# Patient Record
Sex: FEMALE | Race: BLACK OR AFRICAN AMERICAN | NOT HISPANIC OR LATINO | Employment: FULL TIME | URBAN - METROPOLITAN AREA
[De-identification: names, ages, dates, MRNs, and addresses within clinical notes are randomized per-mention and may not be internally consistent; named-entity substitution may affect disease eponyms.]

---

## 2017-10-04 ENCOUNTER — HOSPITAL ENCOUNTER (EMERGENCY)
Facility: HOSPITAL | Age: 47
Discharge: HOME | End: 2017-10-04
Payer: COMMERCIAL

## 2017-10-04 VITALS
BODY MASS INDEX: 22.22 KG/M2 | TEMPERATURE: 98 F | HEIGHT: 69 IN | RESPIRATION RATE: 18 BRPM | DIASTOLIC BLOOD PRESSURE: 89 MMHG | SYSTOLIC BLOOD PRESSURE: 135 MMHG | OXYGEN SATURATION: 100 % | HEART RATE: 72 BPM | WEIGHT: 150 LBS

## 2017-10-04 DIAGNOSIS — S60.221A CONTUSION OF RIGHT HAND, INITIAL ENCOUNTER: Primary | ICD-10-CM

## 2017-10-04 PROCEDURE — 99283 EMERGENCY DEPT VISIT LOW MDM: CPT

## 2017-10-04 PROCEDURE — 25000003 PHARM REV CODE 250: Performed by: PHYSICIAN ASSISTANT

## 2017-10-04 RX ORDER — METHOCARBAMOL 500 MG/1
1000 TABLET, FILM COATED ORAL 3 TIMES DAILY
Qty: 30 TABLET | Refills: 0 | Status: SHIPPED | OUTPATIENT
Start: 2017-10-04 | End: 2017-10-09

## 2017-10-04 RX ORDER — DICLOFENAC SODIUM 75 MG/1
75 TABLET, DELAYED RELEASE ORAL 2 TIMES DAILY
Qty: 20 TABLET | Refills: 0 | Status: SHIPPED | OUTPATIENT
Start: 2017-10-04 | End: 2022-07-18

## 2017-10-04 RX ORDER — CYCLOBENZAPRINE HCL 10 MG
10 TABLET ORAL
Status: COMPLETED | OUTPATIENT
Start: 2017-10-04 | End: 2017-10-04

## 2017-10-04 RX ADMIN — CYCLOBENZAPRINE HYDROCHLORIDE 10 MG: 10 TABLET, FILM COATED ORAL at 10:10
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
## 2021-06-08 ENCOUNTER — HOSPITAL ENCOUNTER (EMERGENCY)
Facility: HOSPITAL | Age: 51
Discharge: HOME | End: 2021-06-08
Attending: EMERGENCY MEDICINE
Payer: COMMERCIAL

## 2021-06-08 VITALS
DIASTOLIC BLOOD PRESSURE: 77 MMHG | HEART RATE: 90 BPM | HEIGHT: 69 IN | SYSTOLIC BLOOD PRESSURE: 132 MMHG | OXYGEN SATURATION: 98 % | RESPIRATION RATE: 20 BRPM | WEIGHT: 211.06 LBS | TEMPERATURE: 98 F | BODY MASS INDEX: 31.26 KG/M2

## 2021-06-08 DIAGNOSIS — S09.90XA INJURY OF HEAD, INITIAL ENCOUNTER: ICD-10-CM

## 2021-06-08 DIAGNOSIS — M54.50 ACUTE BILATERAL LOW BACK PAIN WITHOUT SCIATICA: ICD-10-CM
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
## 2021-06-08 DIAGNOSIS — M54.2 NECK PAIN: ICD-10-CM

## 2021-06-08 DIAGNOSIS — S06.0X0A CONCUSSION WITHOUT LOSS OF CONSCIOUSNESS, INITIAL ENCOUNTER: ICD-10-CM

## 2021-06-08 PROCEDURE — 99284 EMERGENCY DEPT VISIT MOD MDM: CPT | Mod: 25

## 2021-06-08 PROCEDURE — 25000003 PHARM REV CODE 250: Performed by: NURSE PRACTITIONER

## 2021-06-08 RX ORDER — ASPIRIN 81 MG/1
81 TABLET ORAL
COMMUNITY
Start: 2021-03-25 | End: 2023-08-17

## 2021-06-08 RX ORDER — TIZANIDINE 4 MG/1
4 TABLET ORAL EVERY 8 HOURS PRN
Qty: 20 TABLET | Refills: 0 | Status: SHIPPED | OUTPATIENT
Start: 2021-06-08 | End: 2021-06-18

## 2021-06-08 RX ORDER — NAPROXEN 500 MG/1
TABLET ORAL
COMMUNITY
Start: 2021-04-07 | End: 2022-07-18

## 2021-06-08 RX ORDER — DICLOFENAC SODIUM 50 MG/1
50 TABLET, DELAYED RELEASE ORAL 3 TIMES DAILY PRN
Qty: 15 TABLET | Refills: 0 | Status: SHIPPED | OUTPATIENT
Start: 2021-06-08 | End: 2022-07-18

## 2021-06-08 RX ORDER — BUTALBITAL, ACETAMINOPHEN AND CAFFEINE 50; 325; 40 MG/1; MG/1; MG/1
1 TABLET ORAL EVERY 6 HOURS PRN
Qty: 20 TABLET | Refills: 0 | Status: SHIPPED | OUTPATIENT
Start: 2021-06-08 | End: 2021-07-08

## 2021-06-08 RX ORDER — BUTALBITAL, ACETAMINOPHEN AND CAFFEINE 50; 325; 40 MG/1; MG/1; MG/1
1 TABLET ORAL
Status: COMPLETED | OUTPATIENT
Start: 2021-06-08 | End: 2021-06-08

## 2021-06-08 RX ORDER — MONTELUKAST SODIUM 10 MG/1
10 TABLET ORAL DAILY
Status: ON HOLD | COMMUNITY
Start: 2021-06-02 | End: 2023-06-11 | Stop reason: HOSPADM

## 2021-06-08 RX ORDER — DEXBROMPHENIRAMINE MALEATE, PHENYLEPHRINE HYDROCHLORIDE 2; 7.5 MG/1; MG/1
TABLET ORAL
COMMUNITY
Start: 2021-06-02 | End: 2022-07-27

## 2021-06-08 RX ORDER — PREDNISONE 20 MG/1
60 TABLET ORAL DAILY
COMMUNITY
Start: 2021-06-02 | End: 2022-07-18

## 2021-06-08 RX ORDER — ONDANSETRON 4 MG/1
4 TABLET, ORALLY DISINTEGRATING ORAL EVERY 8 HOURS PRN
Qty: 15 TABLET | Refills: 0 | Status: SHIPPED | OUTPATIENT
Start: 2021-06-08 | End: 2022-08-09

## 2021-06-08 RX ORDER — ONDANSETRON 8 MG/1
8 TABLET, ORALLY DISINTEGRATING ORAL
Status: COMPLETED | OUTPATIENT
Start: 2021-06-08 | End: 2021-06-08

## 2021-06-08 RX ADMIN — BUTALBITAL, ACETAMINOPHEN, AND CAFFEINE 1 TABLET: 50; 325; 40 TABLET ORAL at 08:06

## 2021-06-08 RX ADMIN — ONDANSETRON 8 MG: 8 TABLET, ORALLY DISINTEGRATING ORAL at 08:06

## 2021-06-15 ENCOUNTER — TELEPHONE (OUTPATIENT)
Dept: NEUROLOGY | Facility: CLINIC | Age: 51
End: 2021-06-15

## 2021-07-26 ENCOUNTER — OFFICE VISIT (OUTPATIENT)
Dept: NEUROLOGY | Facility: CLINIC | Age: 51
End: 2021-07-26
Payer: MEDICAID

## 2021-07-26 VITALS
WEIGHT: 212.94 LBS | SYSTOLIC BLOOD PRESSURE: 120 MMHG | HEIGHT: 69 IN | HEART RATE: 79 BPM | DIASTOLIC BLOOD PRESSURE: 80 MMHG | BODY MASS INDEX: 31.54 KG/M2

## 2021-07-26 DIAGNOSIS — S06.0X0S CONCUSSION WITHOUT LOSS OF CONSCIOUSNESS, SEQUELA: Primary | ICD-10-CM

## 2021-07-26 PROCEDURE — 99999 PR PBB SHADOW E&M-EST. PATIENT-LVL IV: ICD-10-PCS | Mod: PBBFAC,,, | Performed by: PSYCHIATRY & NEUROLOGY

## 2021-07-26 PROCEDURE — 99999 PR PBB SHADOW E&M-EST. PATIENT-LVL IV: CPT | Mod: PBBFAC,,, | Performed by: PSYCHIATRY & NEUROLOGY

## 2021-07-26 PROCEDURE — 99214 OFFICE O/P EST MOD 30 MIN: CPT | Mod: PBBFAC | Performed by: PSYCHIATRY & NEUROLOGY

## 2021-07-26 PROCEDURE — 99205 PR OFFICE/OUTPT VISIT, NEW, LEVL V, 60-74 MIN: ICD-10-PCS | Mod: S$PBB,,, | Performed by: PSYCHIATRY & NEUROLOGY

## 2021-07-26 PROCEDURE — 99205 OFFICE O/P NEW HI 60 MIN: CPT | Mod: S$PBB,,, | Performed by: PSYCHIATRY & NEUROLOGY

## 2021-07-26 RX ORDER — TIZANIDINE HYDROCHLORIDE 4 MG/1
4 CAPSULE, GELATIN COATED ORAL 3 TIMES DAILY PRN
COMMUNITY
Start: 2021-06-17 | End: 2022-06-17

## 2021-07-26 RX ORDER — GABAPENTIN 300 MG/1
CAPSULE ORAL
Qty: 270 CAPSULE | Refills: 1 | Status: SHIPPED | OUTPATIENT
Start: 2021-07-26 | End: 2022-01-24 | Stop reason: SDUPTHER

## 2021-07-26 RX ORDER — MECLIZINE HYDROCHLORIDE 25 MG/1
TABLET ORAL
Status: ON HOLD | COMMUNITY
Start: 2021-06-09 | End: 2023-06-11 | Stop reason: HOSPADM

## 2021-08-02 ENCOUNTER — CLINICAL SUPPORT (OUTPATIENT)
Dept: REHABILITATION | Facility: HOSPITAL | Age: 51
End: 2021-08-02
Payer: MEDICAID

## 2021-08-02 DIAGNOSIS — S06.0X0S CONCUSSION WITHOUT LOSS OF CONSCIOUSNESS, SEQUELA: ICD-10-CM

## 2021-08-02 PROCEDURE — 97162 PT EVAL MOD COMPLEX 30 MIN: CPT

## 2021-08-02 PROCEDURE — 97112 NEUROMUSCULAR REEDUCATION: CPT

## 2021-08-12 ENCOUNTER — CLINICAL SUPPORT (OUTPATIENT)
Dept: REHABILITATION | Facility: HOSPITAL | Age: 51
End: 2021-08-12
Payer: MEDICAID

## 2021-08-12 DIAGNOSIS — S06.0X0S CONCUSSION WITHOUT LOSS OF CONSCIOUSNESS, SEQUELA: Primary | ICD-10-CM

## 2021-08-12 PROCEDURE — 97112 NEUROMUSCULAR REEDUCATION: CPT

## 2021-08-19 ENCOUNTER — CLINICAL SUPPORT (OUTPATIENT)
Dept: REHABILITATION | Facility: HOSPITAL | Age: 51
End: 2021-08-19
Payer: MEDICAID

## 2021-08-19 DIAGNOSIS — S06.0X0S CONCUSSION WITHOUT LOSS OF CONSCIOUSNESS, SEQUELA: Primary | ICD-10-CM

## 2021-08-19 PROCEDURE — 97112 NEUROMUSCULAR REEDUCATION: CPT

## 2021-08-24 ENCOUNTER — CLINICAL SUPPORT (OUTPATIENT)
Dept: REHABILITATION | Facility: HOSPITAL | Age: 51
End: 2021-08-24
Payer: MEDICAID

## 2021-08-24 DIAGNOSIS — S06.0X0S CONCUSSION WITHOUT LOSS OF CONSCIOUSNESS, SEQUELA: Primary | ICD-10-CM

## 2021-08-24 PROCEDURE — 97112 NEUROMUSCULAR REEDUCATION: CPT

## 2021-08-26 ENCOUNTER — CLINICAL SUPPORT (OUTPATIENT)
Dept: REHABILITATION | Facility: HOSPITAL | Age: 51
End: 2021-08-26
Payer: MEDICAID

## 2021-08-26 DIAGNOSIS — S06.0X0S CONCUSSION WITHOUT LOSS OF CONSCIOUSNESS, SEQUELA: Primary | ICD-10-CM

## 2021-08-26 PROCEDURE — 97112 NEUROMUSCULAR REEDUCATION: CPT

## 2021-09-03 ENCOUNTER — PATIENT MESSAGE (OUTPATIENT)
Dept: NEUROLOGY | Facility: CLINIC | Age: 51
End: 2021-09-03

## 2021-09-07 ENCOUNTER — TELEPHONE (OUTPATIENT)
Dept: NEUROLOGY | Facility: CLINIC | Age: 51
End: 2021-09-07
Payer: MEDICAID

## 2021-09-14 ENCOUNTER — CLINICAL SUPPORT (OUTPATIENT)
Dept: REHABILITATION | Facility: HOSPITAL | Age: 51
End: 2021-09-14
Payer: MEDICAID

## 2021-09-14 DIAGNOSIS — S06.0X0S CONCUSSION WITHOUT LOSS OF CONSCIOUSNESS, SEQUELA: Primary | ICD-10-CM

## 2021-09-14 PROCEDURE — 97140 MANUAL THERAPY 1/> REGIONS: CPT

## 2021-09-14 PROCEDURE — 97112 NEUROMUSCULAR REEDUCATION: CPT

## 2021-09-21 ENCOUNTER — CLINICAL SUPPORT (OUTPATIENT)
Dept: REHABILITATION | Facility: HOSPITAL | Age: 51
End: 2021-09-21
Payer: MEDICAID

## 2021-09-21 DIAGNOSIS — S06.0X0S CONCUSSION WITHOUT LOSS OF CONSCIOUSNESS, SEQUELA: Primary | ICD-10-CM

## 2021-09-21 PROCEDURE — 97112 NEUROMUSCULAR REEDUCATION: CPT

## 2021-11-10 ENCOUNTER — TELEPHONE (OUTPATIENT)
Dept: NEUROLOGY | Facility: CLINIC | Age: 51
End: 2021-11-10
Payer: MEDICAID

## 2021-11-16 ENCOUNTER — TELEPHONE (OUTPATIENT)
Dept: NEUROLOGY | Facility: CLINIC | Age: 51
End: 2021-11-16
Payer: MEDICAID

## 2022-01-24 DIAGNOSIS — S06.0X0S CONCUSSION WITHOUT LOSS OF CONSCIOUSNESS, SEQUELA: ICD-10-CM

## 2022-01-24 RX ORDER — GABAPENTIN 300 MG/1
CAPSULE ORAL
Qty: 270 CAPSULE | Refills: 1 | Status: SHIPPED | OUTPATIENT
Start: 2022-01-24 | End: 2022-07-18 | Stop reason: SDUPTHER

## 2022-03-07 ENCOUNTER — TELEPHONE (OUTPATIENT)
Dept: NEUROLOGY | Facility: CLINIC | Age: 52
End: 2022-03-07
Payer: COMMERCIAL

## 2022-03-07 NOTE — TELEPHONE ENCOUNTER
----- Message from Nohemi Lilly sent at 3/7/2022  7:05 AM CST -----  Name Of Caller: Kaylan     Provider Name: Prabhu Wilkins    Does patient feel the need to be seen today? No     Relationship to the Pt?: pt     Contact Preference?: [phone]    What is the nature of the call?:Pt called and would like a call back for appt with you she said any day at 12:15pm  appt I tried to schedule for appt no opening came for me for virtual

## 2022-03-07 NOTE — TELEPHONE ENCOUNTER
----- Message from Oliverio Garcia sent at 3/7/2022  4:45 PM CST -----  Contact: Patient  Patient missed call. Requesting call back      Patient @[phone] (Grant)

## 2022-03-08 ENCOUNTER — PATIENT MESSAGE (OUTPATIENT)
Dept: NEUROLOGY | Facility: CLINIC | Age: 52
End: 2022-03-08
Payer: COMMERCIAL

## 2022-03-09 ENCOUNTER — OFFICE VISIT (OUTPATIENT)
Dept: NEUROLOGY | Facility: CLINIC | Age: 52
End: 2022-03-09
Payer: MEDICAID

## 2022-03-09 DIAGNOSIS — M54.40 CHRONIC MIDLINE LOW BACK PAIN WITH SCIATICA, SCIATICA LATERALITY UNSPECIFIED: ICD-10-CM

## 2022-03-09 DIAGNOSIS — S06.0X0D CONCUSSION WITHOUT LOSS OF CONSCIOUSNESS, SUBSEQUENT ENCOUNTER: ICD-10-CM

## 2022-03-09 DIAGNOSIS — G89.29 CHRONIC MIDLINE LOW BACK PAIN WITH SCIATICA, SCIATICA LATERALITY UNSPECIFIED: ICD-10-CM

## 2022-03-09 PROBLEM — S06.0X0A CONCUSSION WITHOUT LOSS OF CONSCIOUSNESS: Status: ACTIVE | Noted: 2022-03-09

## 2022-03-09 PROCEDURE — 1159F PR MEDICATION LIST DOCUMENTED IN MEDICAL RECORD: ICD-10-PCS | Mod: CPTII,95,, | Performed by: PSYCHIATRY & NEUROLOGY

## 2022-03-09 PROCEDURE — 99214 PR OFFICE/OUTPT VISIT, EST, LEVL IV, 30-39 MIN: ICD-10-PCS | Mod: 95,,, | Performed by: PSYCHIATRY & NEUROLOGY

## 2022-03-09 PROCEDURE — 1160F RVW MEDS BY RX/DR IN RCRD: CPT | Mod: CPTII,95,, | Performed by: PSYCHIATRY & NEUROLOGY

## 2022-03-09 PROCEDURE — 99214 OFFICE O/P EST MOD 30 MIN: CPT | Mod: 95,,, | Performed by: PSYCHIATRY & NEUROLOGY

## 2022-03-09 PROCEDURE — 1159F MED LIST DOCD IN RCRD: CPT | Mod: CPTII,95,, | Performed by: PSYCHIATRY & NEUROLOGY

## 2022-03-09 PROCEDURE — 1160F PR REVIEW ALL MEDS BY PRESCRIBER/CLIN PHARMACIST DOCUMENTED: ICD-10-PCS | Mod: CPTII,95,, | Performed by: PSYCHIATRY & NEUROLOGY

## 2022-03-09 RX ORDER — AMITRIPTYLINE HYDROCHLORIDE 25 MG/1
25 TABLET, FILM COATED ORAL NIGHTLY
Qty: 90 TABLET | Refills: 3 | Status: ON HOLD | OUTPATIENT
Start: 2022-03-09 | End: 2023-06-11 | Stop reason: HOSPADM
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
## 2022-03-25 ENCOUNTER — HOSPITAL ENCOUNTER (OUTPATIENT)
Dept: RADIOLOGY | Facility: HOSPITAL | Age: 52
Discharge: HOME | End: 2022-03-25
Attending: PSYCHIATRY & NEUROLOGY
Payer: COMMERCIAL

## 2022-03-25 DIAGNOSIS — G89.29 CHRONIC MIDLINE LOW BACK PAIN WITH SCIATICA, SCIATICA LATERALITY UNSPECIFIED: ICD-10-CM

## 2022-03-25 DIAGNOSIS — M54.40 CHRONIC MIDLINE LOW BACK PAIN WITH SCIATICA, SCIATICA LATERALITY UNSPECIFIED: ICD-10-CM

## 2022-03-25 DIAGNOSIS — S06.0X0D CONCUSSION WITHOUT LOSS OF CONSCIOUSNESS, SUBSEQUENT ENCOUNTER: ICD-10-CM

## 2022-03-25 PROCEDURE — 72148 MRI LUMBAR SPINE W/O DYE: CPT | Mod: TC

## 2022-03-25 PROCEDURE — 72148 MRI LUMBAR SPINE W/O DYE: CPT | Mod: 26,,, | Performed by: RADIOLOGY

## 2022-03-25 PROCEDURE — 72148 MRI LUMBAR SPINE WITHOUT CONTRAST: ICD-10-PCS | Mod: 26,,, | Performed by: RADIOLOGY

## 2022-04-01 ENCOUNTER — PATIENT MESSAGE (OUTPATIENT)
Dept: NEUROLOGY | Facility: CLINIC | Age: 52
End: 2022-04-01
Payer: COMMERCIAL

## 2022-07-13 ENCOUNTER — HOSPITAL ENCOUNTER (EMERGENCY)
Facility: HOSPITAL | Age: 52
Discharge: HOME | End: 2022-07-13
Attending: EMERGENCY MEDICINE
Payer: COMMERCIAL

## 2022-07-13 VITALS
OXYGEN SATURATION: 98 % | HEIGHT: 69 IN | SYSTOLIC BLOOD PRESSURE: 130 MMHG | RESPIRATION RATE: 16 BRPM | TEMPERATURE: 98 F | DIASTOLIC BLOOD PRESSURE: 64 MMHG | BODY MASS INDEX: 31.45 KG/M2 | HEART RATE: 60 BPM

## 2022-07-13 DIAGNOSIS — M54.9 ACUTE BILATERAL BACK PAIN, UNSPECIFIED BACK LOCATION: ICD-10-CM

## 2022-07-13 DIAGNOSIS — G44.319 ACUTE POST-TRAUMATIC HEADACHE, NOT INTRACTABLE: Primary | ICD-10-CM
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
## 2022-07-13 PROCEDURE — 96372 THER/PROPH/DIAG INJ SC/IM: CPT | Performed by: EMERGENCY MEDICINE

## 2022-07-13 PROCEDURE — 25000003 PHARM REV CODE 250: Performed by: EMERGENCY MEDICINE

## 2022-07-13 PROCEDURE — 99285 EMERGENCY DEPT VISIT HI MDM: CPT | Mod: 25

## 2022-07-13 PROCEDURE — 63600175 PHARM REV CODE 636 W HCPCS: Performed by: EMERGENCY MEDICINE

## 2022-07-13 RX ORDER — CYCLOBENZAPRINE HCL 10 MG
5 TABLET ORAL 3 TIMES DAILY PRN
Qty: 15 TABLET | Refills: 0 | Status: SHIPPED | OUTPATIENT
Start: 2022-07-13 | End: 2022-07-18

## 2022-07-13 RX ORDER — MORPHINE SULFATE 4 MG/ML
4 INJECTION, SOLUTION INTRAMUSCULAR; INTRAVENOUS
Status: COMPLETED | OUTPATIENT
Start: 2022-07-13 | End: 2022-07-13

## 2022-07-13 RX ORDER — HYDROCODONE BITARTRATE AND ACETAMINOPHEN 5; 325 MG/1; MG/1
1 TABLET ORAL EVERY 6 HOURS PRN
Qty: 9 TABLET | Refills: 0 | Status: SHIPPED | OUTPATIENT
Start: 2022-07-13 | End: 2022-07-27

## 2022-07-13 RX ORDER — ONDANSETRON 4 MG/1
4 TABLET, ORALLY DISINTEGRATING ORAL
Status: COMPLETED | OUTPATIENT
Start: 2022-07-13 | End: 2022-07-13

## 2022-07-13 RX ADMIN — MORPHINE SULFATE 4 MG: 4 INJECTION INTRAVENOUS at 12:07

## 2022-07-13 RX ADMIN — ONDANSETRON 4 MG: 4 TABLET, ORALLY DISINTEGRATING ORAL at 12:07

## 2022-07-13 NOTE — DISCHARGE INSTRUCTIONS
Ibuprofen for pain, Flexeril as a muscle relaxer Norco for breakthrough pain.  Take it easy the next few days.  Return as needed for any worsening symptoms, problems, questions or concerns.

## 2022-07-13 NOTE — Clinical Note
"Kaylan"Shannon Lal was seen and treated in our emergency department on 7/13/2022.  She may return to work on 07/16/2022.       If you have any questions or concerns, please don't hesitate to call.      Farhat Calderon Jr., MD"

## 2022-07-13 NOTE — ED PROVIDER NOTES
SCRIBE #1 NOTE: I, Brittaney Aguilar, am scribing for, and in the presence of, Farhat Calderon Jr., MD. I have scribed the entire note.      History      Chief Complaint   Patient presents with    Pain     Head and back pain d/t fall from chair breaking this am; c-collar in place       Review of patient's allergies indicates:   Allergen Reactions    Codeine         HPI   HPI    7/13/2022, 11:28 AM   History obtained from the patient      History of Present Illness: Kaylan Lal is a 52 y.o. female patient who presents to the Emergency Department for an evaluation after she fell PTA. The pt reports that she went to sit in a chair and it collapsed causing her to fall onto her buttocks and hit the back of her head and back. Pt denies LOC or taking blood thinners. Pt c/o a headache, back pain, and neck pain. Symptoms are constant and moderate in severity. No mitigating or exacerbating factors reported. Patient denies any syncope, LOC, fever, chills, SOB, CP, weakness, numbness, and all other sxs at this time. No prior Tx reported. Pt denies the possibility of being pregnant as she has had a partial hysterectomy. No further complaints or concerns at this time.  Patient notes significant headache after head trauma.        Arrival mode: EMS    PCP: Jyothi Edwards MD       Past Medical History:  No past medical history on file.    Past Surgical History:  Past Surgical History:   Procedure Laterality Date    HYSTERECTOMY      Partial         Family History:  No family history on file.    Social History:  Social History     Tobacco Use    Smoking status: Never Smoker    Smokeless tobacco: Never Used   Substance and Sexual Activity    Alcohol use: Not Currently    Drug use: Never    Sexual activity: Yes       ROS   Review of Systems   Constitutional: Negative for chills and fever.   HENT: Negative for sore throat.    Respiratory: Negative for shortness of breath.    Cardiovascular: Negative for chest pain.  "  Gastrointestinal: Negative for nausea.   Genitourinary: Negative for dysuria.   Musculoskeletal: Positive for back pain and neck pain.   Skin: Negative for rash.   Neurological: Positive for headaches. Negative for syncope, weakness and numbness.        (-) LOC   Hematological: Does not bruise/bleed easily.   All other systems reviewed and are negative.    Physical Exam      Initial Vitals [07/13/22 1104]   BP Pulse Resp Temp SpO2   (!) 159/88 69 18 97.9 °F (36.6 °C) 98 %      MAP       --          Physical Exam  GEN:  Alert and oriented to person place and time.  No acute distress.  HEENT:  Normocephalic atraumatic. Extraocular muscles intact bilaterally. No evidence of entrapment.  No nasal deformity.  Nasal septum is midline.  There is no septal hematoma.  Tympanic membranes are normal. No hemotympanum.  Negative Gates sign. No CSF leak  CV:.  Regular rate and rhythm without gallops murmurs or rubs. 2+ pulses bilateral upper and lower extremities.  PULM:  Clear to auscultation bilaterally. No respiratory distress    Gi:  Soft nontender nondistended with normoactive bowel sounds  :.  No CVA tenderness. No suprapubic tenderness.  MS:  There is no point C or S tenderness.  There is some midline tenderness to the lower thoracic/upper lumbar region.  No step-off or crepitus.  Normal curvature.  Patient does note some mild pain with rightward axial rotation of the neck.  C-collar has been maintained.  All other bones been palpated joints range fully without tenderness or deformity.    NEURO:  II-XII intact bilaterally. No focal lateralizing signs.  GCS of 15.   SKIN:  Intact. No rash or laceration.      ED Course    Procedures  ED Vital Signs:  Vitals:    07/13/22 1104 07/13/22 1205 07/13/22 1235 07/13/22 1315   BP: (!) 159/88 (!) 161/81 134/69 130/64   Pulse: 69 61 (!) 58 60   Resp: 18 18 16 16   Temp: 97.9 °F (36.6 °C)   98 °F (36.7 °C)   TempSrc: Oral   Oral   SpO2: 98% 98% 98% 98%   Height: 5' 9" (1.753 m)    "       Abnormal Lab Results:  Labs Reviewed - No data to display       Imaging Results:  Imaging Results          X-Ray Thoracic Spine AP And Lateral (Final result)  Result time 07/13/22 12:51:13    Final result by Kian Gamez MD (07/13/22 12:51:13)                 Impression:      1.  Negative for acute process of the thoracic spine.  Mild degenerative changes.    2.  Left retrocardiac scarring or atelectasis.  Early infectious process difficult to exclude in the right clinical setting.  Clinical correlation is advised.    3.  Incidental findings as noted above.      Electronically signed by: Kian Gamez MD  Date:    07/13/2022  Time:    12:51             Narrative:    EXAMINATION:  XR THORACIC SPINE AP LATERAL    CLINICAL HISTORY:  Unspecified fall, initial encounter    TECHNIQUE:  AP and lateral views of the thoracic spine were performed.    COMPARISON:  None    FINDINGS:  Intervertebral disc heights and vertebral body heights are well maintained.  Mild multilevel marginal spondylosis.  Negative for fracture or subluxation.    Tortuous aorta.  Cardiac silhouette size is normal.  Left retrocardiac scarring or atelectasis.  Lungs are otherwise clear.                               X-Ray Lumbar Spine Ap And Lateral (Final result)  Result time 07/13/22 12:44:05    Final result by Kian Gamez MD (07/13/22 12:44:05)                 Impression:      1.  Negative for acute process involving the lumbar spine.    2.  Minor marginal spondylosis.      Electronically signed by: Kian Gamez MD  Date:    07/13/2022  Time:    12:44             Narrative:    EXAMINATION:  XR LUMBAR SPINE AP AND LATERAL    CLINICAL HISTORY:  fall;    COMPARISON:  June 8, 2021    FINDINGS:  There are 5 weight bearing lumbar vertebra.  Minor multilevel marginal spondylosis.  The vertebral body heights and intervertebral disc heights are   well-maintained. Negative for spondylolysis or spondylolisthesis. The sacral ala and sacroiliac joints  are intact. The bowel gas pattern is normal.                               CT Cervical Spine Without Contrast (Final result)  Result time 07/13/22 12:13:18    Final result by Kian Gamez MD (07/13/22 12:13:18)                 Impression:      1.  Negative CT scan of the cervical spine. No evidence of fracture or subluxation.    2.  Similar degree of mild degenerative changes involving the cervical spine mainly manifesting is anterior annular calcifications and anterior marginal spondylosis.    3.  8 mm low-attenuation focus in the left thyroid lobe noted.    All CT scans at this facility are performed  using dose modulation techniques as appropriate to performed exam including the following:  automated exposure control; adjustment of mA and/or kV according to the patients size (this includes techniques or standardized protocols for targeted exams where dose is matched to indication/reason for exam: i.e. extremities or head);  iterative reconstruction technique.      Electronically signed by: Kian Gamez MD  Date:    07/13/2022  Time:    12:13             Narrative:    EXAMINATION:  CT CERVICAL SPINE WITHOUT CONTRAST    CLINICAL HISTORY:  Neck trauma, dangerous injury mechanism (Age 16-64y);Neck trauma, impaired ROM (Age 16-64y); ground level fall    TECHNIQUE:  Axial noncontrast CT scan of the cervical spine with sagittal and coronal reconstructions.    COMPARISON:  June 8, 2021    FINDINGS:  The cervical vertebrae reveal normal alignment, shape and bone density. The cervical vertebra are intact without evidence of fracture or dislocation.    Multilevel anterior annular calcifications and anterior marginal spondylosis noted.  The disc heights are well maintained.  The thecal sac is patent.  Nerve root foramen are patent.    The surrounding neck soft tissues are normal.  The lung apices are clear.    8 mm low-attenuation focus in the right thyroid gland.  The thyroid gland is otherwise normal.                                CT Head Without Contrast (Final result)  Result time 07/13/22 12:00:40    Final result by Kian Gamez MD (07/13/22 12:00:40)                 Impression:      1.  Negative for acute intracranial process. Negative for hemorrhage, or skull fracture.    2.  Mild paranasal sinus disease in distribution described above.    All CT scans at this facility are performed  using dose modulation techniques as appropriate to performed exam including the following:  automated exposure control; adjustment of mA and/or kV according to the patients size (this includes techniques or standardized protocols for targeted exams where dose is matched to indication/reason for exam: i.e. extremities or head);  iterative reconstruction technique.      Electronically signed by: Kian Gamez MD  Date:    07/13/2022  Time:    12:00             Narrative:    EXAMINATION:  CT HEAD WITHOUT CONTRAST    CLINICAL HISTORY:  Head trauma, moderate-severe; ground level fall    TECHNIQUE:  Axial images through the brain and posterior fossa were obtained without the use of IV contrast.  Sagittal and coronal reconstructions are provided for review.    COMPARISON:  June 8, 2021    FINDINGS:  The ventricles are midline and the CSF spaces are normal. The gray-white matter junction is well preserved. Negative for intracranial vascular abnormalities. Negative for mass, mass effect, cerebral edema, hemorrhage or abnormal fluid collections.  Stable falx and arachnoid granulation calcifications.    The skull and scalp are  intact.  Mild right maxillary sinus and ethmoid air cell disease.  The rest of the paranasal sinuses, mastoid air cells, middle ears and ear canals are clear. The globes are intact.                                        The Emergency Provider reviewed the vital signs and test results, which are outlined above.    ED Discussion     12:55 PM: Reassessed pt at this time. Discussed with pt all pertinent ED information and results.  Discussed pt dx and plan of tx. Gave pt all f/u and return to the ED instructions. All questions and concerns were addressed at this time. Pt expresses understanding of information and instructions, and is comfortable with plan to discharge. Pt is stable for discharge.    I discussed with patient and/or family/caretaker that evaluation in the ED does not suggest any emergent or life threatening medical conditions requiring immediate intervention beyond what was provided in the ED, and I believe patient is safe for discharge.  Regardless, an unremarkable evaluation in the ED does not preclude the development or presence of a serious of life threatening condition. As such, patient was instructed to return immediately for any worsening or change in current symptoms.           ED Medication(s):  Medications   morphine injection 4 mg (4 mg Intramuscular Given 7/13/22 1205)   ondansetron disintegrating tablet 4 mg (4 mg Oral Given 7/13/22 1205)        Follow-up Information     Jyothi Edwards MD.    Specialty: Internal Medicine  Contact information:  [address] [place] [id number] [phone]                         Discharge Medication List as of 7/13/2022 12:58 PM      START taking these medications    Details   cyclobenzaprine (FLEXERIL) 10 MG tablet Take 0.5 tablets (5 mg total) by mouth 3 (three) times daily as needed for Muscle spasms., Starting Wed 7/13/2022, Print      HYDROcodone-acetaminophen (NORCO) 5-325 mg per tablet Take 1 tablet by mouth every 6 (six) hours as needed for Pain., Starting Wed 7/13/2022, Print               Medical Decision Making    Medical Decision Making:   Clinical Tests:   Radiological Study: Ordered and Reviewed           Scribe Attestation:   Scribe #1: I performed the above scribed service and the documentation accurately describes the services I performed. I attest to the accuracy of the note.    Attending:   Physician Attestation Statement for Scribe #1: Farhat VENTURA  Kvng Pizarro MD, personally performed the services described in this documentation, as scribed by Brittaney Aguilar, in my presence, and it is both accurate and complete.          Clinical Impression       ICD-10-CM ICD-9-CM   1. Acute post-traumatic headache, not intractable  G44.319 339.21   2. Fall  W19.XXXA E888.9   3. Acute bilateral back pain, unspecified back location  M54.9 724.5       Disposition:   Disposition: Discharged  Condition: Stable         Farhat Calderon Jr., MD  07/13/22 3018

## 2022-07-27 ENCOUNTER — TELEPHONE (OUTPATIENT)
Dept: PAIN MEDICINE | Facility: CLINIC | Age: 52
End: 2022-07-27
Payer: COMMERCIAL

## 2022-08-16 ENCOUNTER — TELEPHONE (OUTPATIENT)
Dept: PAIN MEDICINE | Facility: CLINIC | Age: 52
End: 2022-08-16
Payer: COMMERCIAL

## 2022-08-16 NOTE — PROGRESS NOTES
New Patient Chronic Pain Note (Initial Visit)    Referring Physician: Jyothi Edwards MD    PCP: Jyothi Edwards MD    Chief Complaint:   Chief Complaint   Patient presents with    Back Pain        SUBJECTIVE:    Kaylan Lal is a 52 y.o. female with past medical history significant for  who presents to the clinic for the evaluation of diffuse myalgias and lower back pain.  Patient reports pain flare began July 13th when she fell backwards out of a chair with concussive injury.  Patient denies loss of consciousness.  Today patient reports pain which is constant which is rated a 5/10.  Pain is described as aching, sharp, knotting up  in patient reports pain along cervical paraspinous musculature, medial risk, medial knees, lower back.  Pain is exacerbated with prolonged sitting as well as with ambulation.  Patient reports she is able to ambulate approximately 10-15 minutes before requiring rest.  Pain is improved with laying in the left lateral decubitus position and with standing with assistive device, cane.  Patient has been performing physical therapy but finds it difficult to attend sessions internally secondary to work schedule.  Patient denies significant upper or lower extremity weakness, bowel or bladder incontinence or saddle anesthesia.     Patient denies night fever/night sweats, urinary incontinence, bowel incontinence, significant weight loss, significant motor weakness and loss of sensations.    Pain Disability Index Review:     Last 3 PDI Scores 8/17/2022   Pain Disability Index (PDI) 53       Non-Pharmacologic Treatments:  Physical Therapy/Home Exercise: yes  Ice/Heat:yes  TENS: no  Acupuncture: no  Massage: no  Chiropractic: no    Other: no      Pain Medications:  - Adjuvant Medications: Alleve (Naproxen), Elavil (Amitriptyline) and Neurontin (Gabapentin)  - Anti-Coagulants: Aspirin    Pain Procedures:   None    History reviewed. No pertinent past medical history.  Past Surgical History:    Procedure Laterality Date    HYSTERECTOMY      Partial     Review of patient's allergies indicates:   Allergen Reactions    Codeine      Other reaction(s): Unknown    Hydrocodone-acetaminophen     Penicillins        Current Outpatient Medications   Medication Sig    meclizine (ANTIVERT) 25 mg tablet One tab po q 6hours prn dizziness    amitriptyline (ELAVIL) 25 MG tablet Take 1 tablet (25 mg total) by mouth every evening. (Patient not taking: No sig reported)    aspirin (ECOTRIN) 81 MG EC tablet Take 81 mg by mouth.    azelastine (ASTELIN) 137 mcg (0.1 %) nasal spray SMARTSIG:Both Nares    cyclobenzaprine (FLEXERIL) 5 MG tablet Take 5 mg by mouth 3 (three) times daily as needed.    dexbrompheniramine-phenyleph (ALAHIST PE) 2-7.5 mg Tab 1 tablet    gabapentin (NEURONTIN) 300 MG capsule Take up to 3 tabs daily as needed for back, neck, and headache pain (Patient not taking: No sig reported)    LIDOcaine (LIDODERM) 5 %     montelukast (SINGULAIR) 10 mg tablet Take 10 mg by mouth once daily.    naproxen (NAPROSYN) 500 MG tablet Take 1 tablet (500 mg total) by mouth 2 (two) times daily with meals. (Patient not taking: No sig reported)    pregabalin (LYRICA) 75 MG capsule Take 1 capsule, 75 mg, once daily for 1 week.  Followed by take 1 capsule 75 mg twice daily for 1 week.  Followed by 2 capsules, 150 mg in the morning and 75 mg at night for 1 week.  Followed by 150 mg twice daily for 1 week.  Followed by 225 mg in the morning and 150 mg in the evening for 1 week.  Followed by 225 mg b.i.d..     No current facility-administered medications for this visit.       Review of Systems     GENERAL:  No weight loss, malaise or fevers.  HEENT:   No recent changes in vision or hearing  NECK:  Negative for lumps, no difficulty with swallowing.  RESPIRATORY:  Negative for cough, wheezing or shortness of breath, patient denies any recent URI.  CARDIOVASCULAR:  Negative for chest pain or palpitations.  GI:  Negative  "for abdominal discomfort, blood in stools or black stools or change in bowel habits.  MUSCULOSKELETAL:  See HPI.  SKIN:  Negative for lesions, rash, and itching.  PSYCH:  No mood disorder or recent psychosocial stressors.   HEMATOLOGY/LYMPHOLOGY:  Negative for prolonged bleeding, bruising easily or swollen nodes.    NEURO:   No history of syncope, paralysis, seizures or tremors.  All other reviewed and negative other than HPI.    OBJECTIVE:    BP (!) 134/97   Pulse 72   Ht 5' 9" (1.753 m)   Wt 95.7 kg (210 lb 15.7 oz)   BMI 31.16 kg/m²       Physical Exam    GENERAL: Well appearing, in no acute distress, alert and oriented x3.  PSYCH:  Mood and affect appropriate.  SKIN: Skin color, texture, turgor normal, no rashes or lesions.  HEAD/FACE:  Normocephalic, atraumatic. Cranial nerves grossly intact.    NECK:  pain to palpation over the cervical paraspinous muscles. Spurling Negative. pain with neck flexion, extension, or lateral flexion.   Normal  strength bilaterally    CV: RRR with palpation of the radial artery.  PULM: No evidence of respiratory difficulty, symmetric chest rise.  GI:  Soft and non-tender.    BACK: Straight leg raising in the sitting and supine positions is negative to radicular pain.  pain to palpation over the facet joints of the lumbar spine or spinous processes. Normal range of motion without pain reproduction.  EXTREMITIES: Peripheral joint ROM is full and pain free without obvious instability or laxity in all four extremities. No deformities, edema, or skin discoloration. Good capillary refill.  MUSCULOSKELETAL: Able to stand on heels & toes.    hip, and knee provocative maneuvers are negative. pain with palpation over the sacroiliac joints bilaterally.  Gaenslen's, Distraction/Compression and  FABERs test is positive.  Facet loading test is positive bilaterally.   Bilateral upper and lower extremity strength is normal and symmetric.  No atrophy or tone abnormalities are " noted.  Tenderness to palpation at 18/18 tender points.  RIGHT Lower extremity: Hip flexion 5/5, Hip Abduction 5/5, Hip Adduction 5/5, Knee extension 5/5, Knee flexion 5/5, Ankle dorsiflexion5/5, Extensor hallucis longus 5/5, Ankle plantarflexion 5/5  LEFT Lower extremity:  Hip flexion 5/5, Hip Abduction 5/5,Hip Adduction 5/5, Knee extension 5/5, Knee flexion 5/5, Ankle dorsiflexion 5/5, Extensor hallucis longus 5/5, Ankle plantarflexion 5/5  -Normal testing knee (patellar) jerk and ankle (achilles) jerk    NEURO: Bilateral upper and lower extremity coordination and muscle stretch reflexes are physiologic and symmetric. No loss of sensation is noted.  GAIT: normal.    Imagin22    MRI Lumbar Spine Without Contrast    FINDINGS:  Alignment: Normal.    Vertebrae: 5 lumbar-type vertebral bodies. No aggressive marrow replacement process or fracture.  Likely hemangioma at the level of the LEFT L5 pedicle.    Discs: Satisfactory height and signal.    Cord: Normal. Conus terminates at .    Degenerative findings:    T12-L1: There is no focal disc herniation. No significant central canal narrowing . No significant neural foraminal narrowing.    L1-L2: There is no focal disc herniation. No significant central canal narrowing . No significant neural foraminal narrowing.    L2-L3: There is no focal disc herniation. No significant central canal narrowing . No significant neural foraminal narrowing.    L3-L4: The Broad based mild diffuse bulging of disc material .  No significant central canal narrowing . Mild bilateral neural foraminal narrowing.    L4-L5: Broad based mild diffuse bulging of disc material .  No significant central canal narrowing . Mild bilateral LEFT greater than RIGHT neural foraminal narrowing.    L5-S1: There is no focal disc herniation. No significant central canal narrowing . No significant neural foraminal narrowing.    Paraspinal muscles & soft tissues: Unremarkable.    Impression  Mild  broad-based bulging of disc material L3-L4 and L4-L5 with mild bilateral neural foraminal encroachment.    07/13/22    CT Cervical Spine Without Contrast      FINDINGS:  The cervical vertebrae reveal normal alignment, shape and bone density. The cervical vertebra are intact without evidence of fracture or dislocation.    Multilevel anterior annular calcifications and anterior marginal spondylosis noted.  The disc heights are well maintained.  The thecal sac is patent.  Nerve root foramen are patent.    The surrounding neck soft tissues are normal.  The lung apices are clear.    8 mm low-attenuation focus in the right thyroid gland.  The thyroid gland is otherwise normal.    Impression  1.  Negative CT scan of the cervical spine. No evidence of fracture or subluxation.    2.  Similar degree of mild degenerative changes involving the cervical spine mainly manifesting is anterior annular calcifications and anterior marginal spondylosis.    3.  8 mm low-attenuation focus in the left thyroid lobe noted.     07/13/22    X-Ray Lumbar Spine Ap And Lateral    FINDINGS:  There are 5 weight bearing lumbar vertebra.  Minor multilevel marginal spondylosis.  The vertebral body heights and intervertebral disc heights are   well-maintained. Negative for spondylolysis or spondylolisthesis. The sacral ala and sacroiliac joints are intact. The bowel gas pattern is normal.    06/08/21    X-Ray Cervical Spine AP And Lateral      FINDINGS:  Mild moderate multilevel degenerative joint disease of the cervical spine.    No evidence of acute fracture or dislocation.  Bony mineralization is normal.  Soft tissues are unremarkable.      ASSESSMENT: 52 y.o. year old female with mid to lower back pain, consistent with     1. Lumbar spondylosis  Ambulatory referral/consult to Physical/Occupational Therapy   2. Acute midline thoracic back pain  Ambulatory referral/consult to Pain Clinic   3. Acute midline low back pain with bilateral sciatica   Ambulatory referral/consult to Pain Clinic   4. Myofascial pain  Ambulatory referral/consult to Physical/Occupational Therapy   5. Fibromyalgia  Ambulatory referral/consult to Physical/Occupational Therapy         PLAN:   - Interventions:  We discussed considering targeted injections, including lumbar medial branch block, cervical medial branch block or trigger point injections.  We have discussed the reduced efficacy of injections when fibromyalgia symptoms are severe.  We have discussed considering these options in the future and starting with pharmacologic management and  physical therapy.  Explained the risks and benefits of the procedure in detail with the patient today in clinic along with alternative treatment options    - Anticoagulation use: yes aspirin     report:  Reviewed and consistent with medication use as prescribed.    - Medications:  -I will start the patient on a Lyrica titration to see if this helps with neuropathic pain.  We discussed increasing the dose gradually to reach a therapeutic goal according to the following algorithm.  We discussed potential side effects of this medication which may include drowsiness,dizziness, dry mouth, constipation or peripheral edema.    -Week 1: Take 1 capsule, 75 mg QD  -Week 2: Take 1 capsule 75 mg BID  -Week 3: Take 2 capsules, 150 mg in the morning and 75 mg QHS  -Week 4: Take 2 capsules, 150 mg BID  -Week 5: Take 3 capsules, 225 mg in the morning and 150 mg (2 capsules) QHS  -Week 6: Take 3 capsules, 225 mgBID      - Therapy:   -Patient demonstrates diffuse myalgias, tenderness to palpation at 18/18 tender points compounded by a constellation of symptoms including insomnia, headaches and cystitis, chronic fatigue, consistent with fibromyalgia.  I discussed with the patient the diagnosis of fibromyalgia. We advised the patient to participate in aerobic exercise for at least 6 days a week for 60 minutes per day to the point of sweating.  We counseled the  patient that this course leads to the best results for patients are suffering with fibromyalgia symptoms.  The patient was receptive to the counseling and agrees to proceed.    -We discussed initiating physical therapy to help manage the patient/s painful condition. The patient was counseled that muscle strengthening will improve the long term prognosis in regards to pain and may also help increase range of motion and mobility. They were told that one of the goals of physical therapy is that they learn how to do the exercises so that they can do them independently at home daily upon completion. The patient's questions were answered and they were agreeable to this course. A referral for external physical therapy: [facility] was provided to the patient to accommodate her work schedule.    - Imaging: Reviewed available imaging with patient and answered any questions they had regarding study    - Follow up visit: return to clinic in 4-6 weeks      The above plan and management options were discussed at length with patient. Patient is in agreement with the above and verbalized understanding.    - I discussed the goals of interventional chronic pain management with the patient on today's visit. We discussed a multimodal and systematic approach to pain.  This includes diagnostic and therapeutic injections, adjuvant pharmacologic treatment, physical therapy, and at times psychiatry.  I emphasized the importance of regular exercise, core strengthening and stretching, diet and weight loss as a cornerstone of long-term pain management.    - This condition does not require this patient to take time off of work, and the primary goal of our Pain Management services is to improve the patient's functional capacity.  - Patient Questions: Answered all of the patient's questions regarding diagnoses, therapy, treatment and next steps        Conrad Schwartz MD  Interventional Pain Management  [facility]    Disclaimer:  This note was  prepared using voice recognition system and is likely to have sound alike errors that may have been overlooked even after proof reading.  Please call me with any questions

## 2022-08-17 ENCOUNTER — OFFICE VISIT (OUTPATIENT)
Dept: PAIN MEDICINE | Facility: CLINIC | Age: 52
End: 2022-08-17
Payer: COMMERCIAL

## 2022-08-17 VITALS
SYSTOLIC BLOOD PRESSURE: 134 MMHG | HEIGHT: 69 IN | HEART RATE: 72 BPM | DIASTOLIC BLOOD PRESSURE: 97 MMHG | BODY MASS INDEX: 31.25 KG/M2 | WEIGHT: 211 LBS

## 2022-08-17 DIAGNOSIS — M54.41 ACUTE MIDLINE LOW BACK PAIN WITH BILATERAL SCIATICA: ICD-10-CM

## 2022-08-17 DIAGNOSIS — M79.7 FIBROMYALGIA: ICD-10-CM

## 2022-08-17 DIAGNOSIS — M54.42 ACUTE MIDLINE LOW BACK PAIN WITH BILATERAL SCIATICA: ICD-10-CM

## 2022-08-17 DIAGNOSIS — M54.6 ACUTE MIDLINE THORACIC BACK PAIN: ICD-10-CM

## 2022-08-17 DIAGNOSIS — M47.816 LUMBAR SPONDYLOSIS: Primary | ICD-10-CM

## 2022-08-17 DIAGNOSIS — M79.18 MYOFASCIAL PAIN: ICD-10-CM

## 2022-08-17 PROCEDURE — 1159F MED LIST DOCD IN RCRD: CPT | Mod: CPTII,S$GLB,, | Performed by: ANESTHESIOLOGY

## 2022-08-17 PROCEDURE — 1160F RVW MEDS BY RX/DR IN RCRD: CPT | Mod: CPTII,S$GLB,, | Performed by: ANESTHESIOLOGY

## 2022-08-17 PROCEDURE — 99204 PR OFFICE/OUTPT VISIT, NEW, LEVL IV, 45-59 MIN: ICD-10-PCS | Mod: S$GLB,,, | Performed by: ANESTHESIOLOGY

## 2022-08-17 PROCEDURE — 1159F PR MEDICATION LIST DOCUMENTED IN MEDICAL RECORD: ICD-10-PCS | Mod: CPTII,S$GLB,, | Performed by: ANESTHESIOLOGY

## 2022-08-17 PROCEDURE — 3008F BODY MASS INDEX DOCD: CPT | Mod: CPTII,S$GLB,, | Performed by: ANESTHESIOLOGY

## 2022-08-17 PROCEDURE — 99999 PR PBB SHADOW E&M-EST. PATIENT-LVL V: ICD-10-PCS | Mod: PBBFAC,,, | Performed by: ANESTHESIOLOGY

## 2022-08-17 PROCEDURE — 3080F DIAST BP >= 90 MM HG: CPT | Mod: CPTII,S$GLB,, | Performed by: ANESTHESIOLOGY

## 2022-08-17 PROCEDURE — 3075F PR MOST RECENT SYSTOLIC BLOOD PRESS GE 130-139MM HG: ICD-10-PCS | Mod: CPTII,S$GLB,, | Performed by: ANESTHESIOLOGY

## 2022-08-17 PROCEDURE — 1160F PR REVIEW ALL MEDS BY PRESCRIBER/CLIN PHARMACIST DOCUMENTED: ICD-10-PCS | Mod: CPTII,S$GLB,, | Performed by: ANESTHESIOLOGY

## 2022-08-17 PROCEDURE — 3080F PR MOST RECENT DIASTOLIC BLOOD PRESSURE >= 90 MM HG: ICD-10-PCS | Mod: CPTII,S$GLB,, | Performed by: ANESTHESIOLOGY

## 2022-08-17 PROCEDURE — 3008F PR BODY MASS INDEX (BMI) DOCUMENTED: ICD-10-PCS | Mod: CPTII,S$GLB,, | Performed by: ANESTHESIOLOGY

## 2022-08-17 PROCEDURE — 99999 PR PBB SHADOW E&M-EST. PATIENT-LVL V: CPT | Mod: PBBFAC,,, | Performed by: ANESTHESIOLOGY

## 2022-08-17 PROCEDURE — 3075F SYST BP GE 130 - 139MM HG: CPT | Mod: CPTII,S$GLB,, | Performed by: ANESTHESIOLOGY

## 2022-08-17 PROCEDURE — 99204 OFFICE O/P NEW MOD 45 MIN: CPT | Mod: S$GLB,,, | Performed by: ANESTHESIOLOGY

## 2022-08-17 RX ORDER — PREGABALIN 75 MG/1
CAPSULE ORAL
Qty: 147 CAPSULE | Refills: 0 | Status: ON HOLD | OUTPATIENT
Start: 2022-08-17 | End: 2023-06-11 | Stop reason: HOSPADM

## 2022-08-17 NOTE — PATIENT INSTRUCTIONS
Exercises: Neck Isometrics  To start, sit in a chair with your feet flat on the floor. Your weight should be slightly forward so that youre balanced evenly on your buttocks. Relax your shoulders and keep your head level. Using a chair with arms may help you keep your balance.       Press your palm against your forehead. Resist with your neck muscles. Hold for 10 seconds. Relax. Repeat 5 times.  Do the exercise again, pressing on the side of your head. Repeat 5 times. Switch sides.  Do the exercise again, pressing on the back of your head. Repeat 5 times.  For your safety, check with your healthcare provider before starting an exercise program.   Date Last Reviewed: 8/16/2015  © 8388-1638 [employer]. [address] [place] [address], [place] [address]. All rights reserved. This information is not intended as a substitute for professional medical care. Always follow your healthcare professional's instructions.       Exercises to Strengthen Your Lower Back  Strong lower back and abdominal muscles work together to support your spine. The exercises below will help strengthen the lower back. It is important that you begin exercising slowly and increase levels gradually.  Always begin any exercise program with stretching. If you feel pain while doing any of these exercises, stop and talk to your doctor about a more specific exercise program that better suits your condition.   Low back stretch  The point of stretching is to make you more flexible and increase your range of motion. Stretch only as much as you are able. Stretch slowly. Do not push your stretch to the limit. If at any point you feel pain while stretching, this is your (temporary) limit.  Lie on your back with your knees bent and both feet on the ground.  Slowly raise your left knee to your chest as you flatten your lower back against the floor. Hold for 5 seconds.  Relax and repeat the exercise with your right knee.  Do 10 of these exercises for each  leg.  Repeat hugging both knees to your chest at the same time.  Building lower back strength  Start your exercise routine with 10 to 30 minutes a day, 1 to 3 times a day.  Initial exercises  Lying on your back:  Ankle pumps: Move your foot up and down, towards your head, and then away. Repeat 10 times with each foot.  Heel slides: Slowly bend your knee, drawing the heel of your foot towards you. Then slide your heel/foot from you, straightening your knee. Do not lift your foot off the floor (this is not a leg lift).  Abdominal contraction: Bend your knees and put your hands on your stomach. Tighten your stomach muscles. Hold for 5 seconds, then relax. Repeat 10 times.  Straight leg raise: Bend one leg at the knee and keep the other leg straight. Tighten your stomach muscles. Slowly lift your straight leg 6 to 12 inches off the floor and hold for up to 5 seconds. Repeat 10 times on each side.  Standing:  Wall squats: Stand with your back against the wall. Move your feet about 12 inches away from the wall. Tighten your stomach muscles, and slowly bend your knees until they are at about a 45 degree angle. Do not go down too far. Hold about 5 seconds. Then slowly return to your starting position. Repeat 10 times.  Heel raises: Stand facing the wall. Slowly raise the heels of your feet up and down, while keeping your toes on the floor. If you have trouble balancing, you can touch the wall with your hands. Repeat 10 times.  More advanced exercises  When you feel comfortable enough, try these exercises.  Kneeling lumbar extension: Begin on your hands and knees. At the same time, raise and straighten your right arm and left leg until they are parallel to the ground. Hold for 2 seconds and come back slowly to a starting position. Repeat with left arm and right leg, alternating 10 times.  Prone lumbar extension: Lie face down, arms extended overhead, palms on the floor. At the same time, raise your right arm and left leg as  high as comfortably possible. Hold for 10 seconds and slowly return to start. Repeat with left arm and right leg, alternating 10 times. Gradually build up to 20 times. (Advanced: Repeat this exercise raising both arms and both legs a few inches off the floor at the same time. Hold for 5 seconds and release.)  Pelvic tilt: Lie on the floor on your back with your knees bent at 90 degrees. Your feet should be flat on the floor. Inhale, exhale, then slowly contract your abdominal muscles bringing your navel toward your spine. Let your pelvis rock back until your lower back is flat on the floor. Hold for 10 seconds while breathing smoothly.  Abdominal crunch: Perform a pelvic tilt (above) flattening your lower back against the floor. Holding the tension in your abdominal muscles, take another breath and raise your shoulder blades off the ground (this is not a full sit-up). Keep your head in line with your body (dont bend your neck forward). Hold for 2 seconds, then slowly lower.  Date Last Reviewed: 6/1/2016 © 2000-2017 [employer]. [address] [place] [address], [place] [address]. All rights reserved. This information is not intended as a substitute for professional medical care. Always follow your healthcare professional's instructions.

## 2022-08-30 ENCOUNTER — PATIENT MESSAGE (OUTPATIENT)
Dept: PAIN MEDICINE | Facility: CLINIC | Age: 52
End: 2022-08-30
Payer: COMMERCIAL

## 2022-09-20 PROBLEM — M47.812 CERVICAL SPINE DEGENERATION: Status: ACTIVE | Noted: 2022-09-20

## 2022-10-03 ENCOUNTER — TELEPHONE (OUTPATIENT)
Dept: PAIN MEDICINE | Facility: CLINIC | Age: 52
End: 2022-10-03
Payer: COMMERCIAL

## 2022-10-03 NOTE — TELEPHONE ENCOUNTER
----- Message from Jania Rider sent at 10/3/2022 10:10 AM CDT -----  Pt would like to know if she need to take off a partial day from work or the entire day for the appt. Call back number is [phone] or [phone]. x EL

## 2022-10-03 NOTE — TELEPHONE ENCOUNTER
Called patient to inform her that she does not need to take off a partial day from work or the entire day for the appt.  Also to turn her appt into a virtual. Patent Answered and did confirmed.     Mich Maria  Medical Assistant

## 2022-10-11 ENCOUNTER — TELEPHONE (OUTPATIENT)
Dept: PAIN MEDICINE | Facility: CLINIC | Age: 52
End: 2022-10-11
Payer: COMMERCIAL

## 2022-10-11 NOTE — PROGRESS NOTES
Established Patient Chronic Pain Note     The patient location is: work  The chief complaint leading to consultation is:  Lower back and right leg pain  Visit type: Virtual visit with synchronous audio and video  Total time spent with patient: 15m  Each patient to whom he or she provides medical services by telemedicine is: (1) informed of the relationship between the physician and patient and the respective role of any other health care provider with respect to management of the patient; and (2) notified that he or she may decline to receive medical services by telemedicine and may withdraw from such care at any time.    Referring Physician: No ref. provider found    PCP: Jyothi Edwards MD    Chief Complaint:   Lower back and right leg pain       SUBJECTIVE:  Interval Hx: 10/12/22  Patient presents for 2 month follow-up.  Today she reports primarily lower back pain which radiates down the lateral and posterior aspect of the right lower extremity in L4-S1 distribution to the foot.  Patient reports improvement in prior diffuse myalgias and reports pain is primarily in the lower back and right leg.  Patient continues to report weakness in the right lower extremity with prolonged standing and with ambulation.  Patient does report not being able to fill Lyrica secondary to insurance coverage and also reports not continuing Cymbalta prescribed by primary care provider secondary to nerves feeling on fire.     HPI 08/17/22  Kaylan Lal is a 52 y.o. female with past medical history significant for  who presents to the clinic for the evaluation of diffuse myalgias and lower back pain.  Patient reports pain flare began July 13th when she fell backwards out of a chair with concussive injury.  Patient denies loss of consciousness.  Today patient reports pain which is constant which is rated a 5/10.  Pain is described as aching, sharp, knotting up  and patient reports pain along cervical paraspinous musculature,  medial risk, medial knees, lower back.  Pain is exacerbated with prolonged sitting as well as with ambulation.  Patient reports she is able to ambulate approximately 10-15 minutes before requiring rest.  Pain is improved with laying in the left lateral decubitus position and with standing with assistive device, cane.  Patient has been performing physical therapy but finds it difficult to attend sessions internally secondary to work schedule.  Patient denies significant upper or lower extremity weakness, bowel or bladder incontinence or saddle anesthesia.     Patient denies night fever/night sweats, urinary incontinence, bowel incontinence, significant weight loss, significant motor weakness and loss of sensations.    Pain Disability Index Review:     Last 3 PDI Scores 8/17/2022   Pain Disability Index (PDI) 53       Non-Pharmacologic Treatments:  Physical Therapy/Home Exercise: yes  Ice/Heat:yes  TENS: no  Acupuncture: no  Massage: no  Chiropractic: no    Other: no      Pain Medications:  - Adjuvant Medications: Alleve (Naproxen), Elavil (Amitriptyline) and Neurontin (Gabapentin)  - Anti-Coagulants: Aspirin    Pain Procedures:   None    History reviewed. No pertinent past medical history.  Past Surgical History:   Procedure Laterality Date    HYSTERECTOMY      Partial     Review of patient's allergies indicates:   Allergen Reactions    Codeine      Other reaction(s): Unknown    Hydrocodone-acetaminophen     Penicillins        Current Outpatient Medications   Medication Sig    amitriptyline (ELAVIL) 25 MG tablet Take 1 tablet (25 mg total) by mouth every evening.    aspirin (ECOTRIN) 81 MG EC tablet Take 81 mg by mouth.    azelastine (ASTELIN) 137 mcg (0.1 %) nasal spray SMARTSIG:Both Nares    cyclobenzaprine (FLEXERIL) 5 MG tablet Take 5 mg by mouth 3 (three) times daily as needed.    dexbrompheniramine-phenyleph (ALAHIST PE) 2-7.5 mg Tab 1 tablet    DULoxetine (CYMBALTA) 30 MG capsule One tab at night for 3days  then increase  to one tab po bid    LIDOcaine (LIDODERM) 5 %     meclizine (ANTIVERT) 25 mg tablet One tab po q 6hours prn dizziness    montelukast (SINGULAIR) 10 mg tablet Take 10 mg by mouth once daily.    naproxen (NAPROSYN) 500 MG tablet Take 1 tablet (500 mg total) by mouth 2 (two) times daily with meals. (Patient not taking: No sig reported)    pregabalin (LYRICA) 75 MG capsule Take 1 capsule, 75 mg, once daily for 1 week.  Followed by take 1 capsule 75 mg twice daily for 1 week.  Followed by 2 capsules, 150 mg in the morning and 75 mg at night for 1 week.  Followed by 150 mg twice daily for 1 week.  Followed by 225 mg in the morning and 150 mg in the evening for 1 week.  Followed by 225 mg b.i.d.. (Patient not taking: Reported on 9/20/2022)     No current facility-administered medications for this visit.       Review of Systems     GENERAL:  No weight loss, malaise or fevers.  HEENT:   No recent changes in vision or hearing  NECK:  Negative for lumps, no difficulty with swallowing.  RESPIRATORY:  Negative for cough, wheezing or shortness of breath, patient denies any recent URI.  CARDIOVASCULAR:  Negative for chest pain or palpitations.  GI:  Negative for abdominal discomfort, blood in stools or black stools or change in bowel habits.  MUSCULOSKELETAL:  See HPI.  SKIN:  Negative for lesions, rash, and itching.  PSYCH:  No mood disorder or recent psychosocial stressors.   HEMATOLOGY/LYMPHOLOGY:  Negative for prolonged bleeding, bruising easily or swollen nodes.    NEURO:   No history of syncope, paralysis, seizures or tremors.  All other reviewed and negative other than HPI.    OBJECTIVE:    There were no vitals taken for this visit.      Physical Exam    GENERAL: Well appearing, in no acute distress, alert and oriented x3.  PSYCH:  Mood and affect appropriate.  SKIN: Skin color, texture, turgor normal, no rashes or lesions.  HEAD/FACE:  Normocephalic, atraumatic. Cranial nerves grossly intact.    NECK:  pain  to palpation over the cervical paraspinous muscles. Spurling Negative. pain with neck flexion, extension, or lateral flexion.   Normal  strength bilaterally    CV: RRR with palpation of the radial artery.  PULM: No evidence of respiratory difficulty, symmetric chest rise.  GI:  Soft and non-tender.    BACK: Straight leg raising in the sitting and supine positions is negative to radicular pain.  pain to palpation over the facet joints of the lumbar spine or spinous processes. Normal range of motion without pain reproduction.  EXTREMITIES: Peripheral joint ROM is full and pain free without obvious instability or laxity in all four extremities. No deformities, edema, or skin discoloration. Good capillary refill.  MUSCULOSKELETAL: Able to stand on heels & toes.    hip, and knee provocative maneuvers are negative. pain with palpation over the sacroiliac joints bilaterally.  Gaenslen's, Distraction/Compression and  FABERs test is positive.  Facet loading test is positive bilaterally.   Bilateral upper and lower extremity strength is normal and symmetric.  No atrophy or tone abnormalities are noted.  Tenderness to palpation at 18/18 tender points.  RIGHT Lower extremity: Hip flexion 5/5, Hip Abduction 5/5, Hip Adduction 5/5, Knee extension 5/5, Knee flexion 5/5, Ankle dorsiflexion5/5, Extensor hallucis longus 5/5, Ankle plantarflexion 5/5  LEFT Lower extremity:  Hip flexion 5/5, Hip Abduction 5/5,Hip Adduction 5/5, Knee extension 5/5, Knee flexion 5/5, Ankle dorsiflexion 5/5, Extensor hallucis longus 5/5, Ankle plantarflexion 5/5  -Normal testing knee (patellar) jerk and ankle (achilles) jerk    NEURO: Bilateral upper and lower extremity coordination and muscle stretch reflexes are physiologic and symmetric. No loss of sensation is noted.  GAIT: normal.    Imagin22    MRI Lumbar Spine Without Contrast    FINDINGS:  Alignment: Normal.    Vertebrae: 5 lumbar-type vertebral bodies. No aggressive marrow  replacement process or fracture.  Likely hemangioma at the level of the LEFT L5 pedicle.    Discs: Satisfactory height and signal.    Cord: Normal. Conus terminates at .    Degenerative findings:    T12-L1: There is no focal disc herniation. No significant central canal narrowing . No significant neural foraminal narrowing.    L1-L2: There is no focal disc herniation. No significant central canal narrowing . No significant neural foraminal narrowing.    L2-L3: There is no focal disc herniation. No significant central canal narrowing . No significant neural foraminal narrowing.    L3-L4: The Broad based mild diffuse bulging of disc material .  No significant central canal narrowing . Mild bilateral neural foraminal narrowing.    L4-L5: Broad based mild diffuse bulging of disc material .  No significant central canal narrowing . Mild bilateral LEFT greater than RIGHT neural foraminal narrowing.    L5-S1: There is no focal disc herniation. No significant central canal narrowing . No significant neural foraminal narrowing.    Paraspinal muscles & soft tissues: Unremarkable.    Impression  Mild broad-based bulging of disc material L3-L4 and L4-L5 with mild bilateral neural foraminal encroachment.    07/13/22    CT Cervical Spine Without Contrast      FINDINGS:  The cervical vertebrae reveal normal alignment, shape and bone density. The cervical vertebra are intact without evidence of fracture or dislocation.    Multilevel anterior annular calcifications and anterior marginal spondylosis noted.  The disc heights are well maintained.  The thecal sac is patent.  Nerve root foramen are patent.    The surrounding neck soft tissues are normal.  The lung apices are clear.    8 mm low-attenuation focus in the right thyroid gland.  The thyroid gland is otherwise normal.    Impression  1.  Negative CT scan of the cervical spine. No evidence of fracture or subluxation.    2.  Similar degree of mild degenerative changes involving  the cervical spine mainly manifesting is anterior annular calcifications and anterior marginal spondylosis.    3.  8 mm low-attenuation focus in the left thyroid lobe noted.     07/13/22    X-Ray Lumbar Spine Ap And Lateral    FINDINGS:  There are 5 weight bearing lumbar vertebra.  Minor multilevel marginal spondylosis.  The vertebral body heights and intervertebral disc heights are   well-maintained. Negative for spondylolysis or spondylolisthesis. The sacral ala and sacroiliac joints are intact. The bowel gas pattern is normal.    06/08/21    X-Ray Cervical Spine AP And Lateral      FINDINGS:  Mild moderate multilevel degenerative joint disease of the cervical spine.    No evidence of acute fracture or dislocation.  Bony mineralization is normal.  Soft tissues are unremarkable.      ASSESSMENT: 52 y.o. year old female with mid to lower back pain, consistent with     1. Lumbar radiculopathy  IR ECHO Lumbar w/ Img    Case Request-RAD/Other Procedure Area: L4-5 interlaminar epidural steroid injection with right paramedian approach with RN IV sedation      2. Myofascial pain        3. Fibromyalgia        4. Lumbar spondylosis                PLAN:   - Interventions:  Schedule for L4-5 interlaminar epidural steroid injection with right paramedian approach to see if this helps with lower back and right-sided radicular pain.  Explained the risks and benefits of the procedure in detail with the patient today in clinic along with alternative treatment options.  Patient has elected to pursue this procedure    - Anticoagulation use: yes aspirin  Per ASA guidelines for primary prophylaxis, patient will need to pause aspirin 5 days prior to her procedure.  Will obtain clearance from PCP, Dr. Edwards.     report:  Reviewed and consistent with medication use as prescribed.    - Medications:  -patient reports side effects with Cymbalta medication.  We will hold off on pharmacologic management at this time and continue  interventional therapy.      - Therapy:   -Patient demonstrates diffuse myalgias, tenderness to palpation at 18/18 tender points compounded by a constellation of symptoms including insomnia, headaches and cystitis, chronic fatigue, consistent with fibromyalgia.  I discussed with the patient the diagnosis of fibromyalgia. We advised the patient to participate in aerobic exercise for at least 6 days a week for 60 minutes per day to the point of sweating.  We counseled the patient that this course leads to the best results for patients are suffering with fibromyalgia symptoms.  The patient was receptive to the counseling and agrees to proceed.    -We discussed initiating physical therapy to help manage the patient/s painful condition. The patient was counseled that muscle strengthening will improve the long term prognosis in regards to pain and may also help increase range of motion and mobility. They were told that one of the goals of physical therapy is that they learn how to do the exercises so that they can do them independently at home daily upon completion. The patient's questions were answered and they were agreeable to this course. A referral for external physical therapy: [facility] was provided to the patient to accommodate her work schedule.    - Imaging: Reviewed available imaging with patient and answered any questions they had regarding study    - Follow up visit: return to clinic in 4-6 weeks post injection      The above plan and management options were discussed at length with patient. Patient is in agreement with the above and verbalized understanding.    - I discussed the goals of interventional chronic pain management with the patient on today's visit. We discussed a multimodal and systematic approach to pain.  This includes diagnostic and therapeutic injections, adjuvant pharmacologic treatment, physical therapy, and at times psychiatry.  I emphasized the importance of regular exercise, core  strengthening and stretching, diet and weight loss as a cornerstone of long-term pain management.    - This condition does not require this patient to take time off of work, and the primary goal of our Pain Management services is to improve the patient's functional capacity.  - Patient Questions: Answered all of the patient's questions regarding diagnoses, therapy, treatment and next steps        Conrad Schwartz MD  Interventional Pain Management  [facility] [place]    Disclaimer:  This note was prepared using voice recognition system and is likely to have sound alike errors that may have been overlooked even after proof reading.  Please call me with any questions

## 2022-10-12 ENCOUNTER — OFFICE VISIT (OUTPATIENT)
Dept: PAIN MEDICINE | Facility: CLINIC | Age: 52
End: 2022-10-12
Payer: COMMERCIAL

## 2022-10-12 DIAGNOSIS — M79.7 FIBROMYALGIA: ICD-10-CM

## 2022-10-12 DIAGNOSIS — M54.16 LUMBAR RADICULOPATHY: Primary | ICD-10-CM

## 2022-10-12 DIAGNOSIS — M47.816 LUMBAR SPONDYLOSIS: ICD-10-CM

## 2022-10-12 DIAGNOSIS — M79.18 MYOFASCIAL PAIN: ICD-10-CM

## 2022-10-12 PROCEDURE — 1160F PR REVIEW ALL MEDS BY PRESCRIBER/CLIN PHARMACIST DOCUMENTED: ICD-10-PCS | Mod: CPTII,95,, | Performed by: ANESTHESIOLOGY

## 2022-10-12 PROCEDURE — 1159F MED LIST DOCD IN RCRD: CPT | Mod: CPTII,95,, | Performed by: ANESTHESIOLOGY

## 2022-10-12 PROCEDURE — 99214 PR OFFICE/OUTPT VISIT, EST, LEVL IV, 30-39 MIN: ICD-10-PCS | Mod: 95,,, | Performed by: ANESTHESIOLOGY

## 2022-10-12 PROCEDURE — 1159F PR MEDICATION LIST DOCUMENTED IN MEDICAL RECORD: ICD-10-PCS | Mod: CPTII,95,, | Performed by: ANESTHESIOLOGY

## 2022-10-12 PROCEDURE — 1160F RVW MEDS BY RX/DR IN RCRD: CPT | Mod: CPTII,95,, | Performed by: ANESTHESIOLOGY

## 2022-10-12 PROCEDURE — 99214 OFFICE O/P EST MOD 30 MIN: CPT | Mod: 95,,, | Performed by: ANESTHESIOLOGY

## 2022-10-17 ENCOUNTER — TELEPHONE (OUTPATIENT)
Dept: PAIN MEDICINE | Facility: CLINIC | Age: 52
End: 2022-10-17
Payer: COMMERCIAL

## 2022-10-20 ENCOUNTER — TELEPHONE (OUTPATIENT)
Dept: PAIN MEDICINE | Facility: CLINIC | Age: 52
End: 2022-10-20
Payer: COMMERCIAL

## 2022-10-21 ENCOUNTER — TELEPHONE (OUTPATIENT)
Dept: PAIN MEDICINE | Facility: CLINIC | Age: 52
End: 2022-10-21
Payer: COMMERCIAL

## 2022-11-01 ENCOUNTER — TELEPHONE (OUTPATIENT)
Dept: PAIN MEDICINE | Facility: CLINIC | Age: 52
End: 2022-11-01
Payer: COMMERCIAL

## 2023-04-11 ENCOUNTER — PATIENT MESSAGE (OUTPATIENT)
Dept: RESEARCH | Facility: HOSPITAL | Age: 53
End: 2023-04-11
Payer: COMMERCIAL

## 2023-06-08 ENCOUNTER — HOSPITAL ENCOUNTER (INPATIENT)
Facility: HOSPITAL | Age: 53
LOS: 2 days | Discharge: HOME | DRG: 391 | End: 2023-06-11
Attending: EMERGENCY MEDICINE | Admitting: INTERNAL MEDICINE
Payer: COMMERCIAL

## 2023-06-08 DIAGNOSIS — R93.5 ABNORMAL ABDOMINAL CT SCAN: ICD-10-CM

## 2023-06-08 DIAGNOSIS — R82.81 PYURIA: ICD-10-CM

## 2023-06-08 DIAGNOSIS — K63.0 ABSCESS OF SIGMOID COLON: ICD-10-CM

## 2023-06-08 DIAGNOSIS — K65.1 INTRA-ABDOMINAL ABSCESS: ICD-10-CM

## 2023-06-08 DIAGNOSIS — K52.9 COLITIS: Primary | ICD-10-CM

## 2023-06-08 LAB
ALBUMIN SERPL BCP-MCNC: 3.5 G/DL (ref 3.5–5.2)
ALP SERPL-CCNC: 110 U/L (ref 55–135)
ALT SERPL W/O P-5'-P-CCNC: 20 U/L (ref 10–44)
ANION GAP SERPL CALC-SCNC: 11 MMOL/L (ref 8–16)
AST SERPL-CCNC: 22 U/L (ref 10–40)
BACTERIA #/AREA URNS HPF: ABNORMAL /HPF
BACTERIA GENITAL QL WET PREP: ABNORMAL
BASOPHILS # BLD AUTO: 0.04 K/UL (ref 0–0.2)
BASOPHILS NFR BLD: 0.4 % (ref 0–1.9)
BILIRUB SERPL-MCNC: 0.4 MG/DL (ref 0.1–1)
BILIRUB UR QL STRIP: NEGATIVE
BUN SERPL-MCNC: 11 MG/DL (ref 6–20)
CALCIUM SERPL-MCNC: 10 MG/DL (ref 8.7–10.5)
CHLORIDE SERPL-SCNC: 104 MMOL/L (ref 95–110)
CLARITY UR: ABNORMAL
CLUE CELLS VAG QL WET PREP: ABNORMAL
CO2 SERPL-SCNC: 23 MMOL/L (ref 23–29)
COLOR UR: YELLOW
CREAT SERPL-MCNC: 0.9 MG/DL (ref 0.5–1.4)
DIFFERENTIAL METHOD: ABNORMAL
EOSINOPHIL # BLD AUTO: 0.1 K/UL (ref 0–0.5)
EOSINOPHIL NFR BLD: 0.7 % (ref 0–8)
ERYTHROCYTE [DISTWIDTH] IN BLOOD BY AUTOMATED COUNT: 14.1 % (ref 11.5–14.5)
EST. GFR  (NO RACE VARIABLE): >60 ML/MIN/1.73 M^2
FILAMENT FUNGI VAG WET PREP-#/AREA: ABNORMAL
GLUCOSE SERPL-MCNC: 101 MG/DL (ref 70–110)
GLUCOSE UR QL STRIP: NEGATIVE
HCT VFR BLD AUTO: 37 % (ref 37–48.5)
HGB BLD-MCNC: 11.5 G/DL (ref 12–16)
HGB UR QL STRIP: ABNORMAL
HYALINE CASTS #/AREA URNS LPF: 1 /LPF
IMM GRANULOCYTES # BLD AUTO: 0.02 K/UL (ref 0–0.04)
IMM GRANULOCYTES NFR BLD AUTO: 0.2 % (ref 0–0.5)
KETONES UR QL STRIP: ABNORMAL
LEUKOCYTE ESTERASE UR QL STRIP: ABNORMAL
LYMPHOCYTES # BLD AUTO: 2.7 K/UL (ref 1–4.8)
LYMPHOCYTES NFR BLD: 29.3 % (ref 18–48)
MCH RBC QN AUTO: 24.9 PG (ref 27–31)
MCHC RBC AUTO-ENTMCNC: 31.1 G/DL (ref 32–36)
MCV RBC AUTO: 80 FL (ref 82–98)
MICROSCOPIC COMMENT: ABNORMAL
MONOCYTES # BLD AUTO: 0.8 K/UL (ref 0.3–1)
MONOCYTES NFR BLD: 8.2 % (ref 4–15)
NEUTROPHILS # BLD AUTO: 5.7 K/UL (ref 1.8–7.7)
NEUTROPHILS NFR BLD: 61.2 % (ref 38–73)
NITRITE UR QL STRIP: NEGATIVE
NRBC BLD-RTO: 0 /100 WBC
PH UR STRIP: 6 [PH] (ref 5–8)
PLATELET # BLD AUTO: 318 K/UL (ref 150–450)
PMV BLD AUTO: 9.7 FL (ref 9.2–12.9)
POTASSIUM SERPL-SCNC: 3.7 MMOL/L (ref 3.5–5.1)
PROT SERPL-MCNC: 7.8 G/DL (ref 6–8.4)
PROT UR QL STRIP: ABNORMAL
RBC # BLD AUTO: 4.62 M/UL (ref 4–5.4)
RBC #/AREA URNS HPF: 26 /HPF (ref 0–4)
SODIUM SERPL-SCNC: 138 MMOL/L (ref 136–145)
SP GR UR STRIP: >1.03 (ref 1–1.03)
SPECIMEN SOURCE: ABNORMAL
SQUAMOUS #/AREA URNS HPF: 2 /HPF
T VAGINALIS GENITAL QL WET PREP: ABNORMAL
URN SPEC COLLECT METH UR: ABNORMAL
UROBILINOGEN UR STRIP-ACNC: ABNORMAL EU/DL
WBC # BLD AUTO: 9.23 K/UL (ref 3.9–12.7)
WBC #/AREA URNS HPF: >100 /HPF (ref 0–5)
WBC #/AREA VAG WET PREP: ABNORMAL
WBC CLUMPS URNS QL MICRO: ABNORMAL
YEAST GENITAL QL WET PREP: ABNORMAL

## 2023-06-08 PROCEDURE — 63600175 PHARM REV CODE 636 W HCPCS: Performed by: EMERGENCY MEDICINE

## 2023-06-08 PROCEDURE — 96361 HYDRATE IV INFUSION ADD-ON: CPT

## 2023-06-08 PROCEDURE — 25000003 PHARM REV CODE 250: Performed by: EMERGENCY MEDICINE

## 2023-06-08 PROCEDURE — 96365 THER/PROPH/DIAG IV INF INIT: CPT

## 2023-06-08 PROCEDURE — 96375 TX/PRO/DX INJ NEW DRUG ADDON: CPT

## 2023-06-08 PROCEDURE — 85025 COMPLETE CBC W/AUTO DIFF WBC: CPT | Performed by: NURSE PRACTITIONER

## 2023-06-08 PROCEDURE — 25500020 PHARM REV CODE 255: Performed by: EMERGENCY MEDICINE

## 2023-06-08 PROCEDURE — 87086 URINE CULTURE/COLONY COUNT: CPT | Performed by: NURSE PRACTITIONER

## 2023-06-08 PROCEDURE — 80053 COMPREHEN METABOLIC PANEL: CPT | Performed by: NURSE PRACTITIONER

## 2023-06-08 PROCEDURE — 99285 EMERGENCY DEPT VISIT HI MDM: CPT | Mod: 25

## 2023-06-08 PROCEDURE — 87210 SMEAR WET MOUNT SALINE/INK: CPT | Performed by: NURSE PRACTITIONER

## 2023-06-08 PROCEDURE — 81000 URINALYSIS NONAUTO W/SCOPE: CPT | Performed by: NURSE PRACTITIONER

## 2023-06-08 RX ORDER — MORPHINE SULFATE 4 MG/ML
4 INJECTION, SOLUTION INTRAMUSCULAR; INTRAVENOUS
Status: COMPLETED | OUTPATIENT
Start: 2023-06-08 | End: 2023-06-08

## 2023-06-08 RX ORDER — ONDANSETRON 2 MG/ML
4 INJECTION INTRAMUSCULAR; INTRAVENOUS
Status: COMPLETED | OUTPATIENT
Start: 2023-06-08 | End: 2023-06-08

## 2023-06-08 RX ADMIN — MORPHINE SULFATE 4 MG: 4 INJECTION INTRAVENOUS at 10:06

## 2023-06-08 RX ADMIN — IOHEXOL 100 ML: 350 INJECTION, SOLUTION INTRAVENOUS at 10:06

## 2023-06-08 RX ADMIN — CEFTRIAXONE 1 G: 1 INJECTION, POWDER, FOR SOLUTION INTRAMUSCULAR; INTRAVENOUS at 10:06

## 2023-06-08 RX ADMIN — SODIUM CHLORIDE, SODIUM LACTATE, POTASSIUM CHLORIDE, AND CALCIUM CHLORIDE 1000 ML: .6; .31; .03; .02 INJECTION, SOLUTION INTRAVENOUS at 11:06

## 2023-06-08 RX ADMIN — ONDANSETRON 4 MG: 2 INJECTION INTRAMUSCULAR; INTRAVENOUS at 10:06

## 2023-06-09 PROBLEM — K65.1 INTRA-ABDOMINAL ABSCESS: Status: ACTIVE | Noted: 2023-06-09

## 2023-06-09 PROBLEM — R93.5 ABNORMAL ABDOMINAL CT SCAN: Status: ACTIVE | Noted: 2023-06-09

## 2023-06-09 PROBLEM — K52.9 COLITIS: Status: ACTIVE | Noted: 2023-06-09

## 2023-06-09 PROCEDURE — 96361 HYDRATE IV INFUSION ADD-ON: CPT

## 2023-06-09 PROCEDURE — 25000003 PHARM REV CODE 250: Performed by: EMERGENCY MEDICINE

## 2023-06-09 PROCEDURE — 63600175 PHARM REV CODE 636 W HCPCS: Performed by: INTERNAL MEDICINE

## 2023-06-09 PROCEDURE — 99222 PR INITIAL HOSPITAL CARE,LEVL II: ICD-10-PCS | Mod: ,,,

## 2023-06-09 PROCEDURE — 99223 1ST HOSP IP/OBS HIGH 75: CPT | Mod: ,,, | Performed by: SURGERY

## 2023-06-09 PROCEDURE — 96376 TX/PRO/DX INJ SAME DRUG ADON: CPT

## 2023-06-09 PROCEDURE — 99223 1ST HOSP IP/OBS HIGH 75: CPT | Mod: ,,, | Performed by: PHYSICIAN ASSISTANT

## 2023-06-09 PROCEDURE — S0030 INJECTION, METRONIDAZOLE: HCPCS | Performed by: EMERGENCY MEDICINE

## 2023-06-09 PROCEDURE — 99223 PR INITIAL HOSPITAL CARE,LEVL III: ICD-10-PCS | Mod: ,,, | Performed by: PHYSICIAN ASSISTANT

## 2023-06-09 PROCEDURE — 99223 PR INITIAL HOSPITAL CARE,LEVL III: ICD-10-PCS | Mod: ,,, | Performed by: SURGERY

## 2023-06-09 PROCEDURE — 96367 TX/PROPH/DG ADDL SEQ IV INF: CPT

## 2023-06-09 PROCEDURE — 21400001 HC TELEMETRY ROOM

## 2023-06-09 PROCEDURE — 96366 THER/PROPH/DIAG IV INF ADDON: CPT

## 2023-06-09 PROCEDURE — 99222 1ST HOSP IP/OBS MODERATE 55: CPT | Mod: ,,,

## 2023-06-09 PROCEDURE — 96375 TX/PRO/DX INJ NEW DRUG ADDON: CPT

## 2023-06-09 PROCEDURE — 25000003 PHARM REV CODE 250: Performed by: INTERNAL MEDICINE

## 2023-06-09 RX ORDER — PROCHLORPERAZINE EDISYLATE 5 MG/ML
2.5 INJECTION INTRAMUSCULAR; INTRAVENOUS EVERY 6 HOURS PRN
Status: DISCONTINUED | OUTPATIENT
Start: 2023-06-09 | End: 2023-06-11 | Stop reason: HOSPADM

## 2023-06-09 RX ORDER — ACETAMINOPHEN 325 MG/1
650 TABLET ORAL EVERY 6 HOURS PRN
Status: DISCONTINUED | OUTPATIENT
Start: 2023-06-09 | End: 2023-06-11 | Stop reason: HOSPADM

## 2023-06-09 RX ORDER — METRONIDAZOLE 500 MG/100ML
500 INJECTION, SOLUTION INTRAVENOUS
Status: DISCONTINUED | OUTPATIENT
Start: 2023-06-09 | End: 2023-06-11 | Stop reason: HOSPADM

## 2023-06-09 RX ORDER — CIPROFLOXACIN 2 MG/ML
400 INJECTION, SOLUTION INTRAVENOUS
Status: DISCONTINUED | OUTPATIENT
Start: 2023-06-09 | End: 2023-06-11 | Stop reason: HOSPADM

## 2023-06-09 RX ORDER — SODIUM CHLORIDE 9 MG/ML
INJECTION, SOLUTION INTRAVENOUS CONTINUOUS
Status: ACTIVE | OUTPATIENT
Start: 2023-06-09 | End: 2023-06-10

## 2023-06-09 RX ORDER — MORPHINE SULFATE 2 MG/ML
2 INJECTION, SOLUTION INTRAMUSCULAR; INTRAVENOUS EVERY 4 HOURS PRN
Status: DISCONTINUED | OUTPATIENT
Start: 2023-06-09 | End: 2023-06-11 | Stop reason: HOSPADM

## 2023-06-09 RX ORDER — MORPHINE SULFATE 4 MG/ML
2 INJECTION, SOLUTION INTRAMUSCULAR; INTRAVENOUS EVERY 4 HOURS PRN
Status: DISCONTINUED | OUTPATIENT
Start: 2023-06-09 | End: 2023-06-09 | Stop reason: CLARIF

## 2023-06-09 RX ORDER — HYDRALAZINE HYDROCHLORIDE 20 MG/ML
10 INJECTION INTRAMUSCULAR; INTRAVENOUS EVERY 8 HOURS PRN
Status: DISCONTINUED | OUTPATIENT
Start: 2023-06-09 | End: 2023-06-11 | Stop reason: HOSPADM

## 2023-06-09 RX ORDER — GUAIFENESIN 100 MG/5ML
200 SOLUTION ORAL EVERY 4 HOURS PRN
Status: DISCONTINUED | OUTPATIENT
Start: 2023-06-09 | End: 2023-06-11 | Stop reason: HOSPADM

## 2023-06-09 RX ORDER — MAG HYDROX/ALUMINUM HYD/SIMETH 200-200-20
30 SUSPENSION, ORAL (FINAL DOSE FORM) ORAL EVERY 6 HOURS PRN
Status: DISCONTINUED | OUTPATIENT
Start: 2023-06-09 | End: 2023-06-11 | Stop reason: HOSPADM

## 2023-06-09 RX ORDER — ONDANSETRON 2 MG/ML
4 INJECTION INTRAMUSCULAR; INTRAVENOUS EVERY 8 HOURS PRN
Status: DISCONTINUED | OUTPATIENT
Start: 2023-06-09 | End: 2023-06-11 | Stop reason: HOSPADM

## 2023-06-09 RX ADMIN — PROCHLORPERAZINE EDISYLATE 2.5 MG: 5 INJECTION INTRAMUSCULAR; INTRAVENOUS at 03:06

## 2023-06-09 RX ADMIN — CIPROFLOXACIN 400 MG: 2 INJECTION, SOLUTION INTRAVENOUS at 08:06

## 2023-06-09 RX ADMIN — METRONIDAZOLE 500 MG: 5 INJECTION, SOLUTION INTRAVENOUS at 01:06

## 2023-06-09 RX ADMIN — SODIUM CHLORIDE: 9 INJECTION, SOLUTION INTRAVENOUS at 03:06

## 2023-06-09 RX ADMIN — MORPHINE SULFATE 2 MG: 4 INJECTION INTRAVENOUS at 03:06

## 2023-06-09 RX ADMIN — CIPROFLOXACIN 400 MG: 2 INJECTION, SOLUTION INTRAVENOUS at 05:06

## 2023-06-09 RX ADMIN — PROCHLORPERAZINE EDISYLATE 2.5 MG: 5 INJECTION INTRAMUSCULAR; INTRAVENOUS at 08:06

## 2023-06-09 RX ADMIN — METRONIDAZOLE 500 MG: 5 INJECTION, SOLUTION INTRAVENOUS at 06:06

## 2023-06-09 RX ADMIN — METRONIDAZOLE 500 MG: 5 INJECTION, SOLUTION INTRAVENOUS at 09:06

## 2023-06-09 RX ADMIN — ONDANSETRON 4 MG: 2 INJECTION INTRAMUSCULAR; INTRAVENOUS at 03:06

## 2023-06-09 RX ADMIN — SODIUM CHLORIDE: 9 INJECTION, SOLUTION INTRAVENOUS at 06:06

## 2023-06-09 NOTE — SUBJECTIVE & OBJECTIVE
History reviewed. No pertinent past medical history.    Past Surgical History:   Procedure Laterality Date    HYSTERECTOMY      Partial       Review of patient's allergies indicates:   Allergen Reactions    Codeine      Other reaction(s): Unknown    Hydrocodone-acetaminophen     Penicillins        No current facility-administered medications on file prior to encounter.     Current Outpatient Medications on File Prior to Encounter   Medication Sig    amitriptyline (ELAVIL) 25 MG tablet Take 1 tablet (25 mg total) by mouth every evening.    aspirin (ECOTRIN) 81 MG EC tablet Take 81 mg by mouth.    azelastine (ASTELIN) 137 mcg (0.1 %) nasal spray SMARTSIG:Both Nares    cyclobenzaprine (FLEXERIL) 5 MG tablet Take 5 mg by mouth 3 (three) times daily as needed.    dexbrompheniramine-phenyleph (ALAHIST PE) 2-7.5 mg Tab 1 tablet    DULoxetine (CYMBALTA) 30 MG capsule One tab at night for 3days then increase  to one tab po bid    LIDOcaine (LIDODERM) 5 %     meclizine (ANTIVERT) 25 mg tablet One tab po q 6hours prn dizziness    montelukast (SINGULAIR) 10 mg tablet Take 10 mg by mouth once daily.    naproxen (NAPROSYN) 500 MG tablet Take 1 tablet (500 mg total) by mouth 2 (two) times daily with meals. (Patient not taking: No sig reported)    pregabalin (LYRICA) 75 MG capsule Take 1 capsule, 75 mg, once daily for 1 week.  Followed by take 1 capsule 75 mg twice daily for 1 week.  Followed by 2 capsules, 150 mg in the morning and 75 mg at night for 1 week.  Followed by 150 mg twice daily for 1 week.  Followed by 225 mg in the morning and 150 mg in the evening for 1 week.  Followed by 225 mg b.i.d.. (Patient not taking: Reported on 9/20/2022)     Family History    None       Tobacco Use    Smoking status: Never    Smokeless tobacco: Never   Substance and Sexual Activity    Alcohol use: Not Currently    Drug use: Never    Sexual activity: Yes     Review of Systems   Constitutional: Negative.  Negative for chills and fever.   HENT:  Negative.  Negative for congestion, rhinorrhea, sore throat and trouble swallowing.    Eyes: Negative.  Negative for visual disturbance.   Respiratory: Negative.  Negative for cough, shortness of breath and wheezing.    Cardiovascular: Negative.  Negative for chest pain and palpitations.   Gastrointestinal:  Positive for abdominal pain and nausea. Negative for diarrhea and vomiting.   Endocrine: Negative.    Genitourinary: Negative.  Negative for dysuria and flank pain.   Musculoskeletal: Negative.  Negative for back pain.   Skin: Negative.  Negative for rash.   Allergic/Immunologic: Negative.    Neurological: Negative.  Negative for speech difficulty, weakness, numbness and headaches.   Hematological: Negative.    Psychiatric/Behavioral: Negative.  Negative for hallucinations. The patient is not nervous/anxious.    All other systems reviewed and are negative.  Objective:     Vital Signs (Most Recent):  Temp: 97.8 °F (36.6 °C) (06/09/23 0353)  Pulse: 61 (06/09/23 0353)  Resp: 14 (06/09/23 0353)  BP: 130/72 (06/09/23 0353)  SpO2: 96 % (06/09/23 0353) Vital Signs (24h Range):  Temp:  [97.8 °F (36.6 °C)-98.7 °F (37.1 °C)] 97.8 °F (36.6 °C)  Pulse:  [60-82] 61  Resp:  [14-20] 14  SpO2:  [96 %-98 %] 96 %  BP: (116-139)/(67-87) 130/72     Weight: 96 kg (211 lb 10.3 oz)  Body mass index is 32.18 kg/m².     Physical Exam  Vitals and nursing note reviewed.   Constitutional:       General: She is awake. She is not in acute distress.     Appearance: She is not ill-appearing.   HENT:      Head: Normocephalic and atraumatic.      Mouth/Throat:      Mouth: Mucous membranes are moist.   Eyes:      General: No scleral icterus.     Conjunctiva/sclera: Conjunctivae normal.   Cardiovascular:      Rate and Rhythm: Normal rate and regular rhythm.      Heart sounds: No murmur heard.  Pulmonary:      Effort: Pulmonary effort is normal. No respiratory distress.      Breath sounds: Normal breath sounds. No wheezing.   Abdominal:       Palpations: Abdomen is soft.      Tenderness: There is abdominal tenderness (Generalized, but more so in the lower abdomen.  No guarding, no rigidity.).   Musculoskeletal:         General: No swelling. Normal range of motion.      Cervical back: Normal range of motion and neck supple.   Skin:     General: Skin is warm.      Coloration: Skin is not jaundiced.   Neurological:      General: No focal deficit present.      Mental Status: She is alert and oriented to person, place, and time. Mental status is at baseline.   Psychiatric:         Attention and Perception: Attention normal.         Mood and Affect: Mood normal.         Speech: Speech normal.         Behavior: Behavior normal. Behavior is cooperative.              Significant Labs: All pertinent labs within the past 24 hours have been reviewed.  CBC:   Recent Labs   Lab 06/08/23 1953   WBC 9.23   HGB 11.5*   HCT 37.0        CMP:   Recent Labs   Lab 06/08/23 1953      K 3.7      CO2 23      BUN 11   CREATININE 0.9   CALCIUM 10.0   PROT 7.8   ALBUMIN 3.5   BILITOT 0.4   ALKPHOS 110   AST 22   ALT 20   ANIONGAP 11     Urine Studies:   Recent Labs   Lab 06/08/23 1949   COLORU Yellow   APPEARANCEUA Hazy*   PHUR 6.0   SPECGRAV >1.030*   PROTEINUA 1+*   GLUCUA Negative   KETONESU 2+*   BILIRUBINUA Negative   OCCULTUA 2+*   NITRITE Negative   UROBILINOGEN 2.0-3.0*   LEUKOCYTESUR 3+*   RBCUA 26*   WBCUA >100*   BACTERIA Rare   SQUAMEPITHEL 2   HYALINECASTS 1       Significant Imaging: I have reviewed all pertinent imaging results/findings within the past 24 hours.  I have reviewed and interpreted all pertinent imaging results/findings within the past 24 hours.    Imaging Results              CT Abdomen Pelvis With Contrast (Final result)  Result time 06/08/23 23:16:47      Final result by Ethan Hamilton MD (06/08/23 23:16:47)                   Impression:      No bowel obstruction.  Fat stranding and mucosal thickening of the rectosigmoid  colon.  Perirectal fat stranding leads to question loculated fluid collections which may relate to foci of abscess.  Underlying enteric fistula is not excluded.  The question foci of abscess measures 3.0 by 3.5 and 2.8 by 2.4 and appear multiloculated. The vaginal cuff also appears mildly thickened adjacent to the area of presumed infectious inflammatory change.    All CT scans at this facility use dose modulation, iterative reconstruction, and/or weight based dosing when appropriate to reduce radiation dose to as low as reasonably achievable.      Electronically signed by: Ethan Hamilton  Date:    06/08/2023  Time:    23:16               Narrative:    EXAMINATION:  CT ABDOMEN PELVIS WITH CONTRAST    CLINICAL HISTORY:  LLQ abdominal pain;    TECHNIQUE:  Low dose axial images, sagittal and coronal reformations were obtained from the lung bases to the pubic symphysis following the IV administration of 100 mL of Omnipaque 350.    COMPARISON:  None    FINDINGS:  Heart: Normal size as far as seen. No effusion as far as seen.    Lung Bases: Clear.    Liver: Fatty infiltration of the liver no focal lesions.    Gallbladder: No calcified gallstones.    Bile Ducts: No dilatation.    Pancreas: No mass. No peripancreatic fat stranding.    Spleen: Normal.    Adrenals: Normal.    Kidneys/Ureters: Normal enhancement.  No mass or  hydroureteronephrosis.    Bladder: No wall thickening.    Reproductive organs: Patient is status post hysterectomy.  The vaginal cuff also appears mildly thickened adjacent to the area of described inflammatory infectious change.    GI Tract/Mesentery: No bowel obstruction.  Fat stranding and mucosal thickening of the rectosigmoid colon..  Colonic fat stranding leads to question loculated fluid collections which may relate to foci of abscess.  Underlying enteric fistula is not excluded.  The question foci of abscess measures 3.0 by 3.5 and 2.8 by 2.4 and appear multiloculated.  No evidence of acute  appendicitis.    Peritoneal Space: No ascites or free air.    Retroperitoneum: No significant adenopathy.    Abdominal wall: Normal.    Vasculature: No aneurysm.    Bones: No acute fracture. No suspicious lytic or sclerotic lesions.                                      I have independently reviewed all pertinent labs within the past 24 hours.    I have independently reviewed, visualized and interpreted all pertinent imaging results within the past 24 hours and discussed the findings with the ED physician, Dr. Carvajal

## 2023-06-09 NOTE — ASSESSMENT & PLAN NOTE
-two loculated abscesses around the rectosigmoid colon.    -3.0 x 3.5 cm, 2.8 x 2.4 cm.    -continue IV ciprofloxacin, IV metronidazole.    -general surgery consulted.    -keep NPO.    -IV fluids.

## 2023-06-09 NOTE — CONSULTS
[place] [facility] ([facility])  Gastroenterology  Consult Note    Patient Name: Kaylan Lal  MRN: [id number]  Admission Date: 6/8/2023  Hospital Length of Stay: 0 days  Code Status: Full Code   Attending Provider: Smita Sandoval MD   Consulting Provider: Castro Aggarwal PA-C  Primary Care Physician: Jyothi Edwards MD  Principal Problem:Intra-abdominal abscess    Inpatient consult to Gastroenterology  Consult performed by: Castro Aggarwal PA-C  Consult ordered by: Cameron Monahan MD  Reason for consult: possible UC/Crohn's        Subjective:     HPI:  The patient presented to the ER for hematuria. She described suprapubic pressure radiating into her vagina which started about 6 days ago. The pain has been intermittent. BC Powder helped the pain. Nothing else increased or decreased the pain. She had chills the first two days but didn't take her temperature. She reports decreased fatigue and decreased appetite. She thought the pressure may have been from constipation and took a suppository. She otherwise denies vomiting, diarrhea, hematochezia or melena. She has never had a colonoscopy. She denies a family history of colon cancer or IBD. She had a hysterectomy in 2007 and sees her doctor as prescribed. ER work up revealed a Hgb of 11.5 but otherwise normal CBC and CMP. UA revealed 26 RBCs, >100 WBCs, nitrite negative, bacteria rare. Vaginal screen showed moderate bacteria and many WBCs. CT scan showed fat stranding and mucosal thickening of the rectosigmoid colon.  Perirectal fat stranding leads to question loculated fluid collections which may relate to foci of abscess.  Underlying enteric fistula is not excluded.  The question foci of abscess measures 3.0 by 3.5 and 2.8 by 2.4 and appear multiloculated. The vaginal cuff also appears mildly thickened adjacent to the area of presumed infectious inflammatory change. The patient was started on Cipro and Flagyl. IR was consulted for abscess drainage but were  "unable "Due to position high in the pelvis, multiple small loculations, and proximity to surrounding structures (bowel, bladder), these pelvic abscesses are not drainable." General Surgery was also consulted.       History reviewed. No pertinent past medical history.    Past Surgical History:   Procedure Laterality Date    HYSTERECTOMY      Partial       Review of patient's allergies indicates:   Allergen Reactions    Codeine      Other reaction(s): Unknown    Hydrocodone-acetaminophen     Penicillins      Family History    None       Tobacco Use    Smoking status: Never    Smokeless tobacco: Never   Substance and Sexual Activity    Alcohol use: Not Currently    Drug use: Never    Sexual activity: Yes     Review of Systems   Constitutional:  Positive for fatigue. Negative for fever.   HENT:  Negative for hearing loss.    Eyes:  Negative for visual disturbance.   Respiratory:  Negative for cough and shortness of breath.    Cardiovascular:  Negative for chest pain.   Gastrointestinal:         As per HPI.   Genitourinary:  Positive for hematuria. Negative for dysuria and frequency.   Musculoskeletal:  Negative for arthralgias and back pain.   Skin:  Negative for rash.   Neurological:  Positive for dizziness. Negative for seizures, syncope, numbness and headaches.   Hematological:  Does not bruise/bleed easily.   Psychiatric/Behavioral:  The patient is not nervous/anxious.    Objective:     Vital Signs (Most Recent):  Temp: 98.9 °F (37.2 °C) (06/09/23 0730)  Pulse: 60 (06/09/23 0730)  Resp: 16 (06/09/23 0730)  BP: (!) 102/59 (06/09/23 0730)  SpO2: (!) 93 % (06/09/23 0730) Vital Signs (24h Range):  Temp:  [97.8 °F (36.6 °C)-98.9 °F (37.2 °C)] 98.9 °F (37.2 °C)  Pulse:  [60-82] 60  Resp:  [14-20] 16  SpO2:  [93 %-98 %] 93 %  BP: (102-139)/(59-87) 102/59     Weight: 96 kg (211 lb 10.3 oz) (06/09/23 0353)  Body mass index is 32.18 kg/m².      Intake/Output Summary (Last 24 hours) at 6/9/2023 0910  Last data filed " at 6/9/2023 0627  Gross per 24 hour   Intake 672.37 ml   Output --   Net 672.37 ml       Lines/Drains/Airways       Peripheral Intravenous Line  Duration                  Peripheral IV - Single Lumen 06/08/23 2220 20 G Right Antecubital <1 day                     Physical Exam  Constitutional:       General: She is not in acute distress.     Appearance: Normal appearance. She is well-developed.   HENT:      Head: Normocephalic and atraumatic.   Eyes:      Extraocular Movements: Extraocular movements intact.   Cardiovascular:      Rate and Rhythm: Normal rate and regular rhythm.      Heart sounds: No murmur heard.  Pulmonary:      Effort: Pulmonary effort is normal. No respiratory distress.      Breath sounds: Normal breath sounds. No wheezing.   Abdominal:      General: Bowel sounds are normal. There is no distension.      Palpations: Abdomen is soft. There is no mass.      Tenderness: There is abdominal tenderness in the suprapubic area. There is no guarding or rebound.   Musculoskeletal:      Cervical back: Normal range of motion and neck supple.      Right lower leg: No edema.      Left lower leg: No edema.   Skin:     General: Skin is warm and dry.      Findings: No rash.   Neurological:      Mental Status: She is alert and oriented to person, place, and time.      Cranial Nerves: No cranial nerve deficit.   Psychiatric:         Behavior: Behavior normal.        Significant Labs:  CBC:   Recent Labs   Lab 06/08/23 1953   WBC 9.23   HGB 11.5*   HCT 37.0        CMP:   Recent Labs   Lab 06/08/23 1953      CALCIUM 10.0   ALBUMIN 3.5   PROT 7.8      K 3.7   CO2 23      BUN 11   CREATININE 0.9   ALKPHOS 110   ALT 20   AST 22   BILITOT 0.4     Coagulation: No results for input(s): PT, INR, APTT in the last 48 hours.    Significant Imaging:  Imaging results within the past 24 hours have been reviewed.    Assessment/Plan:     GI  Abnormal abdominal CT scan  Unclear what is going on. The  patient doesn't have any symptoms of colitis despite CT findings. She primarily has urogyn symptoms with abnormal UA. There was questionable abscesses but WBC normal and patient afebrile. Discussed the case with Dr. Bennett and she agrees that imaging with rectal contrast may be beneficial to help delineate the anatomy and rule out fistula.   UC/Crohn's unlikely.   CT pelvis with rectal contrast was ordered.   Agree with Cipro and Flagyl.   Continue with supportive care and prn antiemetics and pain meds.   Keep NPO for now.         Thank you for your consult. I will follow-up with patient. Please contact us if you have any additional questions.    Castro Aggarwal PA-C  Gastroenterology  [facility] - [facility] ([facility])

## 2023-06-09 NOTE — HPI
Ms. Lal is a 53-year-old  female with no significant medical problems, presented to the ED complaining of 4-5 days of lower abdominal discomfort, tenesmus, nausea without vomiting.  Used preparation H suppository with no improvement, but noticed vaginal bleeding, hence presented to the ED for further evaluation.  Afebrile, hemodynamically stable.  Laboratory workup is unremarkable.  H/H 11 0.5/37.0.  UA reveals few WBC clumps, > 100 WBCs, 26 RBCs, positive for occult blood.  CT abdomen pelvis reveals fat stranding and mucosal thickening of the rectosigmoid colon, with two surrounding loculated abscesses 3.0 x 3.5 cm, 2.8 x 2.4 cm with no evidence of acute appendicitis.  Patient received IV Rocephin, metronidazole in the ED.  Patient allergic to penicillins.  Afebrile, hemodynamically stable.  General surgeon, Dr. Bennett was consulted, recommended admission to hospital medicine.

## 2023-06-09 NOTE — ASSESSMENT & PLAN NOTE
-fat stranding of the rectosigmoid colon with surrounding abscesses and possible fistula, raises suspicion of IBD (Crohn's/UC).    -continue IV antibiotics.    -general surgery consult.

## 2023-06-09 NOTE — ED PROVIDER NOTES
"SCRIBE #1 NOTE: I, Leonard Sultana, am scribing for, and in the presence of, Saul Carvajal MD. I have scribed the entire note.       History     Chief Complaint   Patient presents with    Abdominal Pain    Dysuria    Back Pain     Pt states that her symptoms started on Sunday states pain is in LQ in the middle. States she has lower back pain. States she has pain and difficulty urinating. States she took a suppository and when she pooped it was dark and tarry she also saw bright red blood. States stool wasn't hard. States that she is fatigued as well.       HPI  6/8/2023, 9:32 PM  History obtained from the patient    HPI:  Kaylan Lal is a 53 y.o. female with a PMH of partial hysterectomy who presents to the [facility] emergency department for evaluation of generalized abdominal pain which onset   today. Pt notes she used a rectal "suppository" (Preparation H) and at that time noticed vaginal bleeding coming "from where [she] urinates." Associated symptoms include nausea, fatigue, chills, right flank pain, vaginal discharge. Exacerbating factors: None. No other complaints or concerns.     Arrival mode: Personal vehicle    PCP: Jyothi Edwards MD    Review of patient's allergies indicates:   Allergen Reactions    Codeine      Other reaction(s): Unknown    Hydrocodone-acetaminophen     Penicillins       History reviewed. No pertinent past medical history.  Past Surgical History:   Procedure Laterality Date    HYSTERECTOMY      Partial       History reviewed. No pertinent family history.  Social History     Tobacco Use    Smoking status: Never    Smokeless tobacco: Never   Substance and Sexual Activity    Alcohol use: Not Currently    Drug use: Never    Sexual activity: Yes      Review of Systems     Review of Systems   Constitutional:  Positive for chills and fatigue.   HENT: Negative.     Eyes: Negative.    Respiratory: Negative.     Cardiovascular: Negative.    Gastrointestinal:  Positive for " abdominal distention, abdominal pain (generalized) and nausea. Negative for anal bleeding, diarrhea, rectal pain and vomiting.   Endocrine: Negative.    Genitourinary:  Positive for decreased urine volume, flank pain (right), vaginal bleeding and vaginal discharge.   Skin: Negative.    Allergic/Immunologic: Negative.    Neurological: Negative.    Hematological: Negative.    Psychiatric/Behavioral: Negative.          Physical Exam     Initial Vitals [06/08/23 1940]   BP Pulse Resp Temp SpO2   139/81 82 16 98.6 °F (37 °C) 97 %      MAP       --          Physical Exam  Nursing notes and vital signs reviewed.  Constitutional: Patient is in no distress.   Head: Normocephalic. Atraumatic.   Eyes: Conjunctivae are not pale. No scleral icterus.   ENT: Mucous membranes moist.   Neck: Supple.   Cardiovascular: Regular rate. Regular rhythm.   Pulmonary: No respiratory distress.   Abdominal: Non-distended. Mild diffuse abdominal tenderness that is worse in the suprapubic area.  : Right CVA tenderness.  Musculoskeletal: Moves all extremities. No obvious deformities. No edema.   Skin: Warm and dry.   Neurological:  Alert, awake, and appropriate. Normal speech. No acute lateralizing neurologic deficits appreciated.   Psychiatric: Normal affect.       ED Course   Procedures  Vitals:    06/08/23 1940 06/08/23 2118 06/08/23 2223 06/08/23 2304   BP: 139/81 130/87  116/75   Pulse: 82 70  69   Resp: 16 18 20 18   Temp: 98.6 °F (37 °C)      TempSrc: Oral      SpO2: 97% 98%  96%   Weight: 94.7 kg (208 lb 10.7 oz)       06/09/23 0132 06/09/23 0233 06/09/23 0251 06/09/23 0308   BP: 123/67 122/72  130/72   Pulse: 63 60  61   Resp: 18 20 18 14   Temp:  98.7 °F (37.1 °C)  97.8 °F (36.6 °C)   TempSrc:  Oral     SpO2: 96% 97%  96%   Weight:    96 kg (211 lb 10.3 oz)       Lab Results Interpreted as Abnormal:  Labs Reviewed   VAGINAL SCREEN - Abnormal; Notable for the following components:       Result Value    WBC - Vaginal Screen Many (*)      Bacteria - Vaginal Screen Moderate (*)     All other components within normal limits    Narrative:     Release to patient->Immediate   CBC W/ AUTO DIFFERENTIAL - Abnormal; Notable for the following components:    Hemoglobin 11.5 (*)     MCV 80 (*)     MCH 24.9 (*)     MCHC 31.1 (*)     All other components within normal limits   URINALYSIS, REFLEX TO URINE CULTURE - Abnormal; Notable for the following components:    Appearance, UA Hazy (*)     Specific Gravity, UA >1.030 (*)     Protein, UA 1+ (*)     Ketones, UA 2+ (*)     Occult Blood UA 2+ (*)     Urobilinogen, UA 2.0-3.0 (*)     Leukocytes, UA 3+ (*)     All other components within normal limits    Narrative:     Specimen Source->Urine   URINALYSIS MICROSCOPIC - Abnormal; Notable for the following components:    RBC, UA 26 (*)     WBC, UA >100 (*)     WBC Clumps, UA Few (*)     All other components within normal limits    Narrative:     Specimen Source->Urine   CULTURE, URINE   CULTURE, STOOL   COMPREHENSIVE METABOLIC PANEL   OCCULT BLOOD X 1, STOOL   WBC, STOOL   CALPROTECTIN, STOOL        All Lab Results:  Results for orders placed or performed during the hospital encounter of 06/08/23   Vaginal Screen    Specimen: Vagina   Result Value Ref Range    Trichomonas None None    Clue Cells None None    Budding Yeast None None    Fungal Hyphae None None    WBC - Vaginal Screen Many (A) None    Bacteria - Vaginal Screen Moderate (A) None    Wet Prep Source Vagina    CBC auto differential   Result Value Ref Range    WBC 9.23 3.90 - 12.70 K/uL    RBC 4.62 4.00 - 5.40 M/uL    Hemoglobin 11.5 (L) 12.0 - 16.0 g/dL    Hematocrit 37.0 37.0 - 48.5 %    MCV 80 (L) 82 - 98 fL    MCH 24.9 (L) 27.0 - 31.0 pg    MCHC 31.1 (L) 32.0 - 36.0 g/dL    RDW 14.1 11.5 - 14.5 %    Platelets 318 150 - 450 K/uL    MPV 9.7 9.2 - 12.9 fL    Immature Granulocytes 0.2 0.0 - 0.5 %    Gran # (ANC) 5.7 1.8 - 7.7 K/uL    Immature Grans (Abs) 0.02 0.00 - 0.04 K/uL    Lymph # 2.7 1.0 - 4.8 K/uL     Mono # 0.8 0.3 - 1.0 K/uL    Eos # 0.1 0.0 - 0.5 K/uL    Baso # 0.04 0.00 - 0.20 K/uL    nRBC 0 0 /100 WBC    Gran % 61.2 38.0 - 73.0 %    Lymph % 29.3 18.0 - 48.0 %    Mono % 8.2 4.0 - 15.0 %    Eosinophil % 0.7 0.0 - 8.0 %    Basophil % 0.4 0.0 - 1.9 %    Differential Method Automated    Comprehensive metabolic panel   Result Value Ref Range    Sodium 138 136 - 145 mmol/L    Potassium 3.7 3.5 - 5.1 mmol/L    Chloride 104 95 - 110 mmol/L    CO2 23 23 - 29 mmol/L    Glucose 101 70 - 110 mg/dL    BUN 11 6 - 20 mg/dL    Creatinine 0.9 0.5 - 1.4 mg/dL    Calcium 10.0 8.7 - 10.5 mg/dL    Total Protein 7.8 6.0 - 8.4 g/dL    Albumin 3.5 3.5 - 5.2 g/dL    Total Bilirubin 0.4 0.1 - 1.0 mg/dL    Alkaline Phosphatase 110 55 - 135 U/L    AST 22 10 - 40 U/L    ALT 20 10 - 44 U/L    Anion Gap 11 8 - 16 mmol/L    eGFR >60 >60 mL/min/1.73 m^2   Urinalysis, Reflex to Urine Culture Urine, Clean Catch    Specimen: Urine   Result Value Ref Range    Specimen UA Urine, Clean Catch     Color, UA Yellow Yellow, Straw, Cady    Appearance, UA Hazy (A) Clear    pH, UA 6.0 5.0 - 8.0    Specific Gravity, UA >1.030 (A) 1.005 - 1.030    Protein, UA 1+ (A) Negative    Glucose, UA Negative Negative    Ketones, UA 2+ (A) Negative    Bilirubin (UA) Negative Negative    Occult Blood UA 2+ (A) Negative    Nitrite, UA Negative Negative    Urobilinogen, UA 2.0-3.0 (A) <2.0 EU/dL    Leukocytes, UA 3+ (A) Negative   Urinalysis Microscopic   Result Value Ref Range    RBC, UA 26 (H) 0 - 4 /hpf    WBC, UA >100 (H) 0 - 5 /hpf    WBC Clumps, UA Few (A) None-Rare    Bacteria Rare None-Occ /hpf    Squam Epithel, UA 2 /hpf    Hyaline Casts, UA 1 0-1/lpf /lpf    Microscopic Comment SEE COMMENT          Imaging Results              CT Abdomen Pelvis With Contrast (Final result)  Result time 06/08/23 23:16:47      Final result by Ethan Hamilton MD (06/08/23 23:16:47)                   Impression:      No bowel obstruction.  Fat stranding and mucosal thickening of  the rectosigmoid colon.  Perirectal fat stranding leads to question loculated fluid collections which may relate to foci of abscess.  Underlying enteric fistula is not excluded.  The question foci of abscess measures 3.0 by 3.5 and 2.8 by 2.4 and appear multiloculated. The vaginal cuff also appears mildly thickened adjacent to the area of presumed infectious inflammatory change.    All CT scans at this facility use dose modulation, iterative reconstruction, and/or weight based dosing when appropriate to reduce radiation dose to as low as reasonably achievable.      Electronically signed by: Ethan Hamilton  Date:    06/08/2023  Time:    23:16               Narrative:    EXAMINATION:  CT ABDOMEN PELVIS WITH CONTRAST    CLINICAL HISTORY:  LLQ abdominal pain;    TECHNIQUE:  Low dose axial images, sagittal and coronal reformations were obtained from the lung bases to the pubic symphysis following the IV administration of 100 mL of Omnipaque 350.    COMPARISON:  None    FINDINGS:  Heart: Normal size as far as seen. No effusion as far as seen.    Lung Bases: Clear.    Liver: Fatty infiltration of the liver no focal lesions.    Gallbladder: No calcified gallstones.    Bile Ducts: No dilatation.    Pancreas: No mass. No peripancreatic fat stranding.    Spleen: Normal.    Adrenals: Normal.    Kidneys/Ureters: Normal enhancement.  No mass or  hydroureteronephrosis.    Bladder: No wall thickening.    Reproductive organs: Patient is status post hysterectomy.  The vaginal cuff also appears mildly thickened adjacent to the area of described inflammatory infectious change.    GI Tract/Mesentery: No bowel obstruction.  Fat stranding and mucosal thickening of the rectosigmoid colon..  Colonic fat stranding leads to question loculated fluid collections which may relate to foci of abscess.  Underlying enteric fistula is not excluded.  The question foci of abscess measures 3.0 by 3.5 and 2.8 by 2.4 and appear multiloculated.  No evidence  of acute appendicitis.    Peritoneal Space: No ascites or free air.    Retroperitoneum: No significant adenopathy.    Abdominal wall: Normal.    Vasculature: No aneurysm.    Bones: No acute fracture. No suspicious lytic or sclerotic lesions.                                     ED Physician's independent review of the above imaging: agree with radiologist    The emergency physician reviewed the vital signs and test results, which are outlined above.     ED Discussion     9:12 PM: Verified if any penicillin allergy with patient. Pt denies any known penicillin allergies.    12:27 AM: Discussed pt's case with Dr. Bennett (General Surgery) who recommends admission to , NPO with ice chips, IVF, and abx.    1:25 AM: Discussed case with Alexandre Elias NP (Hospital Medicine). Dr. Monahan agrees with current care and management of pt and accepts admission.   Admitting Service: Hospital Medicine  Admitting Physician: Dr. Monahan  Admit to: Med surg    1:25 AM: Re-evaluated pt. I have discussed test results, shared treatment plan, and the need for admission with patient and family at bedside. Pt and family express understanding at this time and agree with all information. All questions answered. Pt and family have no further questions or concerns at this time. Pt is ready for admit.         Medical Decision Making:   Clinical Tests:   Lab Tests: Ordered and Reviewed  Radiological Study: Ordered and Reviewed       ED Medication(s):  Medications   metronidazole IVPB 500 mg (0 mg Intravenous Stopped 6/9/23 0249)   ondansetron injection 4 mg (has no administration in time range)   0.9%  NaCl infusion ( Intravenous New Bag 6/9/23 0312)   hydrALAZINE injection 10 mg (has no administration in time range)   morphine injection 2 mg (has no administration in time range)   cefTRIAXone (ROCEPHIN) 1 g in dextrose 5 % in water (D5W) 5 % 100 mL IVPB (MB+) (0 g Intravenous Stopped 6/8/23 2449)   morphine injection 4 mg (4 mg Intravenous  Given 6/8/23 2223)   ondansetron injection 4 mg (4 mg Intravenous Given 6/8/23 2222)   lactated ringers bolus 1,000 mL (0 mLs Intravenous Stopped 6/9/23 0155)   iohexoL (OMNIPAQUE 350) injection 100 mL (100 mLs Intravenous Given 6/8/23 2248)     Current Discharge Medication List        Prescription Management: I performed a review of the patient's current Rx medication list as input by nursing staff.        Scribe Attestation:   Scribe #1: I performed the above scribed service and the documentation accurately describes the services I performed. I attest to the accuracy of the note.     Attending:   Physician Attestation Statement for Scribe #1: I, Saul Carvajal MD, personally performed the services described in this documentation, as scribed by Leonard Weinberg, in my presence, and it is both accurate and complete. As with other dictation methods such as dictation software, small errors or inconsistencies may be overlooked due to the goal of spending more face-to-face time with patients.      Clinical Impression       ICD-10-CM ICD-9-CM   1. Colitis  K52.9 558.9   2. Pyuria  R82.81 791.9   3. Abscess of sigmoid colon  K63.0 569.5      ED Disposition Condition    Observation Stable               Saul Carvajal MD  06/09/23 8623

## 2023-06-09 NOTE — HPI
"The patient presented to the ER for hematuria. She described suprapubic pressure radiating into her vagina which started about 6 days ago. The pain has been intermittent. BC Powder helped the pain. Nothing else increased or decreased the pain. She had chills the first two days but didn't take her temperature. She reports decreased fatigue and decreased appetite. She thought the pressure may have been from constipation and took a suppository. She otherwise denies vomiting, diarrhea, hematochezia or melena. She has never had a colonoscopy. She denies a family history of colon cancer or IBD. She had a hysterectomy in 2007 and sees her doctor as prescribed. ER work up revealed a Hgb of 11.5 but otherwise normal CBC and CMP. UA revealed 26 RBCs, >100 WBCs, nitrite negative, bacteria rare. Vaginal screen showed moderate bacteria and many WBCs. CT scan showed fat stranding and mucosal thickening of the rectosigmoid colon.  Perirectal fat stranding leads to question loculated fluid collections which may relate to foci of abscess.  Underlying enteric fistula is not excluded.  The question foci of abscess measures 3.0 by 3.5 and 2.8 by 2.4 and appear multiloculated. The vaginal cuff also appears mildly thickened adjacent to the area of presumed infectious inflammatory change. The patient was started on Cipro and Flagyl. IR was consulted for abscess drainage but were unable "Due to position high in the pelvis, multiple small loculations, and proximity to surrounding structures (bowel, bladder), these pelvic abscesses are not drainable." General Surgery was also consulted.   "

## 2023-06-09 NOTE — SUBJECTIVE & OBJECTIVE
No current facility-administered medications on file prior to encounter.     Current Outpatient Medications on File Prior to Encounter   Medication Sig    amitriptyline (ELAVIL) 25 MG tablet Take 1 tablet (25 mg total) by mouth every evening.    aspirin (ECOTRIN) 81 MG EC tablet Take 81 mg by mouth.    azelastine (ASTELIN) 137 mcg (0.1 %) nasal spray SMARTSIG:Both Nares    cyclobenzaprine (FLEXERIL) 5 MG tablet Take 5 mg by mouth 3 (three) times daily as needed.    dexbrompheniramine-phenyleph (ALAHIST PE) 2-7.5 mg Tab 1 tablet    DULoxetine (CYMBALTA) 30 MG capsule One tab at night for 3days then increase  to one tab po bid    LIDOcaine (LIDODERM) 5 %     meclizine (ANTIVERT) 25 mg tablet One tab po q 6hours prn dizziness    montelukast (SINGULAIR) 10 mg tablet Take 10 mg by mouth once daily.    naproxen (NAPROSYN) 500 MG tablet Take 1 tablet (500 mg total) by mouth 2 (two) times daily with meals. (Patient not taking: No sig reported)    pregabalin (LYRICA) 75 MG capsule Take 1 capsule, 75 mg, once daily for 1 week.  Followed by take 1 capsule 75 mg twice daily for 1 week.  Followed by 2 capsules, 150 mg in the morning and 75 mg at night for 1 week.  Followed by 150 mg twice daily for 1 week.  Followed by 225 mg in the morning and 150 mg in the evening for 1 week.  Followed by 225 mg b.i.d.. (Patient not taking: Reported on 9/20/2022)       Review of patient's allergies indicates:   Allergen Reactions    Codeine      Other reaction(s): Unknown    Hydrocodone-acetaminophen     Penicillins        History reviewed. No pertinent past medical history.  Past Surgical History:   Procedure Laterality Date    HYSTERECTOMY      Partial     Family History    None       Tobacco Use    Smoking status: Never    Smokeless tobacco: Never   Substance and Sexual Activity    Alcohol use: Not Currently    Drug use: Never    Sexual activity: Yes     Review of Systems   Constitutional:  Positive for chills, fatigue and fever. Negative  for unexpected weight change.   Respiratory:  Negative for cough, shortness of breath, wheezing and stridor.    Cardiovascular:  Negative for chest pain, palpitations and leg swelling.   Gastrointestinal:  Positive for abdominal pain, blood in stool and nausea. Negative for abdominal distention, constipation, diarrhea and vomiting.   Genitourinary:  Positive for hematuria. Negative for difficulty urinating, dysuria, frequency and urgency.   Skin:  Negative for color change, pallor, rash and wound.   Hematological:  Does not bruise/bleed easily.   Objective:     Vital Signs (Most Recent):  Temp: 98.9 °F (37.2 °C) (06/09/23 0730)  Pulse: 60 (06/09/23 0730)  Resp: 16 (06/09/23 0730)  BP: (!) 102/59 (06/09/23 0730)  SpO2: (!) 93 % (06/09/23 0730) Vital Signs (24h Range):  Temp:  [97.8 °F (36.6 °C)-98.9 °F (37.2 °C)] 98.9 °F (37.2 °C)  Pulse:  [60-82] 60  Resp:  [14-20] 16  SpO2:  [93 %-98 %] 93 %  BP: (102-139)/(59-87) 102/59     Weight: 96 kg (211 lb 10.3 oz)  Body mass index is 32.18 kg/m².     Physical Exam  Vitals and nursing note reviewed.   Constitutional:       General: She is not in acute distress.     Appearance: She is well-developed. She is not ill-appearing, toxic-appearing or diaphoretic.   HENT:      Head: Normocephalic and atraumatic.      Right Ear: External ear normal.      Left Ear: External ear normal.      Nose: Nose normal.      Mouth/Throat:      Mouth: Mucous membranes are moist.   Eyes:      Extraocular Movements: Extraocular movements intact.      Conjunctiva/sclera: Conjunctivae normal.   Cardiovascular:      Rate and Rhythm: Normal rate.   Pulmonary:      Effort: Pulmonary effort is normal. No respiratory distress.   Abdominal:      Comments: Abdomen is soft and nondistended with left lower quadrant and pelvic tenderness with moderate palpation   Musculoskeletal:      Cervical back: Neck supple.   Skin:     General: Skin is warm and dry.   Neurological:      Mental Status: She is alert and  oriented to person, place, and time.   Psychiatric:         Behavior: Behavior normal.          I have reviewed all pertinent lab results within the past 24 hours.  CBC:   Recent Labs   Lab 06/08/23 1953   WBC 9.23   RBC 4.62   HGB 11.5*   HCT 37.0      MCV 80*   MCH 24.9*   MCHC 31.1*     CMP:   Recent Labs   Lab 06/08/23 1953      CALCIUM 10.0   ALBUMIN 3.5   PROT 7.8      K 3.7   CO2 23      BUN 11   CREATININE 0.9   ALKPHOS 110   ALT 20   AST 22   BILITOT 0.4       Significant Diagnostics:  I have reviewed all pertinent imaging results/findings within the past 24 hours.  CT performed ED reviewed as per HPI.  CT with rectal contrast versus contrast via bladder ordered and pending

## 2023-06-09 NOTE — SUBJECTIVE & OBJECTIVE
History reviewed. No pertinent past medical history.    Past Surgical History:   Procedure Laterality Date    HYSTERECTOMY      Partial       Review of patient's allergies indicates:   Allergen Reactions    Codeine      Other reaction(s): Unknown    Hydrocodone-acetaminophen     Penicillins      Family History    None       Tobacco Use    Smoking status: Never    Smokeless tobacco: Never   Substance and Sexual Activity    Alcohol use: Not Currently    Drug use: Never    Sexual activity: Yes     Review of Systems   Constitutional:  Positive for fatigue. Negative for fever.   HENT:  Negative for hearing loss.    Eyes:  Negative for visual disturbance.   Respiratory:  Negative for cough and shortness of breath.    Cardiovascular:  Negative for chest pain.   Gastrointestinal:         As per HPI.   Genitourinary:  Positive for hematuria. Negative for dysuria and frequency.   Musculoskeletal:  Negative for arthralgias and back pain.   Skin:  Negative for rash.   Neurological:  Positive for dizziness. Negative for seizures, syncope, numbness and headaches.   Hematological:  Does not bruise/bleed easily.   Psychiatric/Behavioral:  The patient is not nervous/anxious.    Objective:     Vital Signs (Most Recent):  Temp: 98.9 °F (37.2 °C) (06/09/23 0730)  Pulse: 60 (06/09/23 0730)  Resp: 16 (06/09/23 0730)  BP: (!) 102/59 (06/09/23 0730)  SpO2: (!) 93 % (06/09/23 0730) Vital Signs (24h Range):  Temp:  [97.8 °F (36.6 °C)-98.9 °F (37.2 °C)] 98.9 °F (37.2 °C)  Pulse:  [60-82] 60  Resp:  [14-20] 16  SpO2:  [93 %-98 %] 93 %  BP: (102-139)/(59-87) 102/59     Weight: 96 kg (211 lb 10.3 oz) (06/09/23 0353)  Body mass index is 32.18 kg/m².      Intake/Output Summary (Last 24 hours) at 6/9/2023 0910  Last data filed at 6/9/2023 0627  Gross per 24 hour   Intake 672.37 ml   Output --   Net 672.37 ml       Lines/Drains/Airways       Peripheral Intravenous Line  Duration                  Peripheral IV - Single Lumen 06/08/23 2220 20 G Right  Antecubital <1 day                     Physical Exam  Constitutional:       General: She is not in acute distress.     Appearance: Normal appearance. She is well-developed.   HENT:      Head: Normocephalic and atraumatic.   Eyes:      Extraocular Movements: Extraocular movements intact.   Cardiovascular:      Rate and Rhythm: Normal rate and regular rhythm.      Heart sounds: No murmur heard.  Pulmonary:      Effort: Pulmonary effort is normal. No respiratory distress.      Breath sounds: Normal breath sounds. No wheezing.   Abdominal:      General: Bowel sounds are normal. There is no distension.      Palpations: Abdomen is soft. There is no mass.      Tenderness: There is abdominal tenderness in the suprapubic area. There is no guarding or rebound.   Musculoskeletal:      Cervical back: Normal range of motion and neck supple.      Right lower leg: No edema.      Left lower leg: No edema.   Skin:     General: Skin is warm and dry.      Findings: No rash.   Neurological:      Mental Status: She is alert and oriented to person, place, and time.      Cranial Nerves: No cranial nerve deficit.   Psychiatric:         Behavior: Behavior normal.        Significant Labs:  CBC:   Recent Labs   Lab 06/08/23 1953   WBC 9.23   HGB 11.5*   HCT 37.0        CMP:   Recent Labs   Lab 06/08/23 1953      CALCIUM 10.0   ALBUMIN 3.5   PROT 7.8      K 3.7   CO2 23      BUN 11   CREATININE 0.9   ALKPHOS 110   ALT 20   AST 22   BILITOT 0.4     Coagulation: No results for input(s): PT, INR, APTT in the last 48 hours.    Significant Imaging:  Imaging results within the past 24 hours have been reviewed.

## 2023-06-09 NOTE — CARE UPDATE
Patient admitted with coloproctitis with associated abscesses that are not able to be drained by IR.  GI and colorectal surgery has been consulted.  They recommended continuing IV antibiotics with repeat CT scan with rectal contrast tomorrow to evaluate for leak.  Patient is stable.

## 2023-06-09 NOTE — FIRST PROVIDER EVALUATION
Medical screening examination initiated.  I have conducted a focused provider triage encounter, findings are as follows:    Brief history of present illness:  Patient complains of suprapubic discomfort pressure pain coming from her vagina    Vitals:    06/08/23 1940   BP: 139/81   BP Location: Right arm   Patient Position: Sitting   Pulse: 82   Resp: 16   Temp: 98.6 °F (37 °C)   TempSrc: Oral   SpO2: 97%   Weight: 94.7 kg (208 lb 10.7 oz)       Pertinent physical exam:  NAD    Brief workup plan:  Labs urinalysis    Preliminary workup initiated; this workup will be continued and followed by the physician or advanced practice provider that is assigned to the patient when roomed.

## 2023-06-09 NOTE — H&P
[facility] Medicine  History & Physical    Patient Name: Kaylan Lal  MRN: [id number]  Patient Class: OP- Observation  Admission Date: 6/8/2023  Attending Physician: Cameron Monahan MD   Primary Care Provider: Jyothi Edwards MD         Patient information was obtained from patient, past medical records and ER records.     Subjective:     Principal Problem:Intra-abdominal abscess    Chief Complaint:   Chief Complaint   Patient presents with    Abdominal Pain    Dysuria    Back Pain     Pt states that her symptoms started on Sunday states pain is in LQ in the middle. States she has lower back pain. States she has pain and difficulty urinating. States she took a suppository and when she pooped it was dark and tarry she also saw bright red blood. States stool wasn't hard. States that she is fatigued as well.          HPI: Ms. Lal is a 53-year-old  female with no significant medical problems, presented to the ED complaining of 4-5 days of lower abdominal discomfort, tenesmus, nausea without vomiting.  Used preparation H suppository with no improvement, but noticed vaginal bleeding, hence presented to the ED for further evaluation.  Afebrile, hemodynamically stable.  Laboratory workup is unremarkable.  H/H 11 0.5/37.0.  UA reveals few WBC clumps, > 100 WBCs, 26 RBCs, positive for occult blood.  CT abdomen pelvis reveals fat stranding and mucosal thickening of the rectosigmoid colon, with two surrounding loculated abscesses 3.0 x 3.5 cm, 2.8 x 2.4 cm with no evidence of acute appendicitis.  Patient received IV Rocephin, metronidazole in the ED.  Patient allergic to penicillins.  Afebrile, hemodynamically stable.  General surgeon, Dr. Bennett was consulted, recommended admission to hospital medicine.      History reviewed. No pertinent past medical history.    Past Surgical History:   Procedure Laterality Date    HYSTERECTOMY      Partial       Review of patient's  allergies indicates:   Allergen Reactions    Codeine      Other reaction(s): Unknown    Hydrocodone-acetaminophen     Penicillins        No current facility-administered medications on file prior to encounter.     Current Outpatient Medications on File Prior to Encounter   Medication Sig    amitriptyline (ELAVIL) 25 MG tablet Take 1 tablet (25 mg total) by mouth every evening.    aspirin (ECOTRIN) 81 MG EC tablet Take 81 mg by mouth.    azelastine (ASTELIN) 137 mcg (0.1 %) nasal spray SMARTSIG:Both Nares    cyclobenzaprine (FLEXERIL) 5 MG tablet Take 5 mg by mouth 3 (three) times daily as needed.    dexbrompheniramine-phenyleph (ALAHIST PE) 2-7.5 mg Tab 1 tablet    DULoxetine (CYMBALTA) 30 MG capsule One tab at night for 3days then increase  to one tab po bid    LIDOcaine (LIDODERM) 5 %     meclizine (ANTIVERT) 25 mg tablet One tab po q 6hours prn dizziness    montelukast (SINGULAIR) 10 mg tablet Take 10 mg by mouth once daily.    naproxen (NAPROSYN) 500 MG tablet Take 1 tablet (500 mg total) by mouth 2 (two) times daily with meals. (Patient not taking: No sig reported)    pregabalin (LYRICA) 75 MG capsule Take 1 capsule, 75 mg, once daily for 1 week.  Followed by take 1 capsule 75 mg twice daily for 1 week.  Followed by 2 capsules, 150 mg in the morning and 75 mg at night for 1 week.  Followed by 150 mg twice daily for 1 week.  Followed by 225 mg in the morning and 150 mg in the evening for 1 week.  Followed by 225 mg b.i.d.. (Patient not taking: Reported on 9/20/2022)     Family History    Reviewed and Not Pertinent       Tobacco Use    Smoking status: Never    Smokeless tobacco: Never   Substance and Sexual Activity    Alcohol use: Not Currently    Drug use: Never    Sexual activity: Yes     Review of Systems   Constitutional: Negative.  Negative for chills and fever.   HENT: Negative.  Negative for congestion, rhinorrhea, sore throat and trouble swallowing.    Eyes: Negative.  Negative for  visual disturbance.   Respiratory: Negative.  Negative for cough, shortness of breath and wheezing.    Cardiovascular: Negative.  Negative for chest pain and palpitations.   Gastrointestinal:  Positive for abdominal pain and nausea. Negative for diarrhea and vomiting.   Endocrine: Negative.    Genitourinary: Negative.  Negative for dysuria and flank pain.   Musculoskeletal: Negative.  Negative for back pain.   Skin: Negative.  Negative for rash.   Allergic/Immunologic: Negative.    Neurological: Negative.  Negative for speech difficulty, weakness, numbness and headaches.   Hematological: Negative.    Psychiatric/Behavioral: Negative.  Negative for hallucinations. The patient is not nervous/anxious.    All other systems reviewed and are negative.  Objective:     Vital Signs (Most Recent):  Temp: 97.8 °F (36.6 °C) (06/09/23 0353)  Pulse: 61 (06/09/23 0353)  Resp: 14 (06/09/23 0353)  BP: 130/72 (06/09/23 0353)  SpO2: 96 % (06/09/23 0353) Vital Signs (24h Range):  Temp:  [97.8 °F (36.6 °C)-98.7 °F (37.1 °C)] 97.8 °F (36.6 °C)  Pulse:  [60-82] 61  Resp:  [14-20] 14  SpO2:  [96 %-98 %] 96 %  BP: (116-139)/(67-87) 130/72     Weight: 96 kg (211 lb 10.3 oz)  Body mass index is 32.18 kg/m².     Physical Exam  Vitals and nursing note reviewed.   Constitutional:       General: She is awake. She is not in acute distress.     Appearance: She is not ill-appearing.   HENT:      Head: Normocephalic and atraumatic.      Mouth/Throat:      Mouth: Mucous membranes are moist.   Eyes:      General: No scleral icterus.     Conjunctiva/sclera: Conjunctivae normal.   Cardiovascular:      Rate and Rhythm: Normal rate and regular rhythm.      Heart sounds: No murmur heard.  Pulmonary:      Effort: Pulmonary effort is normal. No respiratory distress.      Breath sounds: Normal breath sounds. No wheezing.   Abdominal:      Palpations: Abdomen is soft.      Tenderness: There is abdominal tenderness (Generalized, but more so in the lower abdomen.   No guarding, no rigidity.).   Musculoskeletal:         General: No swelling. Normal range of motion.      Cervical back: Normal range of motion and neck supple.   Skin:     General: Skin is warm.      Coloration: Skin is not jaundiced.   Neurological:      General: No focal deficit present.      Mental Status: She is alert and oriented to person, place, and time. Mental status is at baseline.   Psychiatric:         Attention and Perception: Attention normal.         Mood and Affect: Mood normal.         Speech: Speech normal.         Behavior: Behavior normal. Behavior is cooperative.              Significant Labs: All pertinent labs within the past 24 hours have been reviewed.  CBC:   Recent Labs   Lab 06/08/23 1953   WBC 9.23   HGB 11.5*   HCT 37.0        CMP:   Recent Labs   Lab 06/08/23 1953      K 3.7      CO2 23      BUN 11   CREATININE 0.9   CALCIUM 10.0   PROT 7.8   ALBUMIN 3.5   BILITOT 0.4   ALKPHOS 110   AST 22   ALT 20   ANIONGAP 11     Urine Studies:   Recent Labs   Lab 06/08/23 1949   COLORU Yellow   APPEARANCEUA Hazy*   PHUR 6.0   SPECGRAV >1.030*   PROTEINUA 1+*   GLUCUA Negative   KETONESU 2+*   BILIRUBINUA Negative   OCCULTUA 2+*   NITRITE Negative   UROBILINOGEN 2.0-3.0*   LEUKOCYTESUR 3+*   RBCUA 26*   WBCUA >100*   BACTERIA Rare   SQUAMEPITHEL 2   HYALINECASTS 1       Significant Imaging: I have reviewed all pertinent imaging results/findings within the past 24 hours.  I have reviewed and interpreted all pertinent imaging results/findings within the past 24 hours.    Imaging Results              CT Abdomen Pelvis With Contrast (Final result)  Result time 06/08/23 23:16:47      Final result by Ethan Hamilton MD (06/08/23 23:16:47)                   Impression:      No bowel obstruction.  Fat stranding and mucosal thickening of the rectosigmoid colon.  Perirectal fat stranding leads to question loculated fluid collections which may relate to foci of abscess.   Underlying enteric fistula is not excluded.  The question foci of abscess measures 3.0 by 3.5 and 2.8 by 2.4 and appear multiloculated. The vaginal cuff also appears mildly thickened adjacent to the area of presumed infectious inflammatory change.    All CT scans at this facility use dose modulation, iterative reconstruction, and/or weight based dosing when appropriate to reduce radiation dose to as low as reasonably achievable.      Electronically signed by: Ethan Hamilton  Date:    06/08/2023  Time:    23:16               Narrative:    EXAMINATION:  CT ABDOMEN PELVIS WITH CONTRAST    CLINICAL HISTORY:  LLQ abdominal pain;    TECHNIQUE:  Low dose axial images, sagittal and coronal reformations were obtained from the lung bases to the pubic symphysis following the IV administration of 100 mL of Omnipaque 350.    COMPARISON:  None    FINDINGS:  Heart: Normal size as far as seen. No effusion as far as seen.    Lung Bases: Clear.    Liver: Fatty infiltration of the liver no focal lesions.    Gallbladder: No calcified gallstones.    Bile Ducts: No dilatation.    Pancreas: No mass. No peripancreatic fat stranding.    Spleen: Normal.    Adrenals: Normal.    Kidneys/Ureters: Normal enhancement.  No mass or  hydroureteronephrosis.    Bladder: No wall thickening.    Reproductive organs: Patient is status post hysterectomy.  The vaginal cuff also appears mildly thickened adjacent to the area of described inflammatory infectious change.    GI Tract/Mesentery: No bowel obstruction.  Fat stranding and mucosal thickening of the rectosigmoid colon..  Colonic fat stranding leads to question loculated fluid collections which may relate to foci of abscess.  Underlying enteric fistula is not excluded.  The question foci of abscess measures 3.0 by 3.5 and 2.8 by 2.4 and appear multiloculated.  No evidence of acute appendicitis.    Peritoneal Space: No ascites or free air.    Retroperitoneum: No significant adenopathy.    Abdominal wall:  Normal.    Vasculature: No aneurysm.    Bones: No acute fracture. No suspicious lytic or sclerotic lesions.                                      I have independently reviewed all pertinent labs within the past 24 hours.    I have independently reviewed, visualized and interpreted all pertinent imaging results within the past 24 hours and discussed the findings with the ED physician, Dr. Carvajal          Assessment/Plan:     * Intra-abdominal abscess  -two loculated abscesses around the rectosigmoid colon.    -3.0 x 3.5 cm, 2.8 x 2.4 cm.    -continue IV ciprofloxacin, IV metronidazole.    -general surgery consulted.    -keep NPO.    -IV fluids.      Colitis  -fat stranding of the rectosigmoid colon with surrounding abscesses and possible fistula, raises suspicion of IBD (Crohn's/UC).    -continue IV antibiotics.    -general surgery consult.      VTE Risk Mitigation (From admission, onward)         Ordered     Place sequential compression device  Until discontinued         06/09/23 0141                   On 06/09/2023, patient should be placed in hospital observation services under my care.        Cameron Monahan MD  Department of Hospital Medicine  [facility] - Telemetry ([facility])

## 2023-06-09 NOTE — ASSESSMENT & PLAN NOTE
Unclear what is going on. The patient doesn't have any symptoms of colitis despite CT findings. She primarily has urogyn symptoms with abnormal UA. There was questionable abscesses but WBC normal and patient afebrile. Discussed the case with Dr. Bennett and she agrees that imaging with rectal contrast may be beneficial to help delineate the anatomy and rule out fistula.   CT pelvis with rectal contrast was ordered.   Agree with Cipro and Flagyl.   Continue with supportive care and prn antiemetics and pain meds.   Keep NPO for now.

## 2023-06-09 NOTE — PLAN OF CARE
[facility] - Telemetry (Hospital)  Initial Discharge Assessment       Primary Care Provider: Jyothi Edwards MD    Admission Diagnosis: Colitis [K52.9]  Pyuria [R82.81]  Abscess of sigmoid colon [K63.0]    Admission Date: 6/8/2023  Expected Discharge Date:     Transition of Care Barriers: None    Payor: [employer] BLUE [employer] / Plan: [employer] [place] HMO / Product Type: HMO /     Extended Emergency Contact Information  Primary Emergency Contact: DavidLossloane   [facility]  Home Phone: [phone]  Mobile Phone: [phone]  Relation: Daughter    Discharge Plan A: Home with family         [facility] [place] [facility] - [address]  [address]  [place] [address]  Phone: [phone] Fax: [phone]      Initial Assessment (most recent)       Adult Discharge Assessment - 06/09/23 1639          Discharge Assessment    Assessment Type Discharge Planning Assessment     Confirmed/corrected address, phone number and insurance Yes     Confirmed Demographics Correct on Facesheet     Source of Information patient;family     When was your last doctors appointment? 09/22/22     Communicated EMELINA with patient/caregiver Date not available/Unable to determine     Reason For Admission Intrabdominal abcess     People in Home child(coby), adult     Facility Arrived From: home     Do you expect to return to your current living situation? Yes     Do you have help at home or someone to help you manage your care at home? Yes     Who are your caregiver(s) and their phone number(s)? Los silva     Prior to hospitilization cognitive status: Alert/Oriented     Current cognitive status: Alert/Oriented     Home Layout Able to live on 1st floor     Equipment Currently Used at Home none     Readmission within 30 days? No     Patient currently being followed by outpatient case management? No     Do you currently have service(s) that help you manage your care at home? No     Do you take  prescription medications? Yes     Do you have prescription coverage? Yes     Coverage BCBS/medicaid     Do you have any problems affording any of your prescribed medications? No     Is the patient taking medications as prescribed? yes     Who is going to help you get home at discharge? son     How do you get to doctors appointments? family or friend will provide     Are you on dialysis? No     Do you take coumadin? No     Discharge Plan A Home with family     DME Needed Upon Discharge  none     Discharge Plan discussed with: Patient;Adult children     Transition of Care Barriers None                   Met with patient and many family members.  Patient lives with her adult son, Los who can be her help at home.  He assists with her transportation to her physician appointments.  She is independent with ADL's.  No discharge needs currently.

## 2023-06-09 NOTE — CONSULTS
[facility] ([facility])  General Surgery  Consult Note    Patient Name: Kaylan Lal  MRN: [id number]  Code Status: Full Code  Admission Date: 6/8/2023  Hospital Length of Stay: 0 days  Attending Physician: Smita Sandoval MD  Primary Care Provider: Jyothi Edwards MD    Patient information was obtained from patient, past medical records and ER records.     Consults  Subjective:     Principal Problem: Intra-abdominal abscess    History of Present Illness: 53-year-old female who presented to the ED for evaluation of lower abdominal/pelvic pain onset Sunday and worsening since then.  She has associated nausea but no vomiting.  She reports subjective fever and chills at home.  She has had some hematuria as well as hematochezia as well.  She reports a history of hemorrhoids.  She is never experienced anything like this before.  Her past abdominal surgical history is significant for laparoscopic tubal ligation.  She is not a smoker.  CT performed in the ED concerning for pelvic abscess possibly arising from the rectosigmoid junction.  Patient also with UA concerning for UTI, which patient states she does not frequently get.  White count within normal limits and patient is afebrile.      No current facility-administered medications on file prior to encounter.     Current Outpatient Medications on File Prior to Encounter   Medication Sig    amitriptyline (ELAVIL) 25 MG tablet Take 1 tablet (25 mg total) by mouth every evening.    aspirin (ECOTRIN) 81 MG EC tablet Take 81 mg by mouth.    azelastine (ASTELIN) 137 mcg (0.1 %) nasal spray SMARTSIG:Both Nares    cyclobenzaprine (FLEXERIL) 5 MG tablet Take 5 mg by mouth 3 (three) times daily as needed.    dexbrompheniramine-phenyleph (ALAHIST PE) 2-7.5 mg Tab 1 tablet    DULoxetine (CYMBALTA) 30 MG capsule One tab at night for 3days then increase  to one tab po bid    LIDOcaine (LIDODERM) 5 %     meclizine (ANTIVERT) 25 mg tablet One tab po q 6hours prn  dizziness    montelukast (SINGULAIR) 10 mg tablet Take 10 mg by mouth once daily.    naproxen (NAPROSYN) 500 MG tablet Take 1 tablet (500 mg total) by mouth 2 (two) times daily with meals. (Patient not taking: No sig reported)    pregabalin (LYRICA) 75 MG capsule Take 1 capsule, 75 mg, once daily for 1 week.  Followed by take 1 capsule 75 mg twice daily for 1 week.  Followed by 2 capsules, 150 mg in the morning and 75 mg at night for 1 week.  Followed by 150 mg twice daily for 1 week.  Followed by 225 mg in the morning and 150 mg in the evening for 1 week.  Followed by 225 mg b.i.d.. (Patient not taking: Reported on 9/20/2022)       Review of patient's allergies indicates:   Allergen Reactions    Codeine      Other reaction(s): Unknown    Hydrocodone-acetaminophen     Penicillins        History reviewed. No pertinent past medical history.  Past Surgical History:   Procedure Laterality Date    HYSTERECTOMY      Partial     Family History    None       Tobacco Use    Smoking status: Never    Smokeless tobacco: Never   Substance and Sexual Activity    Alcohol use: Not Currently    Drug use: Never    Sexual activity: Yes     Review of Systems   Constitutional:  Positive for chills, fatigue and fever. Negative for unexpected weight change.   Respiratory:  Negative for cough, shortness of breath, wheezing and stridor.    Cardiovascular:  Negative for chest pain, palpitations and leg swelling.   Gastrointestinal:  Positive for abdominal pain, blood in stool and nausea. Negative for abdominal distention, constipation, diarrhea and vomiting.   Genitourinary:  Positive for hematuria. Negative for difficulty urinating, dysuria, frequency and urgency.   Skin:  Negative for color change, pallor, rash and wound.   Hematological:  Does not bruise/bleed easily.   Objective:     Vital Signs (Most Recent):  Temp: 98.9 °F (37.2 °C) (06/09/23 0730)  Pulse: 60 (06/09/23 0730)  Resp: 16 (06/09/23 0730)  BP: (!) 102/59  (06/09/23 0730)  SpO2: (!) 93 % (06/09/23 0730) Vital Signs (24h Range):  Temp:  [97.8 °F (36.6 °C)-98.9 °F (37.2 °C)] 98.9 °F (37.2 °C)  Pulse:  [60-82] 60  Resp:  [14-20] 16  SpO2:  [93 %-98 %] 93 %  BP: (102-139)/(59-87) 102/59     Weight: 96 kg (211 lb 10.3 oz)  Body mass index is 32.18 kg/m².     Physical Exam  Vitals and nursing note reviewed.   Constitutional:       General: She is not in acute distress.     Appearance: She is well-developed. She is not ill-appearing, toxic-appearing or diaphoretic.   HENT:      Head: Normocephalic and atraumatic.      Right Ear: External ear normal.      Left Ear: External ear normal.      Nose: Nose normal.      Mouth/Throat:      Mouth: Mucous membranes are moist.   Eyes:      Extraocular Movements: Extraocular movements intact.      Conjunctiva/sclera: Conjunctivae normal.   Cardiovascular:      Rate and Rhythm: Normal rate.   Pulmonary:      Effort: Pulmonary effort is normal. No respiratory distress.   Abdominal:      Comments: Abdomen is soft and nondistended with left lower quadrant and pelvic tenderness with moderate palpation   Musculoskeletal:      Cervical back: Neck supple.   Skin:     General: Skin is warm and dry.   Neurological:      Mental Status: She is alert and oriented to person, place, and time.   Psychiatric:         Behavior: Behavior normal.          I have reviewed all pertinent lab results within the past 24 hours.  CBC:   Recent Labs   Lab 06/08/23 1953   WBC 9.23   RBC 4.62   HGB 11.5*   HCT 37.0      MCV 80*   MCH 24.9*   MCHC 31.1*     CMP:   Recent Labs   Lab 06/08/23 1953      CALCIUM 10.0   ALBUMIN 3.5   PROT 7.8      K 3.7   CO2 23      BUN 11   CREATININE 0.9   ALKPHOS 110   ALT 20   AST 22   BILITOT 0.4       Significant Diagnostics:  I have reviewed all pertinent imaging results/findings within the past 24 hours.  CT performed ED reviewed as per HPI.  CT with rectal contrast versus contrast via bladder ordered  and pending      Assessment/Plan:     * Intra-abdominal abscess  Per IR, cannot be percutaneously drained. Concern for diverticulitis versus other etiology    - NPO, bowel rest  - IV antibiotics  - CT with rectal contrast versus CT cystogram to evaluate for fistula given proximity to bladder and urinary symptoms.  - Discussed possible need for surgery this admission if patient worsens or fails to improve.  Otherwise, we will manage conservatively and follow up outpatient to discuss colonoscopy and elective resection.        VTE Risk Mitigation (From admission, onward)         Ordered     Place sequential compression device  Until discontinued         06/09/23 0141                Thank you for your consult. I will follow-up with patient. Please contact us if you have any additional questions.    Lizeth Castelan PA-C  General Surgery  [facility] ([facility])

## 2023-06-09 NOTE — HPI
53-year-old female who presented to the ED for evaluation of lower abdominal/pelvic pain onset Sunday and worsening since then.  She has associated nausea but no vomiting.  She reports subjective fever and chills at home.  She has had some hematuria as well as hematochezia as well.  She reports a history of hemorrhoids.  She is never experienced anything like this before.  Her past abdominal surgical history is significant for laparoscopic tubal ligation.  She is not a smoker.  CT performed in the ED concerning for pelvic abscess possibly arising from the rectosigmoid junction.  Patient also with UA concerning for UTI, which patient states she does not frequently get.  White count within normal limits and patient is afebrile.

## 2023-06-09 NOTE — ASSESSMENT & PLAN NOTE
Per IR, cannot be percutaneously drained. Concern for diverticulitis versus other etiology    - NPO, bowel rest  - IV antibiotics  - CT with rectal contrast versus CT cystogram to evaluate for fistula given proximity to bladder and urinary symptoms.  - Discussed possible need for surgery this admission if patient worsens or fails to improve.  Otherwise, we will manage conservatively and follow up outpatient to discuss colonoscopy and elective resection.

## 2023-06-09 NOTE — CONSULTS
Chart reviewed by Dr. Gonzalez.       ASSESSMENT/PLAN:    Pelvic abscesses    Radiologist reports due to position high in the pelvis, multiple small loculations, and proximity to surrounding structures (bowel, bladder), these pelvic abscesses are not drainable.        Thank you for the consult.

## 2023-06-10 DIAGNOSIS — D50.8 OTHER IRON DEFICIENCY ANEMIA: ICD-10-CM

## 2023-06-10 DIAGNOSIS — R93.89 ABNORMAL CT SCAN: Primary | ICD-10-CM

## 2023-06-10 PROBLEM — D50.9 IRON DEFICIENCY ANEMIA: Status: ACTIVE | Noted: 2023-06-10

## 2023-06-10 LAB
ALBUMIN SERPL BCP-MCNC: 2.8 G/DL (ref 3.5–5.2)
ALP SERPL-CCNC: 100 U/L (ref 55–135)
ALT SERPL W/O P-5'-P-CCNC: 19 U/L (ref 10–44)
ANION GAP SERPL CALC-SCNC: 8 MMOL/L (ref 8–16)
AST SERPL-CCNC: 19 U/L (ref 10–40)
BACTERIA UR CULT: NORMAL
BACTERIA UR CULT: NORMAL
BASOPHILS # BLD AUTO: 0.04 K/UL (ref 0–0.2)
BASOPHILS NFR BLD: 0.6 % (ref 0–1.9)
BILIRUB SERPL-MCNC: 0.2 MG/DL (ref 0.1–1)
BUN SERPL-MCNC: 7 MG/DL (ref 6–20)
CALCIUM SERPL-MCNC: 9.4 MG/DL (ref 8.7–10.5)
CHLORIDE SERPL-SCNC: 109 MMOL/L (ref 95–110)
CO2 SERPL-SCNC: 24 MMOL/L (ref 23–29)
CREAT SERPL-MCNC: 0.8 MG/DL (ref 0.5–1.4)
DIFFERENTIAL METHOD: ABNORMAL
EOSINOPHIL # BLD AUTO: 0.1 K/UL (ref 0–0.5)
EOSINOPHIL NFR BLD: 1.1 % (ref 0–8)
ERYTHROCYTE [DISTWIDTH] IN BLOOD BY AUTOMATED COUNT: 14.1 % (ref 11.5–14.5)
EST. GFR  (NO RACE VARIABLE): >60 ML/MIN/1.73 M^2
GLUCOSE SERPL-MCNC: 85 MG/DL (ref 70–110)
HCT VFR BLD AUTO: 36 % (ref 37–48.5)
HGB BLD-MCNC: 10.9 G/DL (ref 12–16)
IMM GRANULOCYTES # BLD AUTO: 0.02 K/UL (ref 0–0.04)
IMM GRANULOCYTES NFR BLD AUTO: 0.3 % (ref 0–0.5)
LYMPHOCYTES # BLD AUTO: 2.4 K/UL (ref 1–4.8)
LYMPHOCYTES NFR BLD: 37.3 % (ref 18–48)
MAGNESIUM SERPL-MCNC: 1.8 MG/DL (ref 1.6–2.6)
MCH RBC QN AUTO: 25 PG (ref 27–31)
MCHC RBC AUTO-ENTMCNC: 30.3 G/DL (ref 32–36)
MCV RBC AUTO: 83 FL (ref 82–98)
MONOCYTES # BLD AUTO: 0.5 K/UL (ref 0.3–1)
MONOCYTES NFR BLD: 8.3 % (ref 4–15)
NEUTROPHILS # BLD AUTO: 3.4 K/UL (ref 1.8–7.7)
NEUTROPHILS NFR BLD: 52.4 % (ref 38–73)
NRBC BLD-RTO: 0 /100 WBC
PLATELET # BLD AUTO: 341 K/UL (ref 150–450)
PMV BLD AUTO: 9.5 FL (ref 9.2–12.9)
POTASSIUM SERPL-SCNC: 4.2 MMOL/L (ref 3.5–5.1)
PROT SERPL-MCNC: 6.6 G/DL (ref 6–8.4)
RBC # BLD AUTO: 4.36 M/UL (ref 4–5.4)
SODIUM SERPL-SCNC: 141 MMOL/L (ref 136–145)
WBC # BLD AUTO: 6.49 K/UL (ref 3.9–12.7)

## 2023-06-10 PROCEDURE — 85025 COMPLETE CBC W/AUTO DIFF WBC: CPT | Performed by: INTERNAL MEDICINE

## 2023-06-10 PROCEDURE — 80053 COMPREHEN METABOLIC PANEL: CPT | Performed by: INTERNAL MEDICINE

## 2023-06-10 PROCEDURE — 99233 SBSQ HOSP IP/OBS HIGH 50: CPT | Mod: ,,, | Performed by: SURGERY

## 2023-06-10 PROCEDURE — 21400001 HC TELEMETRY ROOM

## 2023-06-10 PROCEDURE — 63600175 PHARM REV CODE 636 W HCPCS: Performed by: INTERNAL MEDICINE

## 2023-06-10 PROCEDURE — 99233 PR SUBSEQUENT HOSPITAL CARE,LEVL III: ICD-10-PCS | Mod: ,,, | Performed by: SURGERY

## 2023-06-10 PROCEDURE — 99233 SBSQ HOSP IP/OBS HIGH 50: CPT | Mod: ,,, | Performed by: INTERNAL MEDICINE

## 2023-06-10 PROCEDURE — S0030 INJECTION, METRONIDAZOLE: HCPCS | Performed by: EMERGENCY MEDICINE

## 2023-06-10 PROCEDURE — 83735 ASSAY OF MAGNESIUM: CPT | Performed by: INTERNAL MEDICINE

## 2023-06-10 PROCEDURE — 36415 COLL VENOUS BLD VENIPUNCTURE: CPT | Performed by: INTERNAL MEDICINE

## 2023-06-10 PROCEDURE — 25000003 PHARM REV CODE 250: Performed by: EMERGENCY MEDICINE

## 2023-06-10 PROCEDURE — 99233 PR SUBSEQUENT HOSPITAL CARE,LEVL III: ICD-10-PCS | Mod: ,,, | Performed by: INTERNAL MEDICINE

## 2023-06-10 RX ADMIN — CIPROFLOXACIN 400 MG: 2 INJECTION, SOLUTION INTRAVENOUS at 05:06

## 2023-06-10 RX ADMIN — METRONIDAZOLE 500 MG: 5 INJECTION, SOLUTION INTRAVENOUS at 10:06

## 2023-06-10 RX ADMIN — CIPROFLOXACIN 400 MG: 2 INJECTION, SOLUTION INTRAVENOUS at 06:06

## 2023-06-10 RX ADMIN — METRONIDAZOLE 500 MG: 5 INJECTION, SOLUTION INTRAVENOUS at 12:06

## 2023-06-10 RX ADMIN — METRONIDAZOLE 500 MG: 5 INJECTION, SOLUTION INTRAVENOUS at 05:06

## 2023-06-10 NOTE — ASSESSMENT & PLAN NOTE
-fat stranding of the rectosigmoid colon with surrounding abscesses and raises suspicion of IBD (Crohn's/UC) or possible malignancy.    -continue IV antibiotics.    -general surgery consult appreciated  - will follow up with GI

## 2023-06-10 NOTE — PROGRESS NOTES
[facility]  Progress Note    Patient Name: Kaylan Lal  MRN: [id number]  Patient Class: IP- Inpatient   Admission Date: 6/8/2023  Length of Stay: 1 days  Attending Physician: Smita Sandoval MD  Primary Care Provider: Jyothi Edwards MD        Subjective:     Principal Problem:Abnormal abdominal CT scan        HPI:  Ms. Lal is a 53-year-old  female with no significant medical problems, presented to the ED complaining of 4-5 days of lower abdominal discomfort, tenesmus, nausea without vomiting.  Used preparation H suppository with no improvement, but noticed vaginal bleeding, hence presented to the ED for further evaluation.  Afebrile, hemodynamically stable.  Laboratory workup is unremarkable.  H/H 11 0.5/37.0.  UA reveals few WBC clumps, > 100 WBCs, 26 RBCs, positive for occult blood.  CT abdomen pelvis reveals fat stranding and mucosal thickening of the rectosigmoid colon, with two surrounding loculated abscesses 3.0 x 3.5 cm, 2.8 x 2.4 cm with no evidence of acute appendicitis.  Patient received IV Rocephin, metronidazole in the ED.  Patient allergic to penicillins.  Afebrile, hemodynamically stable.  General surgeon, Dr. Bennett was consulted, recommended admission to hospital medicine.      Overview/Hospital Course:  Patient admitted initially kept NPO with diagnosis colitis with abscesses.  She was started on Cipro and Flagyl.  General surgery and GI were consulted repeat CT scan obtained which was concerning for possible colovesicular fistula.  CT was again repeated which revealed no colovesicular fistula or extravasation of contrast.  Unsure of exact etiology however patient will require 2 weeks of antibiotics and we will need follow up with GI for colonoscopy and EGD.  Patient is feeling much better tolerating diet if she is this much better in a.m. we will plan on DC      Interval History:  Patient is awake and alert denies any  pain.    Review of Systems   Constitutional:  Negative for fatigue and fever.   Respiratory:  Negative for cough and shortness of breath.    Cardiovascular:  Negative for chest pain and leg swelling.   Gastrointestinal:  Negative for nausea and vomiting.   All other systems reviewed and are negative.  Objective:     Vital Signs (Most Recent):  Temp: 98.6 °F (37 °C) (06/10/23 1651)  Pulse: 67 (06/10/23 1651)  Resp: 18 (06/10/23 1651)  BP: 129/67 (06/10/23 1651)  SpO2: 100 % (06/10/23 1651) Vital Signs (24h Range):  Temp:  [98.2 °F (36.8 °C)-98.6 °F (37 °C)] 98.6 °F (37 °C)  Pulse:  [63-75] 67  Resp:  [14-18] 18  SpO2:  [97 %-100 %] 100 %  BP: (102-154)/(55-79) 129/67     Weight: 96 kg (211 lb 10.3 oz)  Body mass index is 32.18 kg/m².    Intake/Output Summary (Last 24 hours) at 6/10/2023 1656  Last data filed at 6/10/2023 0615  Gross per 24 hour   Intake 2375.51 ml   Output --   Net 2375.51 ml         Physical Exam  Vitals reviewed.   Constitutional:       Appearance: Normal appearance.   HENT:      Head: Normocephalic and atraumatic.      Mouth/Throat:      Mouth: Mucous membranes are moist.      Pharynx: Oropharynx is clear.   Eyes:      Extraocular Movements: Extraocular movements intact.      Conjunctiva/sclera: Conjunctivae normal.   Cardiovascular:      Rate and Rhythm: Normal rate and regular rhythm.      Pulses: Normal pulses.      Heart sounds: Normal heart sounds.   Pulmonary:      Effort: Pulmonary effort is normal.      Breath sounds: Normal breath sounds.   Abdominal:      General: Bowel sounds are normal.      Palpations: Abdomen is soft.      Tenderness: There is abdominal tenderness.   Musculoskeletal:         General: Normal range of motion.      Cervical back: Normal range of motion and neck supple.   Skin:     General: Skin is warm and dry.   Neurological:      General: No focal deficit present.      Mental Status: She is alert and oriented to person, place, and time. Mental status is at baseline.    Psychiatric:         Mood and Affect: Mood normal.         Behavior: Behavior normal.         Thought Content: Thought content normal.           Significant Labs: All pertinent labs within the past 24 hours have been reviewed.  Blood Culture: No results for input(s): LABBLOO in the last 48 hours.  CBC:   Recent Labs   Lab 06/08/23  1953 06/10/23  0442   WBC 9.23 6.49   HGB 11.5* 10.9*   HCT 37.0 36.0*    341     CMP:   Recent Labs   Lab 06/08/23  1953 06/10/23  0442    141   K 3.7 4.2    109   CO2 23 24    85   BUN 11 7   CREATININE 0.9 0.8   CALCIUM 10.0 9.4   PROT 7.8 6.6   ALBUMIN 3.5 2.8*   BILITOT 0.4 0.2   ALKPHOS 110 100   AST 22 19   ALT 20 19   ANIONGAP 11 8       Significant Imaging: I have reviewed all pertinent imaging results/findings within the past 24 hours.      Assessment/Plan:      * Abnormal abdominal CT scan  Bowel wall thickening in the rectosigmoid colon with perirectal fluid collections negative for fistula formation.  Seen by GI and surgery with recommendation for 2 weeks of antibiotic with scheduled EGD and colonoscopy at that time.      Iron deficiency anemia        Colitis  -fat stranding of the rectosigmoid colon with surrounding abscesses and raises suspicion of IBD (Crohn's/UC) or possible malignancy.    -continue IV antibiotics.    -general surgery consult appreciated  - will follow up with GI    Intra-abdominal abscess  -two loculated abscesses around the rectosigmoid colon.    -3.0 x 3.5 cm, 2.8 x 2.4 cm.    -continue IV ciprofloxacin, IV metronidazole.      -IV fluids.        VTE Risk Mitigation (From admission, onward)         Ordered     Place sequential compression device  Until discontinued         06/09/23 0141                Discharge Planning   EMELINA:      Code Status: Full Code   Is the patient medically ready for discharge?:     Reason for patient still in hospital (select all that apply): Patient trending condition and Consult  recommendations  Discharge Plan A: Home with family                  Smita Orona MD  Department of Hospital Medicine   [facility] Tele[facility] ([facility])

## 2023-06-10 NOTE — ASSESSMENT & PLAN NOTE
Per IR, cannot be percutaneously drained. Concern for diverticulitis versus malignancy    - CT with rectal contrast negative for fistula or leak. Patient is feeling well, abdominal exam benign, VSS, and labwork acceptable. OK for a soft diet. Continue antibiotics. If continues to progress well, may discharge home tomorrow on a soft diet and oral cipro/flagyl. She will need a colonoscopy in 6-8 weeks. Follow up with me in office.

## 2023-06-10 NOTE — ASSESSMENT & PLAN NOTE
Bowel wall thickening in the rectosigmoid colon with perirectal fluid collections negative for fistula formation.  Seen by GI and surgery with recommendation for 2 weeks of antibiotic with scheduled EGD and colonoscopy at that time.

## 2023-06-10 NOTE — SUBJECTIVE & OBJECTIVE
Subjective:     Interval History: no events overnight, remains afebrile on antibiotics.  Says she feels much better.  Is tolerating clear liquids but says she is just nervous to eat solid food but is denying nausea    Review of Systems   All other systems reviewed and are negative.  Objective:     Vital Signs (Most Recent):  Temp: 98.2 °F (36.8 °C) (06/10/23 1309)  Pulse: 64 (06/10/23 1309)  Resp: 18 (06/10/23 1309)  BP: (!) 154/77 (06/10/23 1309)  SpO2: 99 % (06/10/23 1309) Vital Signs (24h Range):  Temp:  [98.2 °F (36.8 °C)-98.4 °F (36.9 °C)] 98.2 °F (36.8 °C)  Pulse:  [63-75] 64  Resp:  [14-18] 18  SpO2:  [97 %-100 %] 99 %  BP: (102-154)/(55-79) 154/77     Weight: 96 kg (211 lb 10.3 oz) (06/09/23 0353)  Body mass index is 32.18 kg/m².      Intake/Output Summary (Last 24 hours) at 6/10/2023 1423  Last data filed at 6/10/2023 0615  Gross per 24 hour   Intake 2375.51 ml   Output --   Net 2375.51 ml       Lines/Drains/Airways       Peripheral Intravenous Line  Duration                  Peripheral IV - Single Lumen 06/08/23 2220 20 G Right Antecubital 1 day                     Physical Exam  Constitutional:       Appearance: Normal appearance.   HENT:      Head: Normocephalic and atraumatic.   Eyes:      General: No scleral icterus.        Right eye: No discharge.      Extraocular Movements: Extraocular movements intact.   Cardiovascular:      Rate and Rhythm: Regular rhythm.      Pulses: Normal pulses.      Heart sounds: Normal heart sounds.   Pulmonary:      Effort: Pulmonary effort is normal.      Breath sounds: Normal breath sounds.   Abdominal:      General: There is no distension.      Palpations: Abdomen is soft.      Tenderness: There is abdominal tenderness. There is no guarding or rebound.   Musculoskeletal:         General: No swelling or tenderness.   Skin:     General: Skin is warm and dry.   Neurological:      General: No focal deficit present.      Mental Status: She is alert and oriented to person,  place, and time.        Significant Labs:  CBC:   Recent Labs   Lab 06/08/23  1953 06/10/23  0442   WBC 9.23 6.49   HGB 11.5* 10.9*   HCT 37.0 36.0*    341     CMP:   Recent Labs   Lab 06/10/23  0442   GLU 85   CALCIUM 9.4   ALBUMIN 2.8*   PROT 6.6      K 4.2   CO2 24      BUN 7   CREATININE 0.8   ALKPHOS 100   ALT 19   AST 19   BILITOT 0.2         Significant Imaging:  Imaging results within the past 24 hours have been reviewed.  CT with rectal contrast shows bowel wall thickening in sigmoid colon but no fistula or extravasation of contrast

## 2023-06-10 NOTE — PROGRESS NOTES
[facility] Tele[facility] ([facility])  Gastroenterology  Progress Note    Patient Name: Kaylan Lal  MRN: [id number]  Admission Date: 6/8/2023  Hospital Length of Stay: 1 days  Code Status: Full Code   Attending Provider: Smita Sandoval MD  Consulting Provider: Gladys Teixeira MD  Primary Care Physician: Jyothi Edwards MD  Principal Problem: Abnormal abdominal CT scan        Subjective:     Interval History: no events overnight, remains afebrile on antibiotics.  Says she feels much better.  Is tolerating clear liquids but says she is just nervous to eat solid food but is denying nausea    Review of Systems   All other systems reviewed and are negative.  Objective:     Vital Signs (Most Recent):  Temp: 98.2 °F (36.8 °C) (06/10/23 1309)  Pulse: 64 (06/10/23 1309)  Resp: 18 (06/10/23 1309)  BP: (!) 154/77 (06/10/23 1309)  SpO2: 99 % (06/10/23 1309) Vital Signs (24h Range):  Temp:  [98.2 °F (36.8 °C)-98.4 °F (36.9 °C)] 98.2 °F (36.8 °C)  Pulse:  [63-75] 64  Resp:  [14-18] 18  SpO2:  [97 %-100 %] 99 %  BP: (102-154)/(55-79) 154/77     Weight: 96 kg (211 lb 10.3 oz) (06/09/23 0353)  Body mass index is 32.18 kg/m².      Intake/Output Summary (Last 24 hours) at 6/10/2023 1423  Last data filed at 6/10/2023 0615  Gross per 24 hour   Intake 2375.51 ml   Output --   Net 2375.51 ml       Lines/Drains/Airways       Peripheral Intravenous Line  Duration                  Peripheral IV - Single Lumen 06/08/23 2220 20 G Right Antecubital 1 day                     Physical Exam  Constitutional:       Appearance: Normal appearance.   HENT:      Head: Normocephalic and atraumatic.   Eyes:      General: No scleral icterus.        Right eye: No discharge.      Extraocular Movements: Extraocular movements intact.   Cardiovascular:      Rate and Rhythm: Regular rhythm.      Pulses: Normal pulses.      Heart sounds: Normal heart sounds.   Pulmonary:      Effort: Pulmonary effort is normal.      Breath sounds: Normal breath sounds.    Abdominal:      General: There is no distension.      Palpations: Abdomen is soft.      Tenderness: There is abdominal tenderness. There is no guarding or rebound.   Musculoskeletal:         General: No swelling or tenderness.   Skin:     General: Skin is warm and dry.   Neurological:      General: No focal deficit present.      Mental Status: She is alert and oriented to person, place, and time.        Significant Labs:  CBC:   Recent Labs   Lab 06/08/23  1953 06/10/23  0442   WBC 9.23 6.49   HGB 11.5* 10.9*   HCT 37.0 36.0*    341     CMP:   Recent Labs   Lab 06/10/23  0442   GLU 85   CALCIUM 9.4   ALBUMIN 2.8*   PROT 6.6      K 4.2   CO2 24      BUN 7   CREATININE 0.8   ALKPHOS 100   ALT 19   AST 19   BILITOT 0.2         Significant Imaging:  Imaging results within the past 24 hours have been reviewed.  CT with rectal contrast shows bowel wall thickening in sigmoid colon but no fistula or extravasation of contrast     Assessment/Plan:     Oncology  Iron deficiency anemia  Recommend iron studies, EGD and colonoscopy as outpatient.      GI  * Abnormal abdominal CT scan  Pt with bowel wall thickening in rectosigmoid colon with perirectal fluid collections.  Imaging negative for fistula. No clear diverticulosis in this area.  Etiology of these changes on CT not clear -- could be perforated diverticulitis, however, malignancy is of concern.  She has no symptoms to suggest an infectious colitis and would not expect phani-rectal fluid collections with infectious colitis.  Agree with treating with cipro and flagyl.  Recommend treating 10 - 14 days as if complicated diverticulitis.  She needs a colonoscopy to further evaluate for malignancy.  I discussed the timing of this with both Dr. Bennett and the patient.  My recommendation is to proceed in the next 2-3 weeks (instead of waiting 6 weeks).  There is no extravasation of contrast and will have completed antibiotics.  I discussed the risk of  perforation with the patient but believe the benefit outweighs the risk.  She is anemic but no current iron studies done.  I discussed that we should do an EGD at the time of the colonoscopy and check iron studies as an outpatient.  She is ok for discharge from GI perspective.  I will arrange the outpatient EGD and colonoscopy.  There is no utility in checking CEA or other tumor markers for diagnostic purposes as these can been falsely elevated with inflammation of the colon for other reasons and can also be falsely negative.        Intra-abdominal abscess  No indication for drainage.  She is responding to antibiotics.  Recommend 10-14 days of outpatient antibiotics.        Thank you for your consult. I will sign off. Please contact us if you have any additional questions.    Gladys Teixeira MD  Gastroenterology  [facility] ([facility])

## 2023-06-10 NOTE — SUBJECTIVE & OBJECTIVE
Interval History: States she is feeling great today. No pain. Had a bowel movement.    Medications:  Continuous Infusions:  Scheduled Meds:   ciprofloxacin (CIPRO)400mg/200ml D5W IVPB  400 mg Intravenous Q12H    metronidazole  500 mg Intravenous Q8H     PRN Meds:acetaminophen, aluminum-magnesium hydroxide-simethicone, guaiFENesin 100 mg/5 ml, hydrALAZINE, morphine, ondansetron, prochlorperazine     Review of patient's allergies indicates:   Allergen Reactions    Codeine      Other reaction(s): Unknown    Hydrocodone-acetaminophen     Penicillins      Objective:     Vital Signs (Most Recent):  Temp: 98.4 °F (36.9 °C) (06/10/23 0803)  Pulse: 70 (06/10/23 0803)  Resp: 16 (06/10/23 0803)  BP: (!) 153/79 (06/10/23 0803)  SpO2: 99 % (06/10/23 0803) Vital Signs (24h Range):  Temp:  [98 °F (36.7 °C)-98.4 °F (36.9 °C)] 98.4 °F (36.9 °C)  Pulse:  [63-75] 70  Resp:  [14-18] 16  SpO2:  [96 %-100 %] 99 %  BP: (102-153)/(55-79) 153/79     Weight: 96 kg (211 lb 10.3 oz)  Body mass index is 32.18 kg/m².    Intake/Output - Last 3 Shifts         06/08 0700  06/09 0659 06/09 0700  06/10 0659 06/10 0700  06/11 0659    I.V. (mL/kg) 340.1 (3.5) 1683.1 (17.5)     IV Piggyback 332.2 692.4     Total Intake(mL/kg) 672.4 (7) 2375.5 (24.7)     Net +672.4 +2375.5            Urine Occurrence  1 x              Physical Exam  Vitals and nursing note reviewed.   Constitutional:       General: She is not in acute distress.     Appearance: She is not ill-appearing, toxic-appearing or diaphoretic.   HENT:      Head: Normocephalic and atraumatic.      Nose: Nose normal.   Eyes:      General: No scleral icterus.        Right eye: No discharge.         Left eye: No discharge.      Extraocular Movements: Extraocular movements intact.   Cardiovascular:      Rate and Rhythm: Normal rate and regular rhythm.   Pulmonary:      Effort: No respiratory distress.      Breath sounds: No stridor.   Abdominal:      General: There is no distension.      Palpations:  Abdomen is soft.      Tenderness: There is no abdominal tenderness.   Musculoskeletal:      Cervical back: No rigidity.   Skin:     General: Skin is warm and dry.      Coloration: Skin is not jaundiced.   Neurological:      Mental Status: She is alert and oriented to person, place, and time.   Psychiatric:         Mood and Affect: Mood normal.         Behavior: Behavior normal.        Significant Labs:  I have reviewed all pertinent lab results within the past 24 hours.  CBC:   Recent Labs   Lab 06/10/23  0442   WBC 6.49   RBC 4.36   HGB 10.9*   HCT 36.0*      MCV 83   MCH 25.0*   MCHC 30.3*     BMP:   Recent Labs   Lab 06/10/23  0442   GLU 85      K 4.2      CO2 24   BUN 7   CREATININE 0.8   CALCIUM 9.4   MG 1.8       Significant Diagnostics:  I have reviewed all pertinent imaging results/findings within the past 24 hours.

## 2023-06-10 NOTE — ASSESSMENT & PLAN NOTE
No indication for drainage.  She is responding to antibiotics.  Recommend 10-14 days of outpatient antibiotics.

## 2023-06-10 NOTE — PROGRESS NOTES
[facility])  General Surgery  Progress Note    Subjective:     History of Present Illness:  53-year-old female who presented to the ED for evaluation of lower abdominal/pelvic pain onset Sunday and worsening since then.  She has associated nausea but no vomiting.  She reports subjective fever and chills at home.  She has had some hematuria as well as hematochezia as well.  She reports a history of hemorrhoids.  She is never experienced anything like this before.  Her past abdominal surgical history is significant for laparoscopic tubal ligation.  She is not a smoker.  CT performed in the ED concerning for pelvic abscess possibly arising from the rectosigmoid junction.  Patient also with UA concerning for UTI, which patient states she does not frequently get.  White count within normal limits and patient is afebrile.      Post-Op Info:  * No surgery found *         Interval History: States she is feeling great today. No pain. Had a bowel movement.    Medications:  Continuous Infusions:  Scheduled Meds:   ciprofloxacin (CIPRO)400mg/200ml D5W IVPB  400 mg Intravenous Q12H    metronidazole  500 mg Intravenous Q8H     PRN Meds:acetaminophen, aluminum-magnesium hydroxide-simethicone, guaiFENesin 100 mg/5 ml, hydrALAZINE, morphine, ondansetron, prochlorperazine     Review of patient's allergies indicates:   Allergen Reactions    Codeine      Other reaction(s): Unknown    Hydrocodone-acetaminophen     Penicillins      Objective:     Vital Signs (Most Recent):  Temp: 98.4 °F (36.9 °C) (06/10/23 0803)  Pulse: 70 (06/10/23 0803)  Resp: 16 (06/10/23 0803)  BP: (!) 153/79 (06/10/23 0803)  SpO2: 99 % (06/10/23 0803) Vital Signs (24h Range):  Temp:  [98 °F (36.7 °C)-98.4 °F (36.9 °C)] 98.4 °F (36.9 °C)  Pulse:  [63-75] 70  Resp:  [14-18] 16  SpO2:  [96 %-100 %] 99 %  BP: (102-153)/(55-79) 153/79     Weight: 96 kg (211 lb 10.3 oz)  Body mass index is 32.18 kg/m².    Intake/Output - Last 3 Shifts         06/08  0700  06/09 0659 06/09 0700  06/10 0659 06/10 0700  06/11 0659    I.V. (mL/kg) 340.1 (3.5) 1683.1 (17.5)     IV Piggyback 332.2 692.4     Total Intake(mL/kg) 672.4 (7) 2375.5 (24.7)     Net +672.4 +2375.5            Urine Occurrence  1 x              Physical Exam  Vitals and nursing note reviewed.   Constitutional:       General: She is not in acute distress.     Appearance: She is not ill-appearing, toxic-appearing or diaphoretic.   HENT:      Head: Normocephalic and atraumatic.      Nose: Nose normal.   Eyes:      General: No scleral icterus.        Right eye: No discharge.         Left eye: No discharge.      Extraocular Movements: Extraocular movements intact.   Cardiovascular:      Rate and Rhythm: Normal rate and regular rhythm.   Pulmonary:      Effort: No respiratory distress.      Breath sounds: No stridor.   Abdominal:      General: There is no distension.      Palpations: Abdomen is soft.      Tenderness: There is no abdominal tenderness.   Musculoskeletal:      Cervical back: No rigidity.   Skin:     General: Skin is warm and dry.      Coloration: Skin is not jaundiced.   Neurological:      Mental Status: She is alert and oriented to person, place, and time.   Psychiatric:         Mood and Affect: Mood normal.         Behavior: Behavior normal.        Significant Labs:  I have reviewed all pertinent lab results within the past 24 hours.  CBC:   Recent Labs   Lab 06/10/23  0442   WBC 6.49   RBC 4.36   HGB 10.9*   HCT 36.0*      MCV 83   MCH 25.0*   MCHC 30.3*     BMP:   Recent Labs   Lab 06/10/23  0442   GLU 85      K 4.2      CO2 24   BUN 7   CREATININE 0.8   CALCIUM 9.4   MG 1.8       Significant Diagnostics:  I have reviewed all pertinent imaging results/findings within the past 24 hours.    Assessment/Plan:     * Intra-abdominal abscess  Per IR, cannot be percutaneously drained. Concern for diverticulitis versus malignancy    - CT with rectal contrast negative for fistula or leak.  Patient is feeling well, abdominal exam benign, VSS, and labwork acceptable. OK for a soft diet. Continue antibiotics. If continues to progress well, may discharge home tomorrow on a soft diet and oral cipro/flagyl. She will need a colonoscopy in 6 weeks. Follow up with me in office.        Tran Bennett, DO  General Surgery  '[place] [facility] Tele[facility] ([facility])

## 2023-06-10 NOTE — ASSESSMENT & PLAN NOTE
-two loculated abscesses around the rectosigmoid colon.    -3.0 x 3.5 cm, 2.8 x 2.4 cm.    -continue IV ciprofloxacin, IV metronidazole.      -IV fluids.

## 2023-06-10 NOTE — HOSPITAL COURSE
Patient admitted initially kept NPO with diagnosis colitis with abscesses.  She was started on Cipro and Flagyl.  General surgery and GI were consulted repeat CT scan obtained which was concerning for possible colovesicular fistula.  CT was again repeated which revealed no colovesicular fistula or extravasation of contrast.  Unsure of exact etiology however patient will require 2 weeks of antibiotics and we will need follow up with GI for colonoscopy and EGD.  Patient is feeling much better tolerating diet.  Patient seen and examined on day of discharge and deemed stable for discharge.  All questions were answered.  She is instructed to complete her antibiotics and follow up with PCP as well as GI.  She understands she will need endoscopy in the near future.

## 2023-06-10 NOTE — ASSESSMENT & PLAN NOTE
Pt with bowel wall thickening in rectosigmoid colon with perirectal fluid collections.  Imaging negative for fistula. No clear diverticulosis in this area.  Etiology of these changes on CT not clear -- could be perforated diverticulitis, however, malignancy is of concern.  She has no symptoms to suggest an infectious colitis and would not expect phani-rectal fluid collections with infectious colitis.  Agree with treating with cipro and flagyl.  Recommend treating 10 - 14 days as if complicated diverticulitis.  She needs a colonoscopy to further evaluate for malignancy.  I discussed the timing of this with both Dr. Bennett and the patient.  My recommendation is to proceed in the next 2-3 weeks (instead of waiting 6 weeks).  There is no extravasation of contrast and will have completed antibiotics.  I discussed the risk of perforation with the patient but believe the benefit outweighs the risk.  She is anemic but no current iron studies done.  I discussed that we should do an EGD at the time of the colonoscopy and check iron studies as an outpatient.  She is ok for discharge from GI perspective.  I will arrange the outpatient EGD and colonoscopy.  There is no utility in checking CEA or other tumor markers for diagnostic purposes as these can been falsely elevated with inflammation of the colon for other reasons and can also be falsely negative.

## 2023-06-11 VITALS
TEMPERATURE: 98 F | HEIGHT: 68 IN | WEIGHT: 212.31 LBS | SYSTOLIC BLOOD PRESSURE: 149 MMHG | BODY MASS INDEX: 32.18 KG/M2 | RESPIRATION RATE: 18 BRPM | HEART RATE: 59 BPM | DIASTOLIC BLOOD PRESSURE: 77 MMHG | OXYGEN SATURATION: 100 %

## 2023-06-11 PROCEDURE — 99233 PR SUBSEQUENT HOSPITAL CARE,LEVL III: ICD-10-PCS | Mod: ,,, | Performed by: SURGERY

## 2023-06-11 PROCEDURE — 63600175 PHARM REV CODE 636 W HCPCS: Performed by: INTERNAL MEDICINE

## 2023-06-11 PROCEDURE — S0030 INJECTION, METRONIDAZOLE: HCPCS | Performed by: EMERGENCY MEDICINE

## 2023-06-11 PROCEDURE — 99233 SBSQ HOSP IP/OBS HIGH 50: CPT | Mod: ,,, | Performed by: SURGERY

## 2023-06-11 PROCEDURE — 25000003 PHARM REV CODE 250: Performed by: EMERGENCY MEDICINE

## 2023-06-11 RX ORDER — CIPROFLOXACIN 500 MG/1
500 TABLET ORAL EVERY 12 HOURS
Qty: 28 TABLET | Refills: 0 | Status: SHIPPED | OUTPATIENT
Start: 2023-06-11 | End: 2023-06-25

## 2023-06-11 RX ORDER — FLUCONAZOLE 100 MG/1
100 TABLET ORAL DAILY
Qty: 5 TABLET | Refills: 0 | Status: SHIPPED | OUTPATIENT
Start: 2023-06-11 | End: 2023-06-16

## 2023-06-11 RX ORDER — METRONIDAZOLE 500 MG/1
500 TABLET ORAL EVERY 8 HOURS
Qty: 42 TABLET | Refills: 0 | Status: SHIPPED | OUTPATIENT
Start: 2023-06-11 | End: 2023-06-25

## 2023-06-11 RX ADMIN — CIPROFLOXACIN 400 MG: 2 INJECTION, SOLUTION INTRAVENOUS at 05:06

## 2023-06-11 RX ADMIN — METRONIDAZOLE 500 MG: 5 INJECTION, SOLUTION INTRAVENOUS at 01:06

## 2023-06-11 RX ADMIN — METRONIDAZOLE 500 MG: 5 INJECTION, SOLUTION INTRAVENOUS at 08:06

## 2023-06-11 NOTE — PLAN OF CARE
[facility] ([facility])  Discharge Final Note    Primary Care Provider: Jyothi Edwards MD    Expected Discharge Date: 6/11/2023    Final Discharge Note (most recent)       Final Note - 06/11/23 1218          Final Note    Assessment Type Final Discharge Note (P)      Anticipated Discharge Disposition Home or Self Care (P)      Hospital Resources/Appts/Education Provided Appointments scheduled and added to AVS (P)         Post-Acute Status    Discharge Delays None known at this time (P)                      Important Message from Medicare             Contact Info       Tran Bennett DO   Specialty: General Surgery    [id number] [place] [id number]   Phone: [phone]       Next Steps: Schedule an appointment as soon as possible for a visit in 1 week(s)    Jyothi Edwards MD   Specialty: Internal Medicine   Relationship: PCP - General    [address] [place] [id number]   Phone: [phone]       Next Steps: Follow up in 1 week(s)    Gladys Teixeira MD   Specialty: Gastroenterology    [address] [place] [id number]   Phone: [phone]       Next Steps: Follow up in 10 day(s)    Instructions: follow up and schedule EGD and colonscopy          Gloria Dee LMSW 6/11/2023 12:19 PM

## 2023-06-11 NOTE — SUBJECTIVE & OBJECTIVE
"Interval History: States she feels "wonderful" today and is in good spirits.    Medications:  Continuous Infusions:  Scheduled Meds:   ciprofloxacin (CIPRO)400mg/200ml D5W IVPB  400 mg Intravenous Q12H    metronidazole  500 mg Intravenous Q8H     PRN Meds:acetaminophen, aluminum-magnesium hydroxide-simethicone, guaiFENesin 100 mg/5 ml, hydrALAZINE, morphine, ondansetron, prochlorperazine     Review of patient's allergies indicates:   Allergen Reactions    Codeine      Other reaction(s): Unknown    Hydrocodone-acetaminophen     Penicillins      Objective:     Vital Signs (Most Recent):  Temp: 97.7 °F (36.5 °C) (06/11/23 0742)  Pulse: (!) 59 (06/11/23 0742)  Resp: 18 (06/11/23 0742)  BP: 138/83 (06/11/23 0742)  SpO2: 99 % (06/11/23 0742) Vital Signs (24h Range):  Temp:  [97.7 °F (36.5 °C)-99.1 °F (37.3 °C)] 97.7 °F (36.5 °C)  Pulse:  [59-69] 59  Resp:  [14-18] 18  SpO2:  [94 %-100 %] 99 %  BP: (117-154)/(64-83) 138/83     Weight: 96.3 kg (212 lb 4.9 oz)  Body mass index is 32.28 kg/m².    Intake/Output - Last 3 Shifts         06/09 0700  06/10 0659 06/10 0700  06/11 0659 06/11 0700 06/12 0659    P.O.   250    I.V. (mL/kg) 1683.1 (17.5)      IV Piggyback 692.4 624.5 153.5    Total Intake(mL/kg) 2375.5 (24.7) 624.5 (6.5) 403.5 (4.2)    Net +2375.5 +624.5 +403.5           Urine Occurrence 1 x  1 x    Stool Occurrence   1 x             Physical Exam  Vitals and nursing note reviewed.   Constitutional:       General: She is not in acute distress.     Appearance: She is not ill-appearing, toxic-appearing or diaphoretic.   HENT:      Head: Normocephalic and atraumatic.      Nose: Nose normal.   Eyes:      General: No scleral icterus.        Right eye: No discharge.         Left eye: No discharge.      Extraocular Movements: Extraocular movements intact.   Cardiovascular:      Rate and Rhythm: Normal rate and regular rhythm.   Pulmonary:      Effort: No respiratory distress.      Breath sounds: No stridor.   Abdominal:      " General: There is no distension.      Palpations: Abdomen is soft.      Tenderness: There is no abdominal tenderness.   Musculoskeletal:      Cervical back: No rigidity.   Skin:     General: Skin is warm and dry.      Coloration: Skin is not jaundiced.   Neurological:      Mental Status: She is alert and oriented to person, place, and time.   Psychiatric:         Mood and Affect: Mood normal.         Behavior: Behavior normal.        Significant Labs:  I have reviewed all pertinent lab results within the past 24 hours.  CBC:   Recent Labs   Lab 06/10/23  0442   WBC 6.49   RBC 4.36   HGB 10.9*   HCT 36.0*      MCV 83   MCH 25.0*   MCHC 30.3*     BMP:   Recent Labs   Lab 06/10/23  0442   GLU 85      K 4.2      CO2 24   BUN 7   CREATININE 0.8   CALCIUM 9.4   MG 1.8       Significant Diagnostics:  I have reviewed all pertinent imaging results/findings within the past 24 hours.

## 2023-06-11 NOTE — DISCHARGE SUMMARY
[facility] Medicine  Discharge Summary      Patient Name: Kaylan Lal  MRN: [id number]  MIRA: [id number]  Patient Class: IP- Inpatient  Admission Date: 6/8/2023  Hospital Length of Stay: 2 days  Discharge Date and Time: 6/11/2023  1:32 PM  Attending Physician: No att. providers found   Discharging Provider: Smita Orona MD  Primary Care Provider: Jyothi Edwards MD    Primary Care Team: Networked reference to record PCT     HPI:   Ms. Lal is a 53-year-old  female with no significant medical problems, presented to the ED complaining of 4-5 days of lower abdominal discomfort, tenesmus, nausea without vomiting.  Used preparation H suppository with no improvement, but noticed vaginal bleeding, hence presented to the ED for further evaluation.  Afebrile, hemodynamically stable.  Laboratory workup is unremarkable.  H/H 11 0.5/37.0.  UA reveals few WBC clumps, > 100 WBCs, 26 RBCs, positive for occult blood.  CT abdomen pelvis reveals fat stranding and mucosal thickening of the rectosigmoid colon, with two surrounding loculated abscesses 3.0 x 3.5 cm, 2.8 x 2.4 cm with no evidence of acute appendicitis.  Patient received IV Rocephin, metronidazole in the ED.  Patient allergic to penicillins.  Afebrile, hemodynamically stable.  General surgeon, Dr. Bennett was consulted, recommended admission to hospital medicine.      * No surgery found *      Hospital Course:   Patient admitted initially kept NPO with diagnosis colitis with abscesses.  She was started on Cipro and Flagyl.  General surgery and GI were consulted repeat CT scan obtained which was concerning for possible colovesicular fistula.  CT was again repeated which revealed no colovesicular fistula or extravasation of contrast.  Unsure of exact etiology however patient will require 2 weeks of antibiotics and we will need follow up with GI for colonoscopy and EGD.  Patient is feeling much better tolerating  diet.  Patient seen and examined on day of discharge and deemed stable for discharge.  All questions were answered.  She is instructed to complete her antibiotics and follow up with PCP as well as GI.  She understands she will need endoscopy in the near future.       Goals of Care Treatment Preferences:  Code Status: Full Code      Consults:   Consults (From admission, onward)        Status Ordering Provider     Inpatient consult to Interventional Radiology  Once        Provider:  Joseph Gonzalez MD    Completed NARENDRA ESTRELLA     Inpatient consult to Gastroenterology  Once        Provider:  Gladys Teixeira MD    Completed KATIE SIMS          Oncology  Iron deficiency anemia        GI  * Abnormal abdominal CT scan  Bowel wall thickening in the rectosigmoid colon with perirectal fluid collections negative for fistula formation.  Seen by GI and surgery with recommendation for 2 weeks of antibiotic with scheduled EGD and colonoscopy at that time.      Colitis  -fat stranding of the rectosigmoid colon with surrounding abscesses and raises suspicion of IBD (Crohn's/UC) or possible malignancy.    -transitioned to p.o. antibiotics and follow up in 2 weeks with GI   -general surgery consult appreciated  - will follow up with GI    Intra-abdominal abscess  -two loculated abscesses around the rectosigmoid colon.    -3.0 x 3.5 cm, 2.8 x 2.4 cm.    -patient was initially treated with empiric IV antibiotics and bowel rest.  She was seen by GI with recommendations for endoscopy the end of 2 weeks of antibiotics.        Final Active Diagnoses:    Diagnosis Date Noted POA    PRINCIPAL PROBLEM:  Abnormal abdominal CT scan [R93.5] 06/09/2023 Yes    Iron deficiency anemia [D50.9] 06/10/2023 Yes    Intra-abdominal abscess [K65.1] 06/09/2023 Yes    Colitis [K52.9] 06/09/2023 Yes      Problems Resolved During this Admission:       Discharged Condition: fair    Disposition: Home or Self Care    Follow Up:   Follow-up  Information     Tran Bennett, DO. Schedule an appointment as soon as possible for a visit in 1 week(s).    Specialty: General Surgery  Contact information:  [address]  [place] [address] [phone]             Jyothi Edwards MD Follow up in 1 week(s).    Specialty: Internal Medicine  Contact information:  [address] [facility] [id number] [phone]             Gladys Teixeira MD Follow up in 10 day(s).    Specialty: Gastroenterology  Why: follow up and schedule EGD and colonscopy  Contact information:  [address]  [place] [address] [phone]                       Patient Instructions:      Ambulatory referral/consult to [facility]   Standing Status: Future   Referral Priority: Routine Referral Type: Consultation   Referral Reason: Specialty Services Required   Number of Visits Requested: 1     Diet Adult Regular     Notify your health care provider if you experience any of the following:  temperature >100.4     Notify your health care provider if you experience any of the following:  persistent nausea and vomiting or diarrhea     Notify your health care provider if you experience any of the following:  severe uncontrolled pain     Activity as tolerated       Significant Diagnostic Studies: Labs:   CMP   Recent Labs   Lab 06/10/23  0442      K 4.2      CO2 24   GLU 85   BUN 7   CREATININE 0.8   CALCIUM 9.4   PROT 6.6   ALBUMIN 2.8*   BILITOT 0.2   ALKPHOS 100   AST 19   ALT 19   ANIONGAP 8   , CBC   Recent Labs   Lab 06/10/23  0442   WBC 6.49   HGB 10.9*   HCT 36.0*       and All labs within the past 24 hours have been reviewed  Radiology: CT scan: CT ABDOMEN PELVIS WITH CONTRAST:   Results for orders placed or performed during the hospital encounter of 06/08/23   CT Abdomen Pelvis With Contrast    Narrative    EXAMINATION:  CT ABDOMEN PELVIS WITH CONTRAST    CLINICAL HISTORY:  LLQ abdominal pain;    TECHNIQUE:  Low dose axial  images, sagittal and coronal reformations were obtained from the lung bases to the pubic symphysis following the IV administration of 100 mL of Omnipaque 350.    COMPARISON:  None    FINDINGS:  Heart: Normal size as far as seen. No effusion as far as seen.    Lung Bases: Clear.    Liver: Fatty infiltration of the liver no focal lesions.    Gallbladder: No calcified gallstones.    Bile Ducts: No dilatation.    Pancreas: No mass. No peripancreatic fat stranding.    Spleen: Normal.    Adrenals: Normal.    Kidneys/Ureters: Normal enhancement.  No mass or  hydroureteronephrosis.    Bladder: No wall thickening.    Reproductive organs: Patient is status post hysterectomy.  The vaginal cuff also appears mildly thickened adjacent to the area of described inflammatory infectious change.    GI Tract/Mesentery: No bowel obstruction.  Fat stranding and mucosal thickening of the rectosigmoid colon..  Colonic fat stranding leads to question loculated fluid collections which may relate to foci of abscess.  Underlying enteric fistula is not excluded.  The question foci of abscess measures 3.0 by 3.5 and 2.8 by 2.4 and appear multiloculated.  No evidence of acute appendicitis.    Peritoneal Space: No ascites or free air.    Retroperitoneum: No significant adenopathy.    Abdominal wall: Normal.    Vasculature: No aneurysm.    Bones: No acute fracture. No suspicious lytic or sclerotic lesions.      Impression    No bowel obstruction.  Fat stranding and mucosal thickening of the rectosigmoid colon.  Perirectal fat stranding leads to question loculated fluid collections which may relate to foci of abscess.  Underlying enteric fistula is not excluded.  The question foci of abscess measures 3.0 by 3.5 and 2.8 by 2.4 and appear multiloculated. The vaginal cuff also appears mildly thickened adjacent to the area of presumed infectious inflammatory change.    All CT scans at this facility use dose modulation, iterative reconstruction, and/or  weight based dosing when appropriate to reduce radiation dose to as low as reasonably achievable.      Electronically signed by: Ethan Joy  Date:    06/08/2023  Time:    23:16    and CT ABDOMEN PELVIS WITHOUT CONTRAST: No results found for this visit on 06/08/23.    Pending Diagnostic Studies:     None         Medications:  Reconciled Home Medications:      Medication List      START taking these medications    ciprofloxacin HCl 500 MG tablet  Commonly known as: CIPRO  Take 1 tablet (500 mg total) by mouth every 12 (twelve) hours. for 14 days     fluconazole 100 MG tablet  Commonly known as: DIFLUCAN  Take 1 tablet (100 mg total) by mouth once daily. for 5 doses     metroNIDAZOLE 500 MG tablet  Commonly known as: FLAGYL  Take 1 tablet (500 mg total) by mouth every 8 (eight) hours. for 14 days        CONTINUE taking these medications    aspirin 81 MG EC tablet  Commonly known as: ECOTRIN  Take 81 mg by mouth.        STOP taking these medications    ALAHIST PE 2-7.5 mg Tab  Generic drug: dexbrompheniramine-phenyleph     amitriptyline 25 MG tablet  Commonly known as: ELAVIL     azelastine 137 mcg (0.1 %) nasal spray  Commonly known as: ASTELIN     cyclobenzaprine 5 MG tablet  Commonly known as: FLEXERIL     DULoxetine 30 MG capsule  Commonly known as: CYMBALTA     LIDOcaine 5 %  Commonly known as: LIDODERM     meclizine 25 mg tablet  Commonly known as: ANTIVERT     montelukast 10 mg tablet  Commonly known as: SINGULAIR     naproxen 500 MG tablet  Commonly known as: NAPROSYN     pregabalin 75 MG capsule  Commonly known as: LYRICA            Indwelling Lines/Drains at time of discharge:   Lines/Drains/Airways     None                 Time spent on the discharge of patient: 36 minutes         Smita Orona MD  Department of Hospital Medicine  '[facility] ([facility])

## 2023-06-11 NOTE — ASSESSMENT & PLAN NOTE
-two loculated abscesses around the rectosigmoid colon.    -3.0 x 3.5 cm, 2.8 x 2.4 cm.    -patient was initially treated with empiric IV antibiotics and bowel rest.  She was seen by GI with recommendations for endoscopy the end of 2 weeks of antibiotics.

## 2023-06-11 NOTE — PROGRESS NOTES
"[facility]  General Surgery  Progress Note    Subjective:     History of Present Illness:  53-year-old female who presented to the ED for evaluation of lower abdominal/pelvic pain onset Sunday and worsening since then.  She has associated nausea but no vomiting.  She reports subjective fever and chills at home.  She has had some hematuria as well as hematochezia as well.  She reports a history of hemorrhoids.  She is never experienced anything like this before.  Her past abdominal surgical history is significant for laparoscopic tubal ligation.  She is not a smoker.  CT performed in the ED concerning for pelvic abscess possibly arising from the rectosigmoid junction.  Patient also with UA concerning for UTI, which patient states she does not frequently get.  White count within normal limits and patient is afebrile.      Post-Op Info:  * No surgery found *         Interval History: States she feels "wonderful" today and is in good spirits.    Medications:  Continuous Infusions:  Scheduled Meds:   ciprofloxacin (CIPRO)400mg/200ml D5W IVPB  400 mg Intravenous Q12H    metronidazole  500 mg Intravenous Q8H     PRN Meds:acetaminophen, aluminum-magnesium hydroxide-simethicone, guaiFENesin 100 mg/5 ml, hydrALAZINE, morphine, ondansetron, prochlorperazine     Review of patient's allergies indicates:   Allergen Reactions    Codeine      Other reaction(s): Unknown    Hydrocodone-acetaminophen     Penicillins      Objective:     Vital Signs (Most Recent):  Temp: 97.7 °F (36.5 °C) (06/11/23 0742)  Pulse: (!) 59 (06/11/23 0742)  Resp: 18 (06/11/23 0742)  BP: 138/83 (06/11/23 0742)  SpO2: 99 % (06/11/23 0742) Vital Signs (24h Range):  Temp:  [97.7 °F (36.5 °C)-99.1 °F (37.3 °C)] 97.7 °F (36.5 °C)  Pulse:  [59-69] 59  Resp:  [14-18] 18  SpO2:  [94 %-100 %] 99 %  BP: (117-154)/(64-83) 138/83     Weight: 96.3 kg (212 lb 4.9 oz)  Body mass index is 32.28 kg/m².    Intake/Output - Last 3 Shifts         06/09 " 0700  06/10 0659 06/10 0700  06/11 0659 06/11 0700  06/12 0659    P.O.   250    I.V. (mL/kg) 1683.1 (17.5)      IV Piggyback 692.4 624.5 153.5    Total Intake(mL/kg) 2375.5 (24.7) 624.5 (6.5) 403.5 (4.2)    Net +2375.5 +624.5 +403.5           Urine Occurrence 1 x  1 x    Stool Occurrence   1 x             Physical Exam  Vitals and nursing note reviewed.   Constitutional:       General: She is not in acute distress.     Appearance: She is not ill-appearing, toxic-appearing or diaphoretic.   HENT:      Head: Normocephalic and atraumatic.      Nose: Nose normal.   Eyes:      General: No scleral icterus.        Right eye: No discharge.         Left eye: No discharge.      Extraocular Movements: Extraocular movements intact.   Cardiovascular:      Rate and Rhythm: Normal rate and regular rhythm.   Pulmonary:      Effort: No respiratory distress.      Breath sounds: No stridor.   Abdominal:      General: There is no distension.      Palpations: Abdomen is soft.      Tenderness: There is no abdominal tenderness.   Musculoskeletal:      Cervical back: No rigidity.   Skin:     General: Skin is warm and dry.      Coloration: Skin is not jaundiced.   Neurological:      Mental Status: She is alert and oriented to person, place, and time.   Psychiatric:         Mood and Affect: Mood normal.         Behavior: Behavior normal.        Significant Labs:  I have reviewed all pertinent lab results within the past 24 hours.  CBC:   Recent Labs   Lab 06/10/23  0442   WBC 6.49   RBC 4.36   HGB 10.9*   HCT 36.0*      MCV 83   MCH 25.0*   MCHC 30.3*     BMP:   Recent Labs   Lab 06/10/23  0442   GLU 85      K 4.2      CO2 24   BUN 7   CREATININE 0.8   CALCIUM 9.4   MG 1.8       Significant Diagnostics:  I have reviewed all pertinent imaging results/findings within the past 24 hours.    Assessment/Plan:     Intra-abdominal abscess  Per IR, cannot be percutaneously drained. Concern for diverticulitis versus malignancy    -  Discussed with Dr. Teixeira yesterday about timing of colonoscopy due to concerns for possible malignancy. She is willing to perform it in 2-3 weeks along with EGD due to anemia. Patient states, however, that she is unsure about the procedure and will need to think about it. I discussed with her the importance of the procedure and that while there is a risk of perforation, it is low. She is going to see me in office in 1 week and discuss more about it.    OK to discharge home on soft diet for 1 week and 10 day course of cipro/flagyl from surgical standpoint. Follow up in office.        Tran Bennett, DO  General Surgery  [facility] ([facility])

## 2023-06-11 NOTE — NURSING
Sent a message to Dr. Bennett, Dr. Edwards, And Dr. Teixeira for a hospital follow up in  week, and 10 days.

## 2023-06-11 NOTE — ASSESSMENT & PLAN NOTE
Per IR, cannot be percutaneously drained. Concern for diverticulitis versus malignancy    - Discussed with Dr. Teixeira yesterday about timing of colonoscopy due to concerns for possible malignancy. She is willing to perform it in 2-3 weeks along with EGD due to anemia. Patient states, however, that she is unsure about the procedure and will need to think about it. I discussed with her the importance of the procedure and that while there is a risk of perforation, it is low. She is going to see me in office in 1 week and discuss more about it.    OK to discharge home on soft diet for 1 week and 10 day course of cipro/flagyl from surgical standpoint. Follow up in office.

## 2023-06-11 NOTE — ASSESSMENT & PLAN NOTE
-fat stranding of the rectosigmoid colon with surrounding abscesses and raises suspicion of IBD (Crohn's/UC) or possible malignancy.    -transitioned to p.o. antibiotics and follow up in 2 weeks with GI   -general surgery consult appreciated  - will follow up with GI

## 2023-06-11 NOTE — NURSING
Discharge summary, health teachings and instructions given to patient --verbalized understanding. IV removed-no complications noted.  All questions answered with regards to discharge instructions. Reminded of the importance of follow-up appointments.

## 2023-06-12 ENCOUNTER — PES CALL (OUTPATIENT)
Dept: ADMINISTRATIVE | Facility: CLINIC | Age: 53
End: 2023-06-12
Payer: COMMERCIAL

## 2023-06-13 ENCOUNTER — PES CALL (OUTPATIENT)
Dept: ADMINISTRATIVE | Facility: CLINIC | Age: 53
End: 2023-06-13
Payer: COMMERCIAL

## 2023-06-13 ENCOUNTER — HOSPITAL ENCOUNTER (OUTPATIENT)
Dept: PREADMISSION TESTING | Facility: HOSPITAL | Age: 53
Discharge: HOME | End: 2023-06-13
Attending: INTERNAL MEDICINE
Payer: COMMERCIAL

## 2023-06-13 ENCOUNTER — TELEPHONE (OUTPATIENT)
Dept: SURGERY | Facility: CLINIC | Age: 53
End: 2023-06-13
Payer: COMMERCIAL

## 2023-06-13 ENCOUNTER — PATIENT OUTREACH (OUTPATIENT)
Dept: ADMINISTRATIVE | Facility: CLINIC | Age: 53
End: 2023-06-13
Payer: COMMERCIAL

## 2023-06-13 DIAGNOSIS — R93.89 ABNORMAL CT SCAN: ICD-10-CM

## 2023-06-13 DIAGNOSIS — D50.8 OTHER IRON DEFICIENCY ANEMIA: ICD-10-CM

## 2023-06-13 NOTE — TELEPHONE ENCOUNTER
Left VM with patient to schedule hospital follow-up appointment with Dr. Bennett next week. Left Malcolm and Ankush phone numbers.

## 2023-06-13 NOTE — TELEPHONE ENCOUNTER
----- Message from Tran Bennett DO sent at 6/13/2023  2:38 PM CDT -----  Regarding: FW: Hospital follow up    ----- Message -----  From: Emmy Roca RN  Sent: 6/11/2023  12:14 PM CDT  To: Tran Bennett DO  Subject: Hospital follow up                               Please call patient for an appointment in 1 week. Thanks!

## 2023-06-13 NOTE — PROGRESS NOTES
2nd Attempt made to reach patient for TCC call. Left voicemail please call [phone] leave first name, last name, and  for Max I will return your call.

## 2023-06-13 NOTE — PROGRESS NOTES
C3 nurse attempted to contact Kaylan Lal  for a TCC post hospital discharge follow up call. No answer. Left voicemail with callback information. The patient does not have a scheduled HOSFU appointment. Message sent to PCP staff for assistance with scheduling visit with patient.

## 2023-06-14 ENCOUNTER — PES CALL (OUTPATIENT)
Dept: ADMINISTRATIVE | Facility: CLINIC | Age: 53
End: 2023-06-14
Payer: COMMERCIAL

## 2023-06-14 NOTE — PROGRESS NOTES
3rd Attempt made to reach patient for TCC call. Left voicemail please call [phone] leave first name, last name, and  for Max I will return your call.

## 2023-07-28 PROBLEM — K64.4 EXTERNAL HEMORRHOID, BLEEDING: Status: ACTIVE | Noted: 2023-07-28

## 2023-09-12 ENCOUNTER — HOSPITAL ENCOUNTER (OUTPATIENT)
Dept: PREADMISSION TESTING | Facility: HOSPITAL | Age: 53
Discharge: HOME | End: 2023-09-12
Attending: INTERNAL MEDICINE
Payer: COMMERCIAL

## 2023-09-12 DIAGNOSIS — Z12.11 SCREENING FOR COLON CANCER: ICD-10-CM

## 2023-09-12 RX ORDER — SODIUM, POTASSIUM,MAG SULFATES 17.5-3.13G
1 SOLUTION, RECONSTITUTED, ORAL ORAL DAILY
Qty: 1 KIT | Refills: 0 | Status: SHIPPED | OUTPATIENT
Start: 2023-09-12 | End: 2023-09-14

## 2023-10-12 PROBLEM — E88.810 DYSMETABOLIC SYNDROME X: Status: ACTIVE | Noted: 2023-10-12

## 2023-10-19 ENCOUNTER — HOSPITAL ENCOUNTER (OUTPATIENT)
Facility: HOSPITAL | Age: 53
Discharge: HOME | End: 2023-10-19
Attending: INTERNAL MEDICINE | Admitting: INTERNAL MEDICINE
Payer: COMMERCIAL

## 2023-10-19 ENCOUNTER — ANESTHESIA EVENT (OUTPATIENT)
Dept: ENDOSCOPY | Facility: HOSPITAL | Age: 53
End: 2023-10-19
Payer: COMMERCIAL

## 2023-10-19 ENCOUNTER — ANESTHESIA (OUTPATIENT)
Dept: ENDOSCOPY | Facility: HOSPITAL | Age: 53
End: 2023-10-19
Payer: COMMERCIAL

## 2023-10-19 DIAGNOSIS — D50.9 IDA (IRON DEFICIENCY ANEMIA): ICD-10-CM

## 2023-10-19 DIAGNOSIS — D50.8 OTHER IRON DEFICIENCY ANEMIA: Primary | ICD-10-CM

## 2023-10-19 DIAGNOSIS — K52.9 COLITIS: ICD-10-CM

## 2023-10-19 DIAGNOSIS — K63.89 COLONIC MASS: Primary | ICD-10-CM

## 2023-10-19 DIAGNOSIS — R93.5 ABNORMAL ABDOMINAL CT SCAN: ICD-10-CM

## 2023-10-19 PROCEDURE — 27201012 HC FORCEPS, HOT/COLD, DISP: Performed by: INTERNAL MEDICINE

## 2023-10-19 PROCEDURE — 88341 IMHCHEM/IMCYTCHM EA ADD ANTB: CPT | Mod: 26,,, | Performed by: PATHOLOGY

## 2023-10-19 PROCEDURE — 45380 PR COLONOSCOPY,BIOPSY: ICD-10-PCS | Mod: ,,, | Performed by: INTERNAL MEDICINE

## 2023-10-19 PROCEDURE — 43239 EGD BIOPSY SINGLE/MULTIPLE: CPT | Performed by: INTERNAL MEDICINE

## 2023-10-19 PROCEDURE — 37000009 HC ANESTHESIA EA ADD 15 MINS: Performed by: INTERNAL MEDICINE

## 2023-10-19 PROCEDURE — 45380 COLONOSCOPY AND BIOPSY: CPT | Performed by: INTERNAL MEDICINE

## 2023-10-19 PROCEDURE — 88342 IMHCHEM/IMCYTCHM 1ST ANTB: CPT | Performed by: PATHOLOGY

## 2023-10-19 PROCEDURE — 43239 EGD BIOPSY SINGLE/MULTIPLE: CPT | Mod: 51,,, | Performed by: INTERNAL MEDICINE

## 2023-10-19 PROCEDURE — 88305 TISSUE EXAM BY PATHOLOGIST: CPT | Mod: 26,,, | Performed by: PATHOLOGY

## 2023-10-19 PROCEDURE — 63600175 PHARM REV CODE 636 W HCPCS: Performed by: NURSE ANESTHETIST, CERTIFIED REGISTERED

## 2023-10-19 PROCEDURE — 43239 PR EGD, FLEX, W/BIOPSY, SGL/MULTI: ICD-10-PCS | Mod: 51,,, | Performed by: INTERNAL MEDICINE

## 2023-10-19 PROCEDURE — 88342 IMHCHEM/IMCYTCHM 1ST ANTB: CPT | Mod: 26,,, | Performed by: PATHOLOGY

## 2023-10-19 PROCEDURE — 88341 IMHCHEM/IMCYTCHM EA ADD ANTB: CPT | Mod: 59 | Performed by: PATHOLOGY

## 2023-10-19 PROCEDURE — 45380 COLONOSCOPY AND BIOPSY: CPT | Mod: ,,, | Performed by: INTERNAL MEDICINE

## 2023-10-19 PROCEDURE — 88305 TISSUE EXAM BY PATHOLOGIST: CPT | Mod: 59 | Performed by: PATHOLOGY

## 2023-10-19 PROCEDURE — 25000003 PHARM REV CODE 250: Performed by: NURSE ANESTHETIST, CERTIFIED REGISTERED

## 2023-10-19 PROCEDURE — 88342 CHG IMMUNOCYTOCHEMISTRY: ICD-10-PCS | Mod: 26,,, | Performed by: PATHOLOGY

## 2023-10-19 PROCEDURE — 88341 PR IHC OR ICC EACH ADD'L SINGLE ANTIBODY  STAINPR: ICD-10-PCS | Mod: 26,,, | Performed by: PATHOLOGY

## 2023-10-19 PROCEDURE — 88305 TISSUE EXAM BY PATHOLOGIST: ICD-10-PCS | Mod: 26,,, | Performed by: PATHOLOGY

## 2023-10-19 PROCEDURE — 37000008 HC ANESTHESIA 1ST 15 MINUTES: Performed by: INTERNAL MEDICINE

## 2023-10-19 RX ORDER — PROPOFOL 10 MG/ML
VIAL (ML) INTRAVENOUS
Status: DISCONTINUED | OUTPATIENT
Start: 2023-10-19 | End: 2023-10-19

## 2023-10-19 RX ORDER — LIDOCAINE HYDROCHLORIDE 20 MG/ML
INJECTION INTRAVENOUS
Status: DISCONTINUED | OUTPATIENT
Start: 2023-10-19 | End: 2023-10-19

## 2023-10-19 RX ADMIN — PROPOFOL 100 MG: 10 INJECTION, EMULSION INTRAVENOUS at 01:10

## 2023-10-19 RX ADMIN — PROPOFOL 50 MG: 10 INJECTION, EMULSION INTRAVENOUS at 01:10

## 2023-10-19 RX ADMIN — LIDOCAINE HYDROCHLORIDE 100 MG: 20 INJECTION INTRAVENOUS at 01:10

## 2023-10-19 RX ADMIN — PROPOFOL 50 MG: 10 INJECTION, EMULSION INTRAVENOUS at 02:10

## 2023-10-19 NOTE — TRANSFER OF CARE
"Anesthesia Transfer of Care Note    Patient: Kaylan Lal    Procedure(s) Performed: Procedure(s) (LRB):  COLONOSCOPY (N/A)  EGD (ESOPHAGOGASTRODUODENOSCOPY) (N/A)    Patient location: GI    Anesthesia Type: MAC    Transport from OR: Transported from OR on room air with adequate spontaneous ventilation    Post pain: adequate analgesia    Post assessment: no apparent anesthetic complications    Post vital signs: stable    Level of consciousness: awake and responds to stimulation    Nausea/Vomiting: no nausea/vomiting    Complications: none    Transfer of care protocol was followed      Last vitals:   Visit Vitals  /75 (BP Location: Left arm, Patient Position: Lying)   Pulse 75   Temp 36.5 °C (97.7 °F) (Temporal)   Resp 18   Ht 5' 9" (1.753 m)   Wt 92.1 kg (203 lb)   SpO2 100%   Breastfeeding No   BMI 29.98 kg/m²     "

## 2023-10-19 NOTE — ANESTHESIA PREPROCEDURE EVALUATION
10/19/2023  Kaylan Lal is a 53 y.o., female.    Patient Active Problem List   Diagnosis    Concussion without loss of consciousness    Chronic midline low back pain with sciatica    Cervical spine degeneration    Intra-abdominal abscess    Colitis    Abnormal abdominal CT scan    Iron deficiency anemia    External hemorrhoid, bleeding    Dysmetabolic syndrome X     Past Surgical History:   Procedure Laterality Date    HYSTERECTOMY  2007    [facility]; Dr. Suero; re: fibroids/menorrhagia       Pre-op Assessment    I have reviewed the Patient Summary Reports.     I have reviewed the Nursing Notes. I have reviewed the NPO Status.   I have reviewed the Medications.     Review of Systems  Anesthesia Hx:  Denies Family Hx of Anesthesia complications.   Denies Personal Hx of Anesthesia complications.   Hepatic/GI:   Bowel Prep.    Musculoskeletal:   Arthritis     Psych:   depression          Physical Exam  General: Well nourished, Cooperative, Alert and Oriented    Airway:  Mallampati: II   Mouth Opening: Normal  TM Distance: Normal  Tongue: Normal  Neck ROM: Normal ROM    Dental:  Intact        Anesthesia Plan  Type of Anesthesia, risks & benefits discussed:    Anesthesia Type: MAC  Intra-op Monitoring Plan: Standard ASA Monitors  Post Op Pain Control Plan: IV/PO Opioids PRN  Induction:  IV  Informed Consent: Informed consent signed with the Patient and all parties understand the risks and agree with anesthesia plan.  All questions answered.   ASA Score: 2    Ready For Surgery From Anesthesia Perspective.     .

## 2023-10-19 NOTE — H&P
PRE PROCEDURE H&P    Patient Name: Kaylan Lal  MRN: [id number]  : 1970  Date of Procedure:  10/19/2023  Referring Physician: Gladys Teixeira MD  Primary Physician: Jyothi Edwards MD  Procedure Physician: Gladys Teixeira MD       Planned Procedure: Colonoscopy and EGD  Diagnosis: iron deficiency anemia; abnormal ct  Chief Complaint: Same as above    HPI: Patient is an 53 y.o. female is here for the above.       Past Medical History:   Past Medical History:   Diagnosis Date    Abnormal uterine bleeding 2023    Discharging over a month    Anxiety     BRCA negative     per pt report    Depression     Fibroid         Past Surgical History:  Past Surgical History:   Procedure Laterality Date    HYSTERECTOMY      [facility]; Dr. Suero; re: fibroids/menorrhagia        Home Medications:  Prior to Admission medications    Medication Sig Start Date End Date Taking? Authorizing Provider   gabapentin (NEURONTIN) 300 MG capsule Take 1 capsule (300 mg total) by mouth 3 (three) times daily. 23 Yes Jyothi Edwards MD   hydrocortisone 2.5 % cream Aply qid prn hemorrhoid flaring 23  Yes Jyothi Edwards MD   levocetirizine (XYZAL) 5 MG tablet Take 5 mg by mouth every evening.   Yes Provider, Historical   metFORMIN (GLUCOPHAGE-XR) 500 MG ER 24hr tablet One tab poqd x1 week then increase to two tabs poqd 10/12/23  Yes Jyothi Edwards MD   dulaglutide (TRULICITY) 0.75 mg/0.5 mL pen injector Inject 0.75 mg into the skin every 7 days. 10/12/23 10/11/24  Jyothi Edwards MD   sodium,potassium,mag sulfates (SUPREP BOWEL PREP KIT) 17.5-3.13-1.6 gram SolR SMARTSI Milliliter(s) By Mouth Daily 10/9/23   Provider, Historical        Allergies:  Review of patient's allergies indicates:   Allergen Reactions    Hydrocodone-acetaminophen     Codeine Rash     Other reaction(s): Unknown    Hydrocodone Rash        Social History:   Social History     Socioeconomic History    Marital  "status: Single   Tobacco Use    Smoking status: Never    Smokeless tobacco: Never   Substance and Sexual Activity    Alcohol use: Never    Drug use: Never    Sexual activity: Not Currently     Partners: Male     Birth control/protection: Abstinence, Post-menopausal, See Surgical Hx       Family History:  Family History   Problem Relation Age of Onset    Heart failure Mother     Hypertension Mother     Diabetes Father     Breast cancer Sister     Cervical cancer Maternal Aunt        ROS: No acute cardiac events, no acute respiratory complaints.     Physical Exam (all patients):    /75 (BP Location: Left arm, Patient Position: Lying)   Pulse 75   Temp 97.7 °F (36.5 °C) (Temporal)   Resp 18   Ht 5' 9" (1.753 m)   Wt 92.1 kg (203 lb)   SpO2 100%   Breastfeeding No   BMI 29.98 kg/m²   Lungs: Clear to auscultation bilaterally, respirations unlabored  Heart: Regular rate and rhythm, S1 and S2 normal, no obvious murmurs  Abdomen:         Soft, non-tender, bowel sounds normal, no masses, no organomegaly    Lab Results   Component Value Date    WBC 6.7 08/31/2023    MCV 83 08/31/2023    RDW 14.1 08/31/2023     08/31/2023     (H) 09/16/2023    HGBA1C 6.4 (H) 09/16/2023    BUN 7 08/31/2023     08/31/2023    K 4.7 08/31/2023     08/31/2023        SEDATION PLAN: per anesthesia      History reviewed, vital signs satisfactory, cardiopulmonary status satisfactory, sedation options, risks and plans have been discussed with the patient  All their questions were answered and the patient agrees to the sedation procedures as planned and the patient is deemed an appropriate candidate for the sedation as planned.    Procedure explained to patient, informed consent obtained and placed in chart.    Gladys Teixeira  10/19/2023  1:36 PM    "

## 2023-10-19 NOTE — PLAN OF CARE
Dr Teixeira came to bedside and discussed findings. NO N/V,  no abdominal pain, no GI bleeding, and vitals stable.  Pt discharged from unit.

## 2023-10-19 NOTE — [employer] PATIENT INSTRUCTIONS
Discharge Summary/Instructions after an Endoscopic Procedure  Patient Name: Kaylan Lal  Patient MRN: [id number]  Patient YOB: 1970 Thursday, October 19, 2023 Gladys Teixeira MD  Dear patient,  As a result of recent federal legislation (The Federal Cures Act), you may   receive lab or pathology results from your procedure in your [facility]   account before your physician is able to contact you. Your physician or   their representative will relay the results to you with their   recommendations at their soonest availability.  Thank you,  RESTRICTIONS:  During your procedure today, you received medications for sedation.  These   medications may affect your judgment, balance and coordination.  Therefore,   for 24 hours, you have the following restrictions:   - DO NOT drive a car, operate machinery, make legal/financial decisions,   sign important papers or drink alcohol.    ACTIVITY:  Today: no heavy lifting, straining or running due to procedural   sedation/anesthesia.  The following day: return to full activity including work.  DIET:  Eat and drink normally unless instructed otherwise.     TREATMENT FOR COMMON SIDE EFFECTS:  - Mild abdominal pain, nausea, belching, bloating or excessive gas:  rest,   eat lightly and use a heating pad.  - Sore Throat: treat with throat lozenges and/or gargle with warm salt   water.  - Because air was used during the procedure, expelling large amounts of air   from your rectum or belching is normal.  - If a bowel prep was taken, you may not have a bowel movement for 1-3 days.    This is normal.  SYMPTOMS TO WATCH FOR AND REPORT TO YOUR PHYSICIAN:  1. Abdominal pain or bloating, other than gas cramps.  2. Chest pain.  3. Back pain.  4. Signs of infection such as: chills or fever occurring within 24 hours   after the procedure.  5. Rectal bleeding, which would show as bright red, maroon, or black stools.   (A tablespoon of blood from the rectum is not serious, especially  if   hemorrhoids are present.)  6. Vomiting.  7. Weakness or dizziness.  GO DIRECTLY TO THE NEAREST EMERGENCY ROOM IF YOU HAVE ANY OF THE FOLLOWING:      Difficulty breathing              Chills and/or fever over 101 F   Persistent vomiting and/or vomiting blood   Severe abdominal pain   Severe chest pain   Black, tarry stools   Bleeding- more than one tablespoon   Any other symptom or condition that you feel may need urgent attention  Your doctor recommends these additional instructions:  If any biopsies were taken, your doctors clinic will contact you in 1 to 2   weeks with any results.  - Patient has a contact number available for emergencies.  The signs and   symptoms of potential delayed complications were discussed with the   patient.  Return to normal activities tomorrow.  Written discharge   instructions were provided to the patient.   - Discharge patient to home (via wheelchair).   - Resume previous diet today.   - Continue present medications.   - Await pathology results.  For questions, problems or results please call your physician Gladys Teixeira MD at Work:  [phone]  If you have any questions about the above instructions, call the GI   department at [phone] or call the endoscopy unit at [phone]   from 7am until 3 pm.  [facility] [place], EMERGENCY ROOM PHONE NUMBER:   [phone]  IF A COMPLICATION OR EMERGENCY SITUATION ARISES AND YOU ARE UNABLE TO REACH   YOUR PHYSICIAN - GO DIRECTLY TO THE EMERGENCY ROOM.  I have read or have had read to me these discharge instructions for my   procedure and have received a written copy.  I understand these   instructions and will follow-up with my physician if I have any questions.     __________________________________       _____________________________________  Nurse Signature                                          Patient/Designated   Responsible Party Signature  MD Gladys Jaffe MD  10/19/2023  2:25:06 PM  This report has been verified and signed electronically.  Dear patient,  As a result of recent federal legislation (The Federal Cures Act), you may   receive lab or pathology results from your procedure in your [facility]   account before your physician is able to contact you. Your physician or   their representative will relay the results to you with their   recommendations at their soonest availability.  Thank you,  [employer]ATION

## 2023-10-19 NOTE — ANESTHESIA POSTPROCEDURE EVALUATION
Anesthesia Post Evaluation    Patient: Kaylan Lal    Procedure(s) Performed: Procedure(s) (LRB):  COLONOSCOPY (N/A)  EGD (ESOPHAGOGASTRODUODENOSCOPY) (N/A)    Final Anesthesia Type: MAC      Patient location during evaluation: PACU  Patient participation: Yes- Able to Participate  Level of consciousness: awake  Post-procedure vital signs: reviewed and stable  Pain management: adequate  Airway patency: patent    PONV status at discharge: No PONV  Anesthetic complications: no      Cardiovascular status: hemodynamically stable  Respiratory status: unassisted  Hydration status: euvolemic  Follow-up not needed.          Vitals Value Taken Time   /58 10/19/23 1416   Temp 36.6 °C (97.8 °F) 10/19/23 1416   Pulse 80 10/19/23 1426   Resp 18 10/19/23 1426   SpO2 100 % 10/19/23 1426         Event Time   Out of Recovery 14:34:14         Pain/Mary Ann Score: Mary Ann Score: 10 (10/19/2023  2:26 PM)

## 2023-10-19 NOTE — PROVATION PATIENT INSTRUCTIONS
Discharge Summary/Instructions after an Endoscopic Procedure  Patient Name: Kaylan Lal  Patient MRN: [id number]  Patient YOB: 1970 Thursday, October 19, 2023 Gladys Teixeira MD  Dear patient,  As a result of recent federal legislation (The Federal Cures Act), you may   receive lab or pathology results from your procedure in your [facility]   account before your physician is able to contact you. Your physician or   their representative will relay the results to you with their   recommendations at their soonest availability.  Thank you,  RESTRICTIONS:  During your procedure today, you received medications for sedation.  These   medications may affect your judgment, balance and coordination.  Therefore,   for 24 hours, you have the following restrictions:   - DO NOT drive a car, operate machinery, make legal/financial decisions,   sign important papers or drink alcohol.    ACTIVITY:  Today: no heavy lifting, straining or running due to procedural   sedation/anesthesia.  The following day: return to full activity including work.  DIET:  Eat and drink normally unless instructed otherwise.     TREATMENT FOR COMMON SIDE EFFECTS:  - Mild abdominal pain, nausea, belching, bloating or excessive gas:  rest,   eat lightly and use a heating pad.  - Sore Throat: treat with throat lozenges and/or gargle with warm salt   water.  - Because air was used during the procedure, expelling large amounts of air   from your rectum or belching is normal.  - If a bowel prep was taken, you may not have a bowel movement for 1-3 days.    This is normal.  SYMPTOMS TO WATCH FOR AND REPORT TO YOUR PHYSICIAN:  1. Abdominal pain or bloating, other than gas cramps.  2. Chest pain.  3. Back pain.  4. Signs of infection such as: chills or fever occurring within 24 hours   after the procedure.  5. Rectal bleeding, which would show as bright red, maroon, or black stools.   (A tablespoon of blood from the rectum is not serious, especially  if   hemorrhoids are present.)  6. Vomiting.  7. Weakness or dizziness.  GO DIRECTLY TO THE NEAREST EMERGENCY ROOM IF YOU HAVE ANY OF THE FOLLOWING:      Difficulty breathing              Chills and/or fever over 101 F   Persistent vomiting and/or vomiting blood   Severe abdominal pain   Severe chest pain   Black, tarry stools   Bleeding- more than one tablespoon   Any other symptom or condition that you feel may need urgent attention  Your doctor recommends these additional instructions:  If any biopsies were taken, your doctors clinic will contact you in 1 to 2   weeks with any results.  - Patient has a contact number available for emergencies.  The signs and   symptoms of potential delayed complications were discussed with the   patient.  Return to normal activities tomorrow.  Written discharge   instructions were provided to the patient.   - Discharge patient to home (via wheelchair).   - Resume previous diet today.   - Continue present medications.   - Await pathology results.   - Refer to a colo-rectal surgeon.   - Repeat colonoscopy for surveillance based on pathology results.  For questions, problems or results please call your physician Gladys Teixeira MD at Work:  [phone]  If you have any questions about the above instructions, call the GI   department at [phone] or call the endoscopy unit at [phone]   from 7am until 3 pm.  [facility], EMERGENCY ROOM PHONE NUMBER:   [phone]  IF A COMPLICATION OR EMERGENCY SITUATION ARISES AND YOU ARE UNABLE TO REACH   YOUR PHYSICIAN - GO DIRECTLY TO THE EMERGENCY ROOM.  I have read or have had read to me these discharge instructions for my   procedure and have received a written copy.  I understand these   instructions and will follow-up with my physician if I have any questions.     __________________________________       _____________________________________  Nurse Signature                                           Patient/Designated   Responsible Party Signature  MD Gladys Jaffe MD  10/19/2023 2:22:00 PM  This report has been verified and signed electronically.  Dear patient,  As a result of recent federal legislation (The Federal Cures Act), you may   receive lab or pathology results from your procedure in your [facility]   account before your physician is able to contact you. Your physician or   their representative will relay the results to you with their   recommendations at their soonest availability.  Thank you,  [employer]OVATION

## 2023-10-20 ENCOUNTER — PATIENT MESSAGE (OUTPATIENT)
Dept: GASTROENTEROLOGY | Facility: CLINIC | Age: 53
End: 2023-10-20
Payer: COMMERCIAL

## 2023-10-20 VITALS
WEIGHT: 203 LBS | HEIGHT: 69 IN | OXYGEN SATURATION: 100 % | SYSTOLIC BLOOD PRESSURE: 103 MMHG | HEART RATE: 80 BPM | RESPIRATION RATE: 18 BRPM | TEMPERATURE: 98 F | BODY MASS INDEX: 30.07 KG/M2 | DIASTOLIC BLOOD PRESSURE: 58 MMHG

## 2023-10-23 ENCOUNTER — TELEPHONE (OUTPATIENT)
Dept: SURGERY | Facility: CLINIC | Age: 53
End: 2023-10-23
Payer: COMMERCIAL

## 2023-10-23 NOTE — TELEPHONE ENCOUNTER
Left multiple messages for patient with direct call back number to schedule appointment.     ----- Message from Gladys Teixeira MD sent at 10/19/2023  2:26 PM CDT -----  Referral placed for colorectal cancer.  She was admitted in the summer with CT findings that were worrisome but came in with a contained perf.  I talked to surgery on at the time and plan was to scope her within 1-2 weeks because of malignancy concern (see my note from June).  Not sure what happened but lost to follow up essentially.  She has since been seen by gyn, who did vaginal exam and found mass.  Path from vagina shows adenocarcinoma.  Scoped her today and about a 10 cm in length rectal/rectosigmoid mass.

## 2023-10-27 ENCOUNTER — PATIENT MESSAGE (OUTPATIENT)
Dept: GASTROENTEROLOGY | Facility: HOSPITAL | Age: 53
End: 2023-10-27
Payer: COMMERCIAL

## 2023-10-27 ENCOUNTER — TELEPHONE (OUTPATIENT)
Dept: SURGERY | Facility: CLINIC | Age: 53
End: 2023-10-27
Payer: COMMERCIAL

## 2023-10-27 NOTE — TELEPHONE ENCOUNTER
Attempted to reach patient again to offer patient appointment for Monday 10/30. Unable to reach patient. LVM with direct contact number. When spoke to patient earlier this week she did not wish to schedule appointment with Dr. Roach until pathology resulted. Patient was a little hesitant. Calling patient again to follow up since now path is back. Will attempt again on Monday.

## 2023-10-30 ENCOUNTER — TELEPHONE (OUTPATIENT)
Dept: SURGERY | Facility: CLINIC | Age: 53
End: 2023-10-30
Payer: COMMERCIAL

## 2023-10-30 PROBLEM — K64.4 EXTERNAL HEMORRHOID, BLEEDING: Status: RESOLVED | Noted: 2023-07-28 | Resolved: 2023-10-30

## 2023-10-30 LAB
FINAL PATHOLOGIC DIAGNOSIS: ABNORMAL
GROSS: ABNORMAL
Lab: ABNORMAL
SUPPLEMENTAL DIAGNOSIS: ABNORMAL

## 2023-10-30 NOTE — TELEPHONE ENCOUNTER
Attempted to contact patient. Lvm with direct call back number. Spoke with patient on Friday 10/27. Patient stated she would call me back that afternoon because she was busy with work. Called this morning to see patient availability.

## 2023-10-31 ENCOUNTER — TELEPHONE (OUTPATIENT)
Dept: SURGERY | Facility: CLINIC | Age: 53
End: 2023-10-31
Payer: COMMERCIAL

## 2023-10-31 ENCOUNTER — PATIENT MESSAGE (OUTPATIENT)
Dept: SURGERY | Facility: CLINIC | Age: 53
End: 2023-10-31
Payer: COMMERCIAL

## 2023-10-31 NOTE — TELEPHONE ENCOUNTER
Attempted to reach patient again. Left voicemail for patient with direct call back number.     ----- Message from Loy Roach MD sent at 10/31/2023  1:54 PM CDT -----  Path back now. Needs to be seen MARILIN Mg    ----- Message -----  From: Erika Shields RN  Sent: 10/25/2023   3:34 PM CDT  To: Loy Roach MD    Patient does not wish to be scheduled until her path comes back. I spoke with her yesterday. Thanks!  ----- Message -----  From: Loy Roach MD  Sent: 10/25/2023   3:27 PM CDT  To: Erika Shields, RN    Lets get her in to see me MARILIN Mg    ----- Message -----  From: Gladys Teixeira MD  Sent: 10/19/2023   2:29 PM CDT  To: Loy Roach MD; [facility] Staff    Referral placed for colorectal cancer.  She was admitted in the summer with CT findings that were worrisome but came in with a contained perf.  I talked to surgery on at the time and plan was to scope her within 1-2 weeks because of malignancy concern (see my note from June).  Not sure what happened but lost to follow up essentially.  She has since been seen by gyn, who did vaginal exam and found mass.  Path from vagina shows adenocarcinoma.  Scoped her today and about a 10 cm in length rectal/rectosigmoid mass.

## 2023-11-06 ENCOUNTER — OFFICE VISIT (OUTPATIENT)
Dept: SURGERY | Facility: CLINIC | Age: 53
End: 2023-11-06
Payer: COMMERCIAL

## 2023-11-06 VITALS
DIASTOLIC BLOOD PRESSURE: 80 MMHG | BODY MASS INDEX: 29.98 KG/M2 | OXYGEN SATURATION: 98 % | RESPIRATION RATE: 19 BRPM | WEIGHT: 203 LBS | SYSTOLIC BLOOD PRESSURE: 122 MMHG | HEART RATE: 104 BPM

## 2023-11-06 DIAGNOSIS — C20 RECTAL ADENOCARCINOMA: Primary | ICD-10-CM

## 2023-11-06 PROCEDURE — 99205 OFFICE O/P NEW HI 60 MIN: CPT | Mod: S$GLB,,, | Performed by: COLON & RECTAL SURGERY

## 2023-11-06 PROCEDURE — 99205 PR OFFICE/OUTPT VISIT, NEW, LEVL V, 60-74 MIN: ICD-10-PCS | Mod: S$GLB,,, | Performed by: COLON & RECTAL SURGERY

## 2023-11-06 PROCEDURE — 99999 PR PBB SHADOW E&M-EST. PATIENT-LVL III: ICD-10-PCS | Mod: PBBFAC,,, | Performed by: COLON & RECTAL SURGERY

## 2023-11-06 PROCEDURE — 3074F SYST BP LT 130 MM HG: CPT | Mod: CPTII,S$GLB,, | Performed by: COLON & RECTAL SURGERY

## 2023-11-06 PROCEDURE — 3008F PR BODY MASS INDEX (BMI) DOCUMENTED: ICD-10-PCS | Mod: CPTII,S$GLB,, | Performed by: COLON & RECTAL SURGERY

## 2023-11-06 PROCEDURE — 3079F PR MOST RECENT DIASTOLIC BLOOD PRESSURE 80-89 MM HG: ICD-10-PCS | Mod: CPTII,S$GLB,, | Performed by: COLON & RECTAL SURGERY

## 2023-11-06 PROCEDURE — 1159F MED LIST DOCD IN RCRD: CPT | Mod: CPTII,S$GLB,, | Performed by: COLON & RECTAL SURGERY

## 2023-11-06 PROCEDURE — 3044F PR MOST RECENT HEMOGLOBIN A1C LEVEL <7.0%: ICD-10-PCS | Mod: CPTII,S$GLB,, | Performed by: COLON & RECTAL SURGERY

## 2023-11-06 PROCEDURE — 3074F PR MOST RECENT SYSTOLIC BLOOD PRESSURE < 130 MM HG: ICD-10-PCS | Mod: CPTII,S$GLB,, | Performed by: COLON & RECTAL SURGERY

## 2023-11-06 PROCEDURE — 3079F DIAST BP 80-89 MM HG: CPT | Mod: CPTII,S$GLB,, | Performed by: COLON & RECTAL SURGERY

## 2023-11-06 PROCEDURE — 3008F BODY MASS INDEX DOCD: CPT | Mod: CPTII,S$GLB,, | Performed by: COLON & RECTAL SURGERY

## 2023-11-06 PROCEDURE — 3044F HG A1C LEVEL LT 7.0%: CPT | Mod: CPTII,S$GLB,, | Performed by: COLON & RECTAL SURGERY

## 2023-11-06 PROCEDURE — 99999 PR PBB SHADOW E&M-EST. PATIENT-LVL III: CPT | Mod: PBBFAC,,, | Performed by: COLON & RECTAL SURGERY

## 2023-11-06 PROCEDURE — 1159F PR MEDICATION LIST DOCUMENTED IN MEDICAL RECORD: ICD-10-PCS | Mod: CPTII,S$GLB,, | Performed by: COLON & RECTAL SURGERY

## 2023-11-06 NOTE — PROGRESS NOTES
History & Physical    SUBJECTIVE:     Chief Complaint   Patient presents with    Rectal Cancer   Ref: Dr. Teixeira    History of Present Illness:  Patient is a 53 y.o. female presents for evaluation of rectal cancer.  Patient was undergoing her 1st ever colonoscopy on 2023 where a malignant-appearing rectal mass was seen in the proximal rectum and rectosigmoid area with biopsies confirming adenocarcinoma.  Prior to this, the patient had had a vaginal mass seen on gyn exam earlier in 2023 with biopsies confirming adenocarcinoma that was likely colorectal in origin.  She would had a previous CT scan in 2023 confirming a possible perforation/abscess near the rectum and vagina.  She states she is had intermittent rectal bleeding and rectal pain.  Denies any current fever, chills, nausea, vomiting.  Has had around a 20 lb weight loss over the past year.  She is not on any anticoagulation.  Only past surgical history is a hysterectomy and tubal ligation.  Has no family history of colorectal cancer.  CEA level in 2023 was 97.      Review of patient's allergies indicates:   Allergen Reactions    Hydrocodone-acetaminophen     Codeine Rash     Other reaction(s): Unknown    Hydrocodone Rash       Current Outpatient Medications   Medication Sig Dispense Refill    dulaglutide (TRULICITY) 0.75 mg/0.5 mL pen injector Inject 0.75 mg into the skin every 7 days. 4 pen 3    gabapentin (NEURONTIN) 300 MG capsule Take 1 capsule (300 mg total) by mouth 3 (three) times daily. 90 capsule 5    hydrocortisone 2.5 % cream Aply qid prn hemorrhoid flaring 28 g 3    levocetirizine (XYZAL) 5 MG tablet Take 5 mg by mouth every evening.      metFORMIN (GLUCOPHAGE-XR) 500 MG ER 24hr tablet One tab poqd x1 week then increase to two tabs poqd 180 tablet 1    sodium,potassium,mag sulfates (SUPREP BOWEL PREP KIT) 17.5-3.13-1.6 gram SolR SMARTSI Milliliter(s) By Mouth Daily       No current facility-administered  medications for this visit.       Past Medical History:   Diagnosis Date    Abnormal uterine bleeding August 2023    Discharging over a month    Anxiety 2021    BRCA negative     per pt report    Depression 2021    Fibroid 2007     Past Surgical History:   Procedure Laterality Date    COLONOSCOPY N/A 10/19/2023    Procedure: COLONOSCOPY;  Surgeon: Gladys Teixeira MD;  Location: [facility] ENDO;  Service: Endoscopy;  Laterality: N/A;    ESOPHAGOGASTRODUODENOSCOPY N/A 10/19/2023    Procedure: EGD (ESOPHAGOGASTRODUODENOSCOPY);  Surgeon: Gladys Teixeira MD;  Location: [facility] ENDO;  Service: Endoscopy;  Laterality: N/A;    HYSTERECTOMY  2007    [facility]; Dr. Suero; re: fibroids/menorrhagia     Family History   Problem Relation Age of Onset    Heart failure Mother     Hypertension Mother     Diabetes Father     Breast cancer Sister     Cervical cancer Maternal Aunt      Social History     Tobacco Use    Smoking status: Never    Smokeless tobacco: Never   Substance Use Topics    Alcohol use: Never    Drug use: Never        Review of Systems:  Review of Systems   Constitutional:  Positive for unexpected weight change. Negative for activity change, appetite change, chills, fatigue and fever.   HENT:  Negative for congestion, ear pain, sore throat and trouble swallowing.    Eyes:  Negative for pain, redness and itching.   Respiratory:  Negative for cough, shortness of breath and wheezing.    Cardiovascular:  Negative for chest pain, palpitations and leg swelling.   Gastrointestinal:  Positive for anal bleeding, blood in stool and rectal pain. Negative for abdominal distention, abdominal pain, diarrhea, nausea and vomiting.   Endocrine: Negative for cold intolerance, heat intolerance and polyuria.   Genitourinary:  Positive for vaginal discharge and vaginal pain. Negative for dysuria, flank pain, frequency and hematuria.   Musculoskeletal:  Negative for gait problem, joint swelling and neck pain.   Skin:  Negative for color change,  rash and wound.   Allergic/Immunologic: Negative for environmental allergies and immunocompromised state.   Neurological:  Negative for dizziness, speech difficulty, weakness and numbness.   Psychiatric/Behavioral:  Negative for agitation, confusion and hallucinations.        OBJECTIVE:     Vital Signs (Most Recent)  Pulse: 104 (11/06/23 1255)  Resp: 19 (11/06/23 1255)  BP: 122/80 (11/06/23 1255)  SpO2: 98 % (11/06/23 1255)     92.1 kg (203 lb)     Physical Exam:  Physical Exam  Exam conducted with a chaperone present.   Constitutional:       Appearance: She is well-developed.   HENT:      Head: Normocephalic and atraumatic.   Eyes:      Conjunctiva/sclera: Conjunctivae normal.   Neck:      Thyroid: No thyromegaly.   Cardiovascular:      Rate and Rhythm: Normal rate and regular rhythm.   Pulmonary:      Effort: Pulmonary effort is normal. No respiratory distress.   Abdominal:      General: There is no distension.      Palpations: Abdomen is soft. There is no mass.      Tenderness: There is no abdominal tenderness.   Genitourinary:     Comments: Anorectal: external hemorrhoids which are soft, nontender, nonthrombosed; ANDREW with good tone, no blood, no palpable rectal mass; palpable firmness in proximal vagina through rectum    Vaginal: +mass at the vaginal cuff which is tender  Musculoskeletal:         General: No tenderness. Normal range of motion.      Cervical back: Normal range of motion.   Skin:     General: Skin is warm and dry.      Capillary Refill: Capillary refill takes less than 2 seconds.      Findings: No rash.   Neurological:      Mental Status: She is alert and oriented to person, place, and time.       Anoscopy Procedure Note    Pre-procedure diagnosis: Rectal mass    Post-procedure diagnosis:  Rectal mass    Procedure: Anoscopy    Surgeon: Loy Roach MD    Assistant: Erika Shields, JESS    Specimen: none    Findings:  Anoscope inserted and all 4 quadrants examined.  No definitive mass.  Mildly  enlarged internal hemorrhoids without evidence of active bleeding.    Patient tolerated procedure well.      Laboratory  Lab Results   Component Value Date    WBC 6.7 08/31/2023    HGB 13.4 08/31/2023    HCT 42.8 08/31/2023     08/31/2023    CHOL 204 (H) 08/31/2023    TRIG 58 08/31/2023    HDL 69 08/31/2023    ALT 20 08/31/2023    AST 19 08/31/2023     08/31/2023    K 4.7 08/31/2023     08/31/2023    CREATININE 0.86 08/31/2023    BUN 7 08/31/2023    CO2 24 08/31/2023    TSH 0.722 08/31/2023    HGBA1C 6.4 (H) 09/16/2023     Component Ref Range & Units 1 mo ago   CEA 0 - 5.0 ng/ml 97.28 Abnormal         Diagnostic Results:  Colonoscopy: reviewed      PATHOLOGY:  Rectal mass (biopsy):   Invasive adenocarcinoma, well differentiated   Background low and high-grade glandular dysplasia.     Supplemental: The diagnosis remains same.  This supplemental is issued to report the findings of additional immunostains performed with appropriate controls.     Immunohistochemistry (IHC) Testing for Mismatch Repair (MMR) Proteins:   MLH1, MSH2, MSH6, PMS2 - Intact nuclear expression   Background nonneoplastic tissue/internal control with intact nuclear expression     There are exceptions to the above IHC interpretations. These results should not be considered in isolation, and clinical correlation with genetic counseling is recommended to assess the need for germline testing.     Interpretation: No loss of nuclear expression of MMR proteins: low probability of microsatellite instability     CK7:  Negative   CK20 positive   CDX2:  Positive        FINAL PATHOLOGIC DIAGNOSIS   VAGINAL MASS, BIOPSY:   Positive for adenocarcinoma with features consistent with metastatic   colorectal carcinoma       ASSESSMENT/PLAN:     52yo F with likely locally advanced rectal adenocarcinoma invading vagina    - Long discussion with the patient regarding the diagnosis of locally advanced rectal adenocarcinoma.  We discussed that the  patient would need to complete staging prior to any determination for therapy.  Discussed that this will likely include discussion at tumor board and the likely recommendation of neoadjuvant therapy with a combination of radiation and chemotherapy.  She voiced understanding of this and is agreeable proceed.  - MRI rectal cancer protocol ordered  - CT C/A/P ordered, eval for metastatic disease  - Rectal Cancer Tumor Board presentation  - RTC 3 weeks    Loy Roach MD  Colon and Rectal Surgery  [facility]

## 2023-11-10 ENCOUNTER — HOSPITAL ENCOUNTER (OUTPATIENT)
Dept: RADIOLOGY | Facility: HOSPITAL | Age: 53
Discharge: HOME | End: 2023-11-10
Attending: COLON & RECTAL SURGERY
Payer: COMMERCIAL

## 2023-11-10 DIAGNOSIS — C20 RECTAL ADENOCARCINOMA: ICD-10-CM

## 2023-11-10 PROCEDURE — 71260 CT THORAX DX C+: CPT | Mod: 26,,, | Performed by: RADIOLOGY

## 2023-11-10 PROCEDURE — A9698 NON-RAD CONTRAST MATERIALNOC: HCPCS | Mod: PN | Performed by: COLON & RECTAL SURGERY

## 2023-11-10 PROCEDURE — 74177 CT ABD & PELVIS W/CONTRAST: CPT | Mod: TC,PN

## 2023-11-10 PROCEDURE — 71260 CT CHEST ABDOMEN PELVIS WITH IV CONTRAST (XPD): ICD-10-PCS | Mod: 26,,, | Performed by: RADIOLOGY

## 2023-11-10 PROCEDURE — 74177 CT CHEST ABDOMEN PELVIS WITH IV CONTRAST (XPD): ICD-10-PCS | Mod: 26,,, | Performed by: RADIOLOGY

## 2023-11-10 PROCEDURE — 25500020 PHARM REV CODE 255: Mod: PN | Performed by: COLON & RECTAL SURGERY

## 2023-11-10 PROCEDURE — 71260 CT THORAX DX C+: CPT | Mod: TC,PN

## 2023-11-10 PROCEDURE — 72195 MRI PELVIS W/O DYE: CPT | Mod: 26,,, | Performed by: RADIOLOGY

## 2023-11-10 PROCEDURE — 74177 CT ABD & PELVIS W/CONTRAST: CPT | Mod: 26,,, | Performed by: RADIOLOGY

## 2023-11-10 PROCEDURE — 72195 MRI RECTAL CANCER WITHOUT CONTRAST: ICD-10-PCS | Mod: 26,,, | Performed by: RADIOLOGY

## 2023-11-10 PROCEDURE — 72195 MRI PELVIS W/O DYE: CPT | Mod: TC,PN

## 2023-11-10 RX ADMIN — IOHEXOL 1000 ML: 12 SOLUTION ORAL at 02:11

## 2023-11-10 RX ADMIN — IOHEXOL 100 ML: 350 INJECTION, SOLUTION INTRAVENOUS at 02:11

## 2023-11-16 DIAGNOSIS — C18.7 ADENOCARCINOMA OF SIGMOID COLON: Primary | ICD-10-CM

## 2023-11-17 ENCOUNTER — TELEPHONE (OUTPATIENT)
Dept: HEMATOLOGY/ONCOLOGY | Facility: CLINIC | Age: 53
End: 2023-11-17
Payer: COMMERCIAL

## 2023-11-17 NOTE — NURSING
1120am: Outgoing call to pt regarding WQ referral request per Dr. Roach for rectal cancer. Pt didn't answer. LVM.   Oncology Navigation   Intake  Date of Diagnosis: 10/19/23  Cancer Type: GI (rectal)  Internal / External Referral: Internal (Dr. Loy Roach)  Date of Referral: 11/16/23  Initial Nurse Navigator Contact: 11/17/23  Referral to Initial Contact Timeline (days): 1  Date Worked: 11/17/23     Treatment                              Acuity      Follow Up  No follow-ups on file.

## 2023-12-01 ENCOUNTER — TELEPHONE (OUTPATIENT)
Dept: HEMATOLOGY/ONCOLOGY | Facility: CLINIC | Age: 53
End: 2023-12-01
Payer: COMMERCIAL

## 2023-12-01 NOTE — NURSING
1527 pm: Outgoing call to pt regarding WQ referral request per Dr. Roach from 11/17 for rectal cancer. Pt didn't answer. LVM.    Oncology Navigation   Intake  Date of Diagnosis: 10/19/23  Cancer Type: GI (rectal)  Internal / External Referral: Internal (Dr. Loy Roach)  Date of Referral: 11/16/23  Initial Nurse Navigator Contact: 11/17/23  Referral to Initial Contact Timeline (days): 1  Date Worked: 12/01/23     Treatment                              Acuity      Follow Up  No follow-ups on file.

## 2023-12-13 ENCOUNTER — PATIENT MESSAGE (OUTPATIENT)
Dept: HEMATOLOGY/ONCOLOGY | Facility: CLINIC | Age: 53
End: 2023-12-13
Payer: COMMERCIAL

## 2024-07-25 ENCOUNTER — CLINICAL SUPPORT (OUTPATIENT)
Dept: DIABETES | Facility: CLINIC | Age: 54
End: 2024-07-25
Payer: COMMERCIAL

## 2024-07-25 ENCOUNTER — PATIENT MESSAGE (OUTPATIENT)
Dept: DIABETES | Facility: CLINIC | Age: 54
End: 2024-07-25

## 2024-07-25 DIAGNOSIS — E11.9 TYPE 2 DIABETES MELLITUS WITHOUT COMPLICATION, WITHOUT LONG-TERM CURRENT USE OF INSULIN: ICD-10-CM

## 2024-07-25 PROCEDURE — G0108 DIAB MANAGE TRN  PER INDIV: HCPCS | Mod: 95,,, | Performed by: PEDIATRICS

## 2024-07-25 NOTE — PROGRESS NOTES
Diabetes Care Specialist Progress Note  Author: Drea Vila RN  Date: 7/25/2024    Intake    Program Intake  Reason for Diabetes Program Visit:: Initial Diabetes Assessment  In the last 12 months, have you:: none  Permission to speak with others about care:: no    Current Diabetes Treatment: Diet/Exercise    Continuous Glucose Monitoring  Patient has CGM: No    Lab Results   Component Value Date    HGBA1C 6.5 (H) 06/26/2024     Diabetes Care Specialist Virtual Visit Note   The patient location is: [place]  The chief complaint leading to consultation is: Diabetes  Visit type: Audiovisual  Total time spent with patient: 30 min   Each patient to whom he or she provides medical services by telemedicine is:  (1) informed of the relationship between the physician and patient and the respective role of any other health care provider with respect to management of the patient; and (2) notified that he or she may decline to receive medical services by telemedicine and may withdraw from such care at any time.    Lifestyle Coping Support & Clinical    Lifestyle/Coping/Support  Compared to other people your age, how would you rate your health?: Good  List anything about Diabetes that causes you stress?: Currently going through chemo for colon cancer. MD states BGs are elevated due the the treatments.  How do you deal with stress/distress?: Learn about what to eat and hope A1C goes down after treatment is completed.  Learning Barriers:: None  Culture or Nondenominational beliefs that may impact ability to access healthcare: No  Psychosocial/Coping Skills Assessment Completed: : Yes  Assessment indicates:: Adequate understanding  Area of need?: No    Problem Review  Active Comorbidities: Cancer (Colon cancer and still on chemo)    Diabetes Self-Management Skills Assessment    Medication Skills Assessment  Patient is able to identify current diabetes medications, dosages, and appropriate timing of medications.:  (Not taking  medications.)  Medication Skills Assessment Completed:: Yes  Assessment indicates:: Adequate understanding  Area of need?: No    Diabetes Disease Process/Treatment Options  Diabetes Type?: Type II  When were you diagnosed?: July 2024  If previous diabetes education, when/where:: N/A  What are your goals for this education session?: Learn what to eat.  Is patient aware of what causes diabetes?: No  Does patient understand the pathophysiology of diabetes?: No  Diabetes Disease Process/Treatment Options: Skills Assessment Completed: Yes  Assessment indicates:: Knowledge deficit  Area of need?: Yes    Nutrition/Healthy Eating  Meal Plan 24 Hour Recall - Breakfast: None.  Meal Plan 24 Hour Recall - Lunch: Frozen power bowls: Chicken mediterranean with granola bar; Water  Meal Plan 24 Hour Recall - Dinner: Chicken gumbo with carine rice;  Meal Plan 24 Hour Recall - Snack: Apple or plum or orange  Meal Plan 24 Hour Recall - Beverage: Ice drinks. Water.  Who shops/cooks?: Self.  Patient can identify foods that impact blood sugar.: yes (Chips.)  Nutrition/Healthy Eating Skills Assessment Completed:: Yes  Assessment indicates:: Knowledge deficit, Instruction Needed  Area of need?: Yes    Physical Activity/Exercise  Patient's daily activity level:: moderately active  Patient formally exercises outside of work.: yes  Frequency: four or more times a week  Patient can identify forms of physical activity.: yes  Physical Activity/Exercise Skills Assessment Completed: : Yes  Assessment indicates:: Adequate understanding  Area of need?: No    Home Blood Glucose Monitoring  Patient states that blood sugar is checked at home daily.: no  What is your A1c Target?: <6.5%  Home Blood Glucose Monitoring Skills Assessment Completed: : Yes  Assessment indicates:: Knowledge deficit, Instruction Needed  Area of need?: Yes    Acute Complications  Acute Complications Skills Assessment Completed: : No  Deffered due to:: Time  Area of need?:  No    Chronic Complications  Chronic Complications Skills Assessment Completed: : No  Deferred due to:: Time  Area of need?: Deferred      Assessment Summary and Plan    Based on today's diabetes care assessment, the following areas of need were identified:          7/25/2024    12:01 AM   Areas of Need   Medications/Current Diabetes Treatment No-Not taking medications at this time. Hoping changes in eating habits plus discontinuation of chemo will help with A1C.      Lifestyle Coping Support No.     Diabetes Disease Process/Treatment Options Yes-Educated on how carbs affect blood sugar.     Nutrition/Healthy Eating Yes-See care plan.     Physical Activity/Exercise No-Ms. Lal is a part of the [employer]. She walks on a treadmill 3-5x/week for 30-60 minutes.   Educated on the benefit of physical activity and blood sugar.     Home Blood Glucose Monitoring Yes-See care plan.     Acute Complications No.     Chronic Complications Deferred.         Today's interventions were provided through individual discussion, instruction, and written materials were provided.      Patient verbalized understanding of instruction and written materials.  Pt was able to return back demonstration of instructions today. Patient understood key points, needs reinforcement and further instruction.     Diabetes Self-Management Care Plan:    Today's Diabetes Self-Management Care Plan was developed with Kaylan's input. Kaylan has agreed to work toward the following goal(s) to improve his/her overall diabetes control.      Care Plan: Diabetes Management   Updates made since 6/25/2024 12:00 AM        Problem: Healthy Eating         Goal: Eat 3 meals daily with 30-45 grams of carbs/meal and 0-15 grams of carbs/snack.    Start Date: 7/25/2024   Expected End Date: 7/10/2025   Priority: High   Barriers: No Barriers Identified   Note:    Educated on importance of balancing meals that include veggies, protein, and carb.   Educated on  importance of eating breakfast and provided breakfast ideas.   Most of her dinners are frozen power bowls which fit the recommendation.        Task: Reviewed the sources and role of Carbohydrate, Protein, and Fat and how each nutrient impacts blood sugar. Completed 7/25/2024        Task: Explained how to count carbohydrates using the food label and the use of dry measuring cups for accurate carb counting. Completed 7/25/2024        Task: Review the importance of balancing carbohydrates with each meal using portion control techniques to count servings of carbohydrate and label reading to identify serving size and amount of total carbs per serving. Completed 7/25/2024        Task: Provided Sample plate method and reviewed the use of the plate to estimate amounts of carbohydrate per meal. Completed 7/25/2024        Problem: Blood Glucose Self-Monitoring         Goal: Patient agrees to check and record blood sugars 1 time per day.    Start Date: 7/25/2024   Expected End Date: 7/10/2025   Priority: Medium   Barriers: No Barriers Identified   Note:    Educated on FBG of  mg/dL.       Task: Reviewed the importance of self-monitoring blood glucose and keeping logs. Completed 7/25/2024        Task: Instructed on how to self-monitor blood glucose using a home glucometer, how to properly dispose of used strips and lancets after use, and how to appropriately store meter and supplies. Completed 7/25/2024        Task: Discussed ways to minimize pain when monitoring blood glucose. Completed 7/25/2024          Follow Up Plan     Follow up if symptoms worsen or fail to improve.    Today's care plan and follow up schedule was discussed with patient.  Kaylan verbalized understanding of the care plan, goals, and agrees to follow up plan.        The patient was encouraged to communicate with his/her health care provider/physician and care team regarding his/her condition(s) and treatment.  I provided the patient with my contact  information today and encouraged to contact me via phone or [facility] as needed.     Length of Visit   Total Time: 30 Minutes

## 2024-08-08 ENCOUNTER — TELEPHONE (OUTPATIENT)
Dept: PHARMACY | Facility: CLINIC | Age: 54
End: 2024-08-08
Payer: COMMERCIAL

## 2024-10-10 ENCOUNTER — TELEPHONE (OUTPATIENT)
Dept: PREADMISSION TESTING | Facility: HOSPITAL | Age: 54
End: 2024-10-10
Payer: COMMERCIAL

## 2024-10-10 NOTE — TELEPHONE ENCOUNTER
----- Message from Nito sent at 10/10/2024 11:52 AM CDT -----  Regarding: Colonoscopy Nov 6th  Contact: Pt [phone]  [email or web address]     Patient is calling for Medical Advice regarding: Patient requesting colonoscopy for November 6th. She would like a call to schedule.    How long has patient had these symptoms:    Pharmacy name and phone#:    Patient wants a call back or thru [facility]: Call    Comments:    Please advise patient replies from provider may take up to 48 hours.

## 2024-10-10 NOTE — TELEPHONE ENCOUNTER
Spoke to pt and let her know she needed a referral for colonoscopy, she is at work and will call back at her convenience.

## 2024-10-21 ENCOUNTER — TELEPHONE (OUTPATIENT)
Dept: GASTROENTEROLOGY | Facility: CLINIC | Age: 54
End: 2024-10-21
Payer: COMMERCIAL

## 2024-10-21 NOTE — TELEPHONE ENCOUNTER
Spoke with patient on [phone] to schedule appointment with Dr. Schroeder. Patient has been scheduled accordingly and added to the wait-list should someone cancel or reschedule to which she voices understanding.     ----- Message from Summer sent at 10/21/2024  2:28 PM CDT -----  Contact: Kaylan  Type:  Sooner Apoointment Request    Caller is requesting a sooner appointment.  Caller declined first available appointment listed below.  Caller will not accept being placed on the waitlist and is requesting a message be sent to doctor.  Name of Caller:Kaylan  When is the first available appointment? 03/04/25  Symptoms:Est care/colonoscopy screening   Would the patient rather a call back or a response via [facility]? Call back  Best Call Back Number:[phone]   Additional Information: Pt wants to schedule on 11/06

## 2024-11-07 ENCOUNTER — HOSPITAL ENCOUNTER (OUTPATIENT)
Dept: PREADMISSION TESTING | Facility: HOSPITAL | Age: 54
Discharge: HOME | End: 2024-11-07
Attending: COLON & RECTAL SURGERY
Payer: COMMERCIAL

## 2024-11-21 ENCOUNTER — PATIENT MESSAGE (OUTPATIENT)
Dept: GASTROENTEROLOGY | Facility: CLINIC | Age: 54
End: 2024-11-21
Payer: COMMERCIAL

## 2024-11-26 ENCOUNTER — PATIENT MESSAGE (OUTPATIENT)
Dept: GASTROENTEROLOGY | Facility: CLINIC | Age: 54
End: 2024-11-26
Payer: COMMERCIAL

## 2025-02-16 ENCOUNTER — TELEPHONE (OUTPATIENT)
Dept: PHARMACY | Facility: CLINIC | Age: 55
End: 2025-02-16
Payer: COMMERCIAL

## 2025-02-17 NOTE — TELEPHONE ENCOUNTER
[facility] Chart Review & Patient Outreach Details For Medication Adherence Project    Reason for Outreach Encounter: 3rd Party payor non-compliance report ([employer], [employer], C, etc)  2.  Patient Outreach Method: Reviewed patient chart   3.   Medication in question:    Diabetes Medications              semaglutide (OZEMPIC) 0.25 mg or 0.5 mg (2 mg/3 mL) pen injector Inject 0.5 mg into the skin every 7 days. Dx e11.65                 Metformin not on list    4.  Reviewed and or Updates Made To: Patient Chart  5. Outreach Outcomes and/or actions taken: Medication discontinued  Additional Notes:

## 2025-03-07 ENCOUNTER — TELEPHONE (OUTPATIENT)
Dept: PHARMACY | Facility: CLINIC | Age: 55
End: 2025-03-07
Payer: COMMERCIAL

## 2025-03-07 NOTE — TELEPHONE ENCOUNTER
[facility] Chart Review & Patient Outreach Details For Medication Adherence Project    Reason for Outreach Encounter: 3rd Party payor non-compliance report ([employer], [employer], [employer], etc)  2.  Patient Outreach Method: Reviewed patient chart   3.   Medication in question:    Diabetes Medications              semaglutide (OZEMPIC) 0.25 mg or 0.5 mg (2 mg/3 mL) pen injector Inject 0.5 mg into the skin every 7 days. Dx e11.65                 ozempic  last filled  2/20 for 28 day supply      4.  Reviewed and or Updates Made To: Patient Chart  5. Outreach Outcomes and/or actions taken: Patient filled medication and is on track to be adherent  Additional Notes:

## 2025-03-21 ENCOUNTER — OFFICE VISIT (OUTPATIENT)
Dept: GASTROENTEROLOGY | Facility: CLINIC | Age: 55
End: 2025-03-21
Payer: COMMERCIAL

## 2025-03-21 VITALS
SYSTOLIC BLOOD PRESSURE: 129 MMHG | DIASTOLIC BLOOD PRESSURE: 88 MMHG | HEART RATE: 67 BPM | WEIGHT: 197.31 LBS | BODY MASS INDEX: 29.22 KG/M2 | HEIGHT: 69 IN

## 2025-03-21 DIAGNOSIS — K52.9 CHRONIC DIARRHEA: Primary | ICD-10-CM

## 2025-03-21 DIAGNOSIS — Z86.19 HISTORY OF HELICOBACTER PYLORI INFECTION: ICD-10-CM

## 2025-03-21 DIAGNOSIS — R11.0 NAUSEA: ICD-10-CM

## 2025-03-21 DIAGNOSIS — Z85.038 HISTORY OF COLON CANCER: ICD-10-CM

## 2025-03-21 PROCEDURE — 99999 PR PBB SHADOW E&M-EST. PATIENT-LVL IV: CPT | Mod: PBBFAC,,, | Performed by: NURSE PRACTITIONER

## 2025-03-21 PROCEDURE — 87209 SMEAR COMPLEX STAIN: CPT | Performed by: NURSE PRACTITIONER

## 2025-03-21 RX ORDER — SODIUM, POTASSIUM,MAG SULFATES 17.5-3.13G
SOLUTION, RECONSTITUTED, ORAL ORAL
Qty: 354 ML | Refills: 0 | Status: SHIPPED | OUTPATIENT
Start: 2025-03-21

## 2025-03-21 NOTE — PROGRESS NOTES
Clinic Consult:  [facility] Consultation Note    Reason for Consult:  The primary encounter diagnosis was Chronic diarrhea. Diagnoses of Nausea and History of colon cancer were also pertinent to this visit.    PCP: Jyothi Edwards   No address on file    HPI:  This is a 55 y.o. female here for evaluation of the above.   Has a history of colon cancer (sigmoid)-- diagnosed in 10/2023. Underwent resection in 12/2023 and chemo (completed in August 2024).   Not long after diagnosis/treatment, she started with diarrhea. Eating triggers her diarrhea but also has nocturnal diarrhea.   She has 6-8 loose bowels per day. Sometimes is with urgency. Other associated symptoms include lower abdominal pain.      She has not had a follow up colonoscopy.     She was diagnosed with H. Pylori on EGD done at that time. I have reviewed her chart and am not sure if she was treated. She doesn't recall being treated for it either. She has been having nausea. She was on Ozempic but discontinued it 2 weeks ago.    Review of Systems   Constitutional:  Negative for fever and weight loss.   HENT:  Negative for sore throat.    Respiratory:  Negative for cough, shortness of breath and wheezing.    Cardiovascular:  Negative for chest pain and palpitations.   Gastrointestinal:  Positive for abdominal pain, diarrhea and nausea. Negative for blood in stool, constipation, heartburn, melena and vomiting.   Skin:  Negative for itching and rash.       Medical History:  has a past medical history of Anxiety (2021), BRCA negative, Colon cancer, Depression (2021), and Fibroid (2007).    Surgical History:  has a past surgical history that includes Hysterectomy (2007); Colonoscopy (N/A, 10/19/2023); and Esophagogastroduodenoscopy (N/A, 10/19/2023).    Family History: family history includes Breast cancer (age of onset: 55) in her sister; Cervical cancer in her maternal aunt; Diabetes in her father; Heart failure in her mother; Hypertension in  "her mother; Ovarian cancer (age of onset: 62) in her maternal aunt..     Social History:  reports that she has never smoked. She has never used smokeless tobacco. She reports that she does not drink alcohol and does not use drugs.    Allergies: Reviewed    Home Medications:   Medications Ordered Prior to Encounter[1]    Physical Exam:  /88 (BP Location: Left arm, Patient Position: Sitting)   Pulse 67   Ht 5' 9" (1.753 m)   Wt 89.5 kg (197 lb 5 oz)   BMI 29.14 kg/m²   Body mass index is 29.14 kg/m².  Physical Exam  Constitutional:       General: She is not in acute distress.  Cardiovascular:      Rate and Rhythm: Normal rate and regular rhythm.      Heart sounds: Normal heart sounds. No murmur heard.  Pulmonary:      Effort: Pulmonary effort is normal. No respiratory distress.      Breath sounds: Normal breath sounds.   Abdominal:      General: Bowel sounds are normal.   Neurological:      Mental Status: She is alert.   Psychiatric:         Mood and Affect: Mood normal.         Behavior: Behavior normal.         Labs: Pertinent labs reviewed.    Assessment:  1. Chronic diarrhea    2. Nausea    3. History of colon cancer    Diarrhea could be from colectomy done for colon cancer. I do not think she was treated for H. Pylori as this was at the time her colon cancer was identified.     Recommendations:   - EGD and colonoscopy  - will get bx to rule out H. Pylori as well as for diarrhea  - will check stool studies for infection  - if all normal, will have her start anti-diarrhea. We discussed imodium and lomotil. She is very sensitive to medications and says tylenol makes her sleepy so would like to try imodium first.     Chronic diarrhea  -     Ambulatory referral/consult to Endo Procedure   -     sodium,potassium,mag sulfates (SUPREP BOWEL PREP KIT) 17.5-3.13-1.6 gram SolR; Use as directed  Dispense: 354 mL; Refill: 0  -     Clostridium difficile EIA; Future; Expected date: 03/21/2025  -     Giardia / " Cryptosporidum, EIA; Future; Expected date: 03/21/2025  -     Stool Exam-Ova,Cysts,Parasites; Future; Expected date: 03/21/2025  -     WBC, Stool; Future; Expected date: 03/21/2025  -     Stool culture; Future; Expected date: 03/21/2025    Nausea  -     Ambulatory referral/consult to Endo Procedure   -     sodium,potassium,mag sulfates (SUPREP BOWEL PREP KIT) 17.5-3.13-1.6 gram SolR; Use as directed  Dispense: 354 mL; Refill: 0    History of colon cancer  -     Ambulatory referral/consult to Endo Procedure   -     sodium,potassium,mag sulfates (SUPREP BOWEL PREP KIT) 17.5-3.13-1.6 gram SolR; Use as directed  Dispense: 354 mL; Refill: 0    Follow up to be determined after results/ procedure(s).    Thank you so much for allowing me to participate in the care of NATALIE Morris         [1]   Current Outpatient Medications on File Prior to Visit   Medication Sig Dispense Refill    blood sugar diagnostic Strp To check BG 2 times daily, to use with insurance preferred meter 200 strip 5    blood-glucose meter kit To check BG 2 times daily, to use with insurance preferred meter 1 each 0    hydrocortisone 2.5 % cream Aply qid prn hemorrhoid flaring 28 g 3    lancets Misc 2 Lancets by Subdermal route 2 (two) times daily before meals. To check BG 2 times daily, to use with insurance preferred meter 200 each 5    levocetirizine (XYZAL) 5 MG tablet Take 5 mg by mouth every evening.      atorvastatin (LIPITOR) 40 MG tablet Take 1 tablet (40 mg total) by mouth every evening. (Patient not taking: Reported on 3/21/2025) 90 tablet 1    gabapentin (NEURONTIN) 300 MG capsule Take 1 capsule (300 mg total) by mouth 3 (three) times daily. 90 capsule 5    semaglutide (OZEMPIC) 0.25 mg or 0.5 mg (2 mg/3 mL) pen injector Inject 0.5 mg into the skin every 7 days. Dx e11.65 (Patient not taking: Reported on 3/21/2025) 3 mL 5     No current facility-administered medications on file prior to visit.

## 2025-03-22 ENCOUNTER — LAB REQUISITION (OUTPATIENT)
Dept: LAB | Facility: HOSPITAL | Age: 55
End: 2025-03-22
Payer: COMMERCIAL

## 2025-03-22 DIAGNOSIS — K52.9 NONINFECTIVE GASTROENTERITIS AND COLITIS, UNSPECIFIED: ICD-10-CM

## 2025-03-22 LAB
C DIFF GDH STL QL: NEGATIVE
C DIFF TOX A+B STL QL IA: NEGATIVE

## 2025-03-22 PROCEDURE — 87045 FECES CULTURE AEROBIC BACT: CPT | Performed by: NURSE PRACTITIONER

## 2025-03-22 PROCEDURE — 87329 GIARDIA AG IA: CPT | Performed by: NURSE PRACTITIONER

## 2025-03-22 PROCEDURE — 87449 NOS EACH ORGANISM AG IA: CPT | Performed by: NURSE PRACTITIONER

## 2025-03-22 PROCEDURE — 89055 LEUKOCYTE ASSESSMENT FECAL: CPT | Performed by: NURSE PRACTITIONER

## 2025-03-22 PROCEDURE — 87427 SHIGA-LIKE TOXIN AG IA: CPT | Performed by: NURSE PRACTITIONER

## 2025-03-23 LAB — WBC #/AREA STL HPF: NORMAL /[HPF]

## 2025-03-24 LAB
CRYPTOSP AG SPEC QL: NEGATIVE
E COLI SXT1 STL QL IA: NEGATIVE
E COLI SXT2 STL QL IA: NEGATIVE
G LAMBLIA AG STL QL IA: NEGATIVE

## 2025-03-25 LAB — BACTERIA STL CULT: NORMAL

## 2025-03-26 ENCOUNTER — HOSPITAL ENCOUNTER (OUTPATIENT)
Dept: PREADMISSION TESTING | Facility: HOSPITAL | Age: 55
Discharge: HOME | End: 2025-03-26
Attending: FAMILY MEDICINE
Payer: COMMERCIAL

## 2025-03-26 DIAGNOSIS — K52.9 CHRONIC DIARRHEA: Primary | ICD-10-CM

## 2025-03-26 DIAGNOSIS — Z86.19 HISTORY OF HELICOBACTER PYLORI INFECTION: ICD-10-CM

## 2025-03-26 DIAGNOSIS — Z85.038 HISTORY OF COLON CANCER: ICD-10-CM

## 2025-03-27 LAB — O+P STL MICRO: NORMAL

## 2025-04-01 ENCOUNTER — ANESTHESIA EVENT (OUTPATIENT)
Dept: ENDOSCOPY | Facility: HOSPITAL | Age: 55
End: 2025-04-01
Payer: COMMERCIAL

## 2025-04-01 NOTE — ANESTHESIA PREPROCEDURE EVALUATION
04/01/2025  Kaylan Lal is a 55 y.o., female.  Past Medical History:   Diagnosis Date    Anxiety 2021    BRCA negative     per pt report    Colon cancer     Depression 2021    Fibroid 2007     Past Surgical History:   Procedure Laterality Date    COLONOSCOPY N/A 10/19/2023    Procedure: COLONOSCOPY;  Surgeon: Gladys Teixeira MD;  Location: [facility];  Service: Endoscopy;  Laterality: N/A;    ESOPHAGOGASTRODUODENOSCOPY N/A 10/19/2023    Procedure: EGD (ESOPHAGOGASTRODUODENOSCOPY);  Surgeon: Gladys Teixeira MD;  Location: [facility];  Service: Endoscopy;  Laterality: N/A;    HYSTERECTOMY  2007    [facility]; Dr. Suero; re: fibroids/menorrhagia         Pre-op Assessment    I have reviewed the Patient Summary Reports.     I have reviewed the Nursing Notes. I have reviewed the NPO Status.   I have reviewed the Medications.     Review of Systems  Anesthesia Hx:  No problems with previous Anesthesia   History of prior surgery of interest to airway management or planning:  Previous anesthesia: General        Denies Family Hx of Anesthesia complications.    Denies Personal Hx of Anesthesia complications.                    Social:  Non-Smoker, Social Alcohol Use       Hematology/Oncology:       -- Anemia:                                  Hepatic/GI:        Colitis, diarrhea, Hx h. pylori and colon cancer             Musculoskeletal:         Spine Disorders: lumbar            Endocrine:  Diabetes, type 2           Psych:  Psychiatric History  depression                Physical Exam  General: Alert and Oriented    Airway:  Mallampati: II   Mouth Opening: Normal  TM Distance: Normal  Tongue: Normal  Neck ROM: Normal ROM    Dental:  Intact    Chest/Lungs:  Clear to auscultation, Normal Respiratory Rate    Heart:  Rate: Normal  Rhythm: Regular Rhythm        Anesthesia Plan  Type of Anesthesia, risks & benefits  discussed:    Anesthesia Type: Gen Natural Airway  Intra-op Monitoring Plan: Standard ASA Monitors  Post Op Pain Control Plan: multimodal analgesia  Induction:  IV  Informed Consent: Informed consent signed with the Patient and all parties understand the risks and agree with anesthesia plan.  All questions answered. Patient consented to blood products? No  ASA Score: 2  Day of Surgery Review of History & Physical: H&P Update referred to the surgeon/provider.    Ready For Surgery From Anesthesia Perspective.     .

## 2025-04-02 RX ORDER — SODIUM CHLORIDE, SODIUM LACTATE, POTASSIUM CHLORIDE, CALCIUM CHLORIDE 600; 310; 30; 20 MG/100ML; MG/100ML; MG/100ML; MG/100ML
INJECTION, SOLUTION INTRAVENOUS CONTINUOUS
Status: CANCELLED | OUTPATIENT
Start: 2025-04-02

## 2025-04-03 ENCOUNTER — ANESTHESIA (OUTPATIENT)
Dept: ENDOSCOPY | Facility: HOSPITAL | Age: 55
End: 2025-04-03
Payer: COMMERCIAL

## 2025-04-03 ENCOUNTER — HOSPITAL ENCOUNTER (OUTPATIENT)
Dept: ENDOSCOPY | Facility: HOSPITAL | Age: 55
Discharge: HOME | End: 2025-04-03
Attending: NURSE PRACTITIONER
Payer: COMMERCIAL

## 2025-04-03 ENCOUNTER — TELEPHONE (OUTPATIENT)
Dept: SURGERY | Facility: CLINIC | Age: 55
End: 2025-04-03
Payer: COMMERCIAL

## 2025-04-03 VITALS
HEART RATE: 63 BPM | TEMPERATURE: 97 F | SYSTOLIC BLOOD PRESSURE: 124 MMHG | DIASTOLIC BLOOD PRESSURE: 78 MMHG | RESPIRATION RATE: 18 BRPM | OXYGEN SATURATION: 100 % | HEIGHT: 69 IN | BODY MASS INDEX: 28.9 KG/M2 | WEIGHT: 195.13 LBS

## 2025-04-03 DIAGNOSIS — Z85.038 HISTORY OF COLON CANCER: ICD-10-CM

## 2025-04-03 DIAGNOSIS — C20 RECTAL CANCER: Primary | ICD-10-CM

## 2025-04-03 DIAGNOSIS — Z86.19 HISTORY OF HELICOBACTER PYLORI INFECTION: ICD-10-CM

## 2025-04-03 DIAGNOSIS — K52.9 CHRONIC DIARRHEA: ICD-10-CM

## 2025-04-03 PROBLEM — Z00.00 ROUTINE GENERAL MEDICAL EXAMINATION AT HEALTH CARE FACILITY: Status: ACTIVE | Noted: 2025-04-03

## 2025-04-03 PROBLEM — E78.00 PURE HYPERCHOLESTEROLEMIA: Status: ACTIVE | Noted: 2025-04-03

## 2025-04-03 PROBLEM — E11.65 INADEQUATELY CONTROLLED DIABETES MELLITUS: Status: ACTIVE | Noted: 2025-04-03

## 2025-04-03 PROBLEM — E78.2 MIXED HYPERLIPIDEMIA: Status: ACTIVE | Noted: 2025-04-03

## 2025-04-03 PROBLEM — E11.9 TYPE 2 DIABETES MELLITUS WITHOUT COMPLICATION, WITHOUT LONG-TERM CURRENT USE OF INSULIN: Status: ACTIVE | Noted: 2025-04-03

## 2025-04-03 PROBLEM — E88.810 DYSMETABOLIC SYNDROME X: Status: RESOLVED | Noted: 2023-10-12 | Resolved: 2025-04-03

## 2025-04-03 PROCEDURE — 37000008 HC ANESTHESIA 1ST 15 MINUTES

## 2025-04-03 PROCEDURE — 63600175 PHARM REV CODE 636 W HCPCS: Performed by: NURSE ANESTHETIST, CERTIFIED REGISTERED

## 2025-04-03 PROCEDURE — 27201012 HC FORCEPS, HOT/COLD, DISP: Performed by: INTERNAL MEDICINE

## 2025-04-03 PROCEDURE — 37000009 HC ANESTHESIA EA ADD 15 MINS

## 2025-04-03 RX ORDER — LIDOCAINE HYDROCHLORIDE 20 MG/ML
INJECTION, SOLUTION EPIDURAL; INFILTRATION; INTRACAUDAL; PERINEURAL
Status: DISCONTINUED | OUTPATIENT
Start: 2025-04-03 | End: 2025-04-03

## 2025-04-03 RX ORDER — PROPOFOL 10 MG/ML
VIAL (ML) INTRAVENOUS
Status: DISCONTINUED | OUTPATIENT
Start: 2025-04-03 | End: 2025-04-03

## 2025-04-03 RX ADMIN — LIDOCAINE HYDROCHLORIDE 90 MG: 20 INJECTION, SOLUTION EPIDURAL; INFILTRATION; INTRACAUDAL; PERINEURAL at 11:04

## 2025-04-03 RX ADMIN — PROPOFOL 30 MG: 10 INJECTION, EMULSION INTRAVENOUS at 11:04

## 2025-04-03 RX ADMIN — PROPOFOL 70 MG: 10 INJECTION, EMULSION INTRAVENOUS at 11:04

## 2025-04-03 RX ADMIN — PROPOFOL 40 MG: 10 INJECTION, EMULSION INTRAVENOUS at 11:04

## 2025-04-03 RX ADMIN — PROPOFOL 50 MG: 10 INJECTION, EMULSION INTRAVENOUS at 11:04

## 2025-04-03 RX ADMIN — PROPOFOL 20 MG: 10 INJECTION, EMULSION INTRAVENOUS at 11:04

## 2025-04-03 NOTE — TELEPHONE ENCOUNTER
Spoke with patient regarding referral from Dr. Seth. Appointment scheduled with Dr. Sebastian for 4/10 at 10 am. All details given of this appointment. Patient prefers a female provider. Direct contact number provided.

## 2025-04-03 NOTE — TRANSFER OF CARE
"Anesthesia Transfer of Care Note    Patient: Kaylan Lal    Procedure(s) Performed: * No procedures listed *    Patient location: PACU    Anesthesia Type: general    Transport from OR: Transported from OR on room air with adequate spontaneous ventilation    Post pain: adequate analgesia    Post assessment: tolerated procedure well and no apparent anesthetic complications    Post vital signs: stable    Level of consciousness: responds to stimulation    Nausea/Vomiting: no nausea/vomiting    Complications: none    Transfer of care protocol was followed      Last vitals: Visit Vitals  /72   Pulse 62   Temp 36.3 °C (97.3 °F)   Resp 15   Ht 5' 9" (1.753 m)   Wt 88.5 kg (195 lb 1.7 oz)   SpO2 100%   Breastfeeding No   BMI 28.81 kg/m²     "

## 2025-04-03 NOTE — H&P
Endoscopy History and Physical    PCP - Jyothi Edwards MD  [place]  [id number] [facility] [place] [address]      ASA - per anesthesia  Mallampati - per anesthesia  History of Anesthesia problems - no  Family history Anesthesia problems -  no   Plan of anesthesia - General    HPI  55 y.o. female    Planned Procedure: Colonoscopy and EGD  Diagnosis: chronic diarrhea of 6 duration  Chief Complaint: Same as above          ROS:  Constitutional: No fevers, chills, No weight loss  CV: No chest pain  Pulm: No cough, No shortness of breath  GI: see HPI    Medical History:  has a past medical history of Anxiety (2021), BRCA negative, Colon cancer, Depression (2021), and Fibroid (2007).    Surgical History:  has a past surgical history that includes Hysterectomy (2007); Colonoscopy (N/A, 10/19/2023); and Esophagogastroduodenoscopy (N/A, 10/19/2023).    Family History: family history includes Breast cancer (age of onset: 55) in her sister; Cervical cancer in her maternal aunt; Diabetes in her father; Heart failure in her mother; Hypertension in her mother; Ovarian cancer (age of onset: 62) in her maternal aunt..    Social History:  reports that she has never smoked. She has never used smokeless tobacco. She reports that she does not drink alcohol and does not use drugs.    Review of patient's allergies indicates:   Allergen Reactions    Hydrocodone-acetaminophen     Lipitor [atorvastatin]      Muscle pain     Codeine Rash     Other reaction(s): Unknown    Hydrocodone Rash       Medications:   Prescriptions Prior to Admission[1]    Physical Exam:    Vital Signs: There were no vitals filed for this visit.    General Appearance: Well appearing in no acute distress  Abdomen: Soft, non tender, non distended with normal bowel sounds, no masses    Labs:  Lab Results   Component Value Date    WBC 6.7 08/31/2023    HGB 13.4 08/31/2023    HCT 42.8 08/31/2023     08/31/2023    CHOL  223 (H) 11/29/2024    TRIG 64 11/29/2024    HDL 72 11/29/2024    ALT 18 11/29/2024    AST 25 11/29/2024     11/29/2024    K 4.2 11/29/2024     11/29/2024    CREATININE 0.93 11/29/2024    BUN 12 11/29/2024    CO2 25 11/29/2024    TSH 0.722 08/31/2023    HGBA1C 6.3 (H) 11/29/2024    MICROALBUR 3.6 11/29/2024       I have explained the risks and benefits of this endoscopic procedure to the patient including but not limited to bleeding, inflammation, infection, perforation, and death.    SEDATION PLAN: per anesthesia       History reviewed, vital signs satisfactory, cardiopulmonary status satisfactory, sedation options, risks and plans have been discussed with the patient  All their questions were answered and the patient agrees to the sedation procedures as planned and the patient is deemed an appropriate candidate for the sedation as planned.     The risks, benefits and alternatives of the procedure were discussed with the patient in detail. This discussion was had in the presence of endoscopy staff. The risks include, risks of adverse reaction to sedation requiring the use of reversal agents, bleeding requiring blood transfusion, perforation requiring surgical intervention and technical failure. Other risks include aspiration leading to respiratory distress and respiratory failure resulting in endotracheal intubation and mechanical ventilation including death. If anesthesia is being utilized for this procedure, it is up to the anesthesiologist to determine airway safety including elective endotracheal intubation. Questions were answered, they agree to proceed. There was no language barriers.       Procedure explained to patient, informed consent obtained and placed in chart.       Neal Seth MD       [1] (Not in a hospital admission)

## 2025-04-03 NOTE — DISCHARGE SUMMARY
[facility] - Endoscopy 1st Fl  Discharge Note  Short Stay    Colonoscopy  EGD      OUTCOME: Patient tolerated treatment/procedure well without complication and is now ready for discharge.    DISPOSITION: Home or Self Care    FINAL DIAGNOSIS:  <principal problem not specified>    FOLLOWUP: With primary care provider    DISCHARGE INSTRUCTIONS:  No discharge procedures on file.     TIME SPENT ON DISCHARGE: 20 minutes

## 2025-04-03 NOTE — ANESTHESIA POSTPROCEDURE EVALUATION
Anesthesia Post Evaluation    Patient: Kaylan Lal    Procedure(s) Performed: * No procedures listed *    Final Anesthesia Type: general      Patient location during evaluation: PACU  Patient participation: Yes- Able to Participate  Level of consciousness: awake and alert and oriented  Post-procedure vital signs: reviewed and stable  Pain management: adequate  Airway patency: patent    PONV status at discharge: No PONV  Anesthetic complications: no      Cardiovascular status: blood pressure returned to baseline, stable and hemodynamically stable  Respiratory status: unassisted  Hydration status: euvolemic  Follow-up not needed.              Vitals Value Taken Time   BP 94/65 04/03/25 11:44   Temp 36.3 °C (97.4 °F) 04/03/25 11:39   Pulse 80 04/03/25 11:45   Resp 29 04/03/25 11:45   SpO2 99 % 04/03/25 11:45   Vitals shown include unfiled device data.      No case tracking events are documented in the log.      Pain/Mary Ann Score: Mary Ann Score: 8 (4/3/2025 11:39 AM)

## 2025-04-04 ENCOUNTER — TELEPHONE (OUTPATIENT)
Dept: SURGERY | Facility: CLINIC | Age: 55
End: 2025-04-04
Payer: COMMERCIAL

## 2025-04-04 DIAGNOSIS — C20 RECTAL CANCER: Primary | ICD-10-CM

## 2025-04-04 NOTE — TELEPHONE ENCOUNTER
Spoke with patient- consent obtained to request records from [facility] ([place]). For faxed. Will schedule MRI. Appointment with Dr. Sebastian on 4/10. Patient advised to call me with any questions.

## 2025-04-07 ENCOUNTER — PATIENT MESSAGE (OUTPATIENT)
Dept: SURGICAL ONCOLOGY | Facility: CLINIC | Age: 55
End: 2025-04-07
Payer: COMMERCIAL

## 2025-04-10 ENCOUNTER — RESULTS FOLLOW-UP (OUTPATIENT)
Dept: GASTROENTEROLOGY | Facility: CLINIC | Age: 55
End: 2025-04-10

## 2025-04-10 DIAGNOSIS — A04.8 H. PYLORI INFECTION: Primary | ICD-10-CM

## 2025-04-10 RX ORDER — CLARITHROMYCIN 500 MG/1
500 TABLET, FILM COATED ORAL 2 TIMES DAILY
Qty: 28 TABLET | Refills: 0 | Status: SHIPPED | OUTPATIENT
Start: 2025-04-10 | End: 2025-04-24

## 2025-04-10 RX ORDER — PANTOPRAZOLE SODIUM 40 MG/1
40 TABLET, DELAYED RELEASE ORAL 2 TIMES DAILY
Qty: 28 TABLET | Refills: 0 | Status: SHIPPED | OUTPATIENT
Start: 2025-04-10 | End: 2025-06-11

## 2025-04-10 RX ORDER — AMOXICILLIN 500 MG/1
1000 TABLET, FILM COATED ORAL 2 TIMES DAILY
Qty: 56 TABLET | Refills: 0 | Status: SHIPPED | OUTPATIENT
Start: 2025-04-10 | End: 2025-04-24

## 2025-04-16 ENCOUNTER — HOSPITAL ENCOUNTER (OUTPATIENT)
Dept: RADIOLOGY | Facility: HOSPITAL | Age: 55
Discharge: HOME | End: 2025-04-16
Attending: STUDENT IN AN ORGANIZED HEALTH CARE EDUCATION/TRAINING PROGRAM
Payer: COMMERCIAL

## 2025-04-16 DIAGNOSIS — C20 RECTAL CANCER: ICD-10-CM

## 2025-04-16 PROCEDURE — 72197 MRI PELVIS W/O & W/DYE: CPT | Mod: 26,,, | Performed by: RADIOLOGY

## 2025-04-16 PROCEDURE — 72197 MRI PELVIS W/O & W/DYE: CPT | Mod: TC,PN

## 2025-04-16 PROCEDURE — A9585 GADOBUTROL INJECTION: HCPCS | Mod: PN | Performed by: STUDENT IN AN ORGANIZED HEALTH CARE EDUCATION/TRAINING PROGRAM

## 2025-04-16 PROCEDURE — 25500020 PHARM REV CODE 255: Mod: PN | Performed by: STUDENT IN AN ORGANIZED HEALTH CARE EDUCATION/TRAINING PROGRAM

## 2025-04-16 RX ORDER — GADOBUTROL 604.72 MG/ML
9 INJECTION INTRAVENOUS
Status: COMPLETED | OUTPATIENT
Start: 2025-04-16 | End: 2025-04-16

## 2025-04-16 RX ADMIN — GADOBUTROL 9 ML: 604.72 INJECTION INTRAVENOUS at 09:04

## 2025-04-17 ENCOUNTER — LAB VISIT (OUTPATIENT)
Dept: LAB | Facility: HOSPITAL | Age: 55
End: 2025-04-17
Attending: STUDENT IN AN ORGANIZED HEALTH CARE EDUCATION/TRAINING PROGRAM
Payer: COMMERCIAL

## 2025-04-17 ENCOUNTER — OFFICE VISIT (OUTPATIENT)
Dept: SURGERY | Facility: CLINIC | Age: 55
End: 2025-04-17
Payer: COMMERCIAL

## 2025-04-17 VITALS
WEIGHT: 202.5 LBS | BODY MASS INDEX: 29.99 KG/M2 | OXYGEN SATURATION: 95 % | HEART RATE: 68 BPM | SYSTOLIC BLOOD PRESSURE: 127 MMHG | HEIGHT: 69 IN | DIASTOLIC BLOOD PRESSURE: 79 MMHG

## 2025-04-17 DIAGNOSIS — C20 RECTAL CANCER: ICD-10-CM

## 2025-04-17 DIAGNOSIS — C19 RECURRENT COLORECTAL CANCER: Primary | ICD-10-CM

## 2025-04-17 PROCEDURE — 99999 PR PBB SHADOW E&M-EST. PATIENT-LVL V: CPT | Mod: PBBFAC,,, | Performed by: STUDENT IN AN ORGANIZED HEALTH CARE EDUCATION/TRAINING PROGRAM

## 2025-04-17 PROCEDURE — 82378 CARCINOEMBRYONIC ANTIGEN: CPT

## 2025-04-17 PROCEDURE — 36415 COLL VENOUS BLD VENIPUNCTURE: CPT

## 2025-04-18 LAB — CARCINOEMBRYONIC ANTIGEN (OHS): 18.7 NG/ML

## 2025-04-21 ENCOUNTER — DOCUMENTATION ONLY (OUTPATIENT)
Dept: HEMATOLOGY/ONCOLOGY | Facility: CLINIC | Age: 55
End: 2025-04-21
Payer: COMMERCIAL

## 2025-04-21 ENCOUNTER — TELEPHONE (OUTPATIENT)
Dept: RADIATION ONCOLOGY | Facility: CLINIC | Age: 55
End: 2025-04-21
Payer: COMMERCIAL

## 2025-04-21 NOTE — TELEPHONE ENCOUNTER
Attempted to call patient to schedule Rad Onc consult but there was no answer. Left a detailed message including our direct number to call back to schedule appt.

## 2025-04-21 NOTE — PROGRESS NOTES
Called patient to schedule an appointment with the Oncologist.   Appointment scheduled for: 5/1/25  Patient informed to arrive 30mins before appointment time given.

## 2025-04-21 NOTE — PROGRESS NOTES
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 female presents with new findings of a low rectal mass.  Patient has a complicated past history.  10/2023 patient was found to have a sigmoid colon mass.  Preoperative MRI revealed that this was not rectal in origin and all outside of the pelvis.  Patient had upfront surgical management at [facility] which included sigmoid colectomy with end to side colorectal anastomosis partial cystectomy and partial vaginectomy.  Per the operative report there was some tumor spillage intraoperatively.  Patient did receive adjuvant chemotherapy.  In 09/2024 patient had CT scan that showed a pelvic mass.  Patient reports that she was not made aware of this.  She then presented to [facility] with complaints of rectal bleeding and pelvic pain.  She was scheduled for a colonoscopy and colonoscopy was found to have a malignant lesion approximately 10 cm from the anal verge not involving and distal to prior anastomosis.  She presents today for evaluation.    Colonoscopy: Overall Impression:   2 malignant-appearing, friable, polypoid and ulcerated masses measuring 4 cm x 5 cm in the proximal rectum 10 cm from the anal verge, covering two thirds of the circumference; bleeding occurred after intervention; performed cold forceps biopsy  Abnormal Ivet pouch in the rectosigmoid  Observed the ascending colon, transverse colon and descending colon. Poor prep. Rest examined colon was normal in direct view    Pathology: 2. Rectum, Mass, Biopsy:     - Invasive adenocarcinoma, well differentiated. See Comment.     Comment:  The patient's history of colonic primary invasive adenocarcinoma ([facility]-) status post resection and chemotherapy is noted.  The present case demonstrates focal invasive carcinoma arising in a background of high-grade  dysplasia and necrosis, consistent with recurrence/persistence of the patient's known colonic primary adenocarcinoma. Immunohistochemical stains (IHC) for CK20 and CDX-2 are performed with appropriate controls reviewed and are POSITIVE. H&E levels are examine    Review of patient's allergies indicates:   Allergen Reactions    Hydrocodone-acetaminophen     Lipitor [atorvastatin]      Muscle pain     Codeine Rash     Other reaction(s): Unknown    Hydrocodone Rash       Past Medical History:   Diagnosis Date    Anxiety 2021    BRCA negative     per pt report    Colon cancer     Depression 2021    Fibroid 2007     Past Surgical History:   Procedure Laterality Date    COLONOSCOPY N/A 10/19/2023    Procedure: COLONOSCOPY;  Surgeon: Gladys Teixeira MD;  Location: [facility];  Service: Endoscopy;  Laterality: N/A;    ESOPHAGOGASTRODUODENOSCOPY N/A 10/19/2023    Procedure: EGD (ESOPHAGOGASTRODUODENOSCOPY);  Surgeon: Gladys Teixeira MD;  Location: [facility];  Service: Endoscopy;  Laterality: N/A;    HYSTERECTOMY  2007    [facility]; Dr. Suero; re: fibroids/menorrhagia     Family History   Problem Relation Name Age of Onset    Heart failure Mother Celi Lal     Hypertension Mother Celi Lal     Diabetes Father Leo Aguilar     Breast cancer Sister  55    Cervical cancer Maternal Aunt Linda Lees     Ovarian cancer Maternal Aunt  62     Social History[1]         OBJECTIVE:     Vital Signs (Most Recent)  Pulse: 68 (04/17/25 1428)  BP: 127/79 (04/17/25 1428)  SpO2: 95 % (04/17/25 1428)    Physical Exam:  Physical Exam  Constitutional:       Appearance: She is well-developed.   HENT:      Head: Normocephalic and atraumatic.   Eyes:      Conjunctiva/sclera: Conjunctivae normal.      Pupils: Pupils are equal, round, and reactive to light.   Neck:      Thyroid: No thyromegaly.   Cardiovascular:      Rate and Rhythm: Normal rate and regular rhythm.   Pulmonary:      Effort: Pulmonary effort is normal. No respiratory  distress.   Abdominal:      General: There is no distension.      Palpations: Abdomen is soft. There is no mass.      Tenderness: There is no abdominal tenderness.   Musculoskeletal:         General: No tenderness. Normal range of motion.      Cervical back: Normal range of motion.   Skin:     General: Skin is warm and dry.      Capillary Refill: Capillary refill takes less than 2 seconds.   Neurological:      General: No focal deficit present.      Mental Status: She is alert and oriented to person, place, and time.         MRI pelvis  There is a large mucin containing T2 hyperintense mass centered in the posterior pelvis with epicenter outside of bowel and demonstrates some mass effect upon collapsed rectosigmoid.  It demonstrates diffuse heterogeneous enhancement.  It is significantly reduced in size measuring roughly 4.8 x 4.2 x 4.2 cm compared to approximately 7.0 x 10.5 x 11.3 cm on the prior examination (remeasured by me today).     There is a small rounded right-sided mesorectal lymph node which appears increased in size which measures approximately 6 mm (series 2, image 29).  No additional enlarged lymph nodes  ASSESSMENT/PLAN:     Kaylan was seen today for mass.    Diagnoses and all orders for this visit:    Recurrent colorectal cancer  -     Ambulatory referral/consult to Hematology / Oncology; Future  -     Ambulatory referral/consult to Radiation Oncology; Future    Rectal cancer  -     Ambulatory referral/consult to Colorectal Surgery  -     CEA; Future      - flexible sigmoidoscopy today revealed malignant appearing mass approximately 7 cm from the anal verge.  Given patient's lack of sedation I was not able to traverse proximal to assess the prior end to side colorectal anastomosis however this was visualized on prior endoscopy  - reviewed with the patient that the MRI shows that this has an extra colonic/rectal pelvic metastasis with a invasion into the rectum  - patient has never received  chemoradiation.  My recommendation would be to proceed with chemoradiation followed by restaging with plan for resection  - I will review images proposed plan at multidisciplinary tumor board  - I will call patient after tumor board to discuss recommendations    >60 minutes were spent on extensive external chart review as well as face to face discussion with the patient including education on pathophysiology of disease and treatment recommendations           [1]   Social History  Tobacco Use    Smoking status: Never    Smokeless tobacco: Never   Substance Use Topics    Alcohol use: Never    Drug use: Never

## 2025-04-22 ENCOUNTER — TELEPHONE (OUTPATIENT)
Dept: PHARMACY | Facility: CLINIC | Age: 55
End: 2025-04-22
Payer: COMMERCIAL

## 2025-04-22 ENCOUNTER — PATIENT MESSAGE (OUTPATIENT)
Dept: HEMATOLOGY/ONCOLOGY | Facility: CLINIC | Age: 55
End: 2025-04-22
Payer: COMMERCIAL

## 2025-04-22 NOTE — TELEPHONE ENCOUNTER
[facility]/Population Health Chart Review & Patient Outreach Details For Medication Adherence Project    Reason for Outreach Encounter: 3rd Party payor non-compliance report ([employer], [employer], [employer], etc)  2.  Patient Outreach Method: Reviewed patient chart   3.   Medication in question:    Diabetes Medications              SITagliptin phosphate (JANUVIA) 100 MG Tab Take 1 tablet (100 mg total) by mouth once daily.                 LF 30 ds 4/3/25  Ozempic dc'ed    4.  Reviewed and or Updates Made To: Patient Chart  5. Outreach Outcomes and/or actions taken: Patient filled medication and is on track to be adherent and Medication discontinued  Additional Notes:

## 2025-04-30 ENCOUNTER — INITIAL CONSULT (OUTPATIENT)
Dept: RADIATION ONCOLOGY | Facility: CLINIC | Age: 55
End: 2025-04-30
Payer: COMMERCIAL

## 2025-04-30 VITALS
WEIGHT: 197.75 LBS | RESPIRATION RATE: 18 BRPM | TEMPERATURE: 98 F | BODY MASS INDEX: 29.29 KG/M2 | HEART RATE: 64 BPM | HEIGHT: 69 IN | OXYGEN SATURATION: 100 % | SYSTOLIC BLOOD PRESSURE: 131 MMHG | DIASTOLIC BLOOD PRESSURE: 83 MMHG

## 2025-04-30 DIAGNOSIS — C18.7 MALIGNANT NEOPLASM OF SIGMOID COLON: Primary | ICD-10-CM

## 2025-04-30 DIAGNOSIS — C19 RECURRENT COLORECTAL CANCER: ICD-10-CM

## 2025-04-30 DIAGNOSIS — C19 RECURRENT COLORECTAL CANCER: Primary | ICD-10-CM

## 2025-04-30 PROBLEM — C18.9 COLON CANCER: Status: ACTIVE | Noted: 2025-04-30

## 2025-04-30 PROCEDURE — 99999 PR PBB SHADOW E&M-EST. PATIENT-LVL IV: CPT | Mod: PBBFAC,,, | Performed by: SPECIALIST

## 2025-04-30 NOTE — PROGRESS NOTES
I have been asked to see this delightful 55-year-old with a pelvic recurrence of sigmoid colon cancer that had invaded the vagina prior to initial surgery.  History of present illness.  Mrs. Lal presented with iron-deficiency anemia and a vaginal discharge.  At colonoscopy 10/19/2023 she was found to have a mass above the rectosigmoid junction occupying 2/3 circumference with biopsy showing well-differentiated adenocarcinoma.  MRI of the pelvis 11/11/2023 showed an 11.3 x 6.4 x 7.2 cm mass with mucinous appearing features that invaded the vaginal cuff and adjacent vaginal apex.  On 12/18/2023 she underwent a combined surgical procedure for resection.  Dr. Jakub Angulo performed her radical vaginectomy and noted proximal vaginal tumor involvement at the cuff with a small amount of the vaginal wall.  He did notice inflammation in the vesicovaginal plane.  Dr. Powers noted tumor adherent to the bilateral pelvic sidewalls performing a total mesorectal excision with a Ivet pouch and anastomosis.  He did node either a separate met in the pelvis versus tumor spillage. She ended up having a 10 cm mass with 1 of 44 lymph nodes involved it was a two-stage surgery with a radical vaginectomy followed by removal of the mass.  Ultimately she was staged as IIIC (T4b N1a M0).  She was begun on systemic chemotherapy in February of 2024 receiving her last cycle in August of 2024.  Her CEA 04/17/2025 was measured at 18.7 with her preoperative CEA measured 10/04/2023 at 97.28.  PET scanning performed 06/26/2024 showed a hypermetabolic lesion anterior to the sacrum..  At colonoscopy 04/03/2025 she was noted to have masses 10 cm from the anal verge with biopsies confirming recurrent adenocarcinoma.  She has seen Dr. Sebastina and is scheduled to see Dr. Diaz tomorrow.  MRI imaging of her rectum performed 04/04/2025 confirms the mass in the presacral location with a question of a right mesorectal node.  At the current time she  has a stabbing pain in her rectum increased by walking usually only to 3 to 4/10.  She is taking Tylenol for that.  She did have a fair amount of diarrhea after colonoscopy but this is resolved.  She has not noted any bright red blood per rectum.  She has not noted any vaginal discharge.  Past medical history:  History of H. pylori noted on EGD 10/19/2023 not treated to date  Adult onset diabetes mellitus  Anxiety/depression well controlled  Past surgical history:  Hysterectomy 2007 for fibroids  Surgery per history of present illness  Allergies: Codeine and hydrocodone cause a rash.  Lipitor causes muscle pain.  Habits: She does not smoke or drink  Family history: A maternal aunt had ovarian cancer.  Her sister had breast cancer.  Social: She lives here in [place].  She enjoys going to Judaism and starting the [employer].  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  She is anticipating FMLA to be off during this period of treatment.  Review of systems: Noncontributory  Her ECOG performance status is 0  Physical examination: She is a well-developed well-nourished woman in no apparent distress.  She is alert and oriented answering questions appropriately  She has no palpable supraclavicular adenopathy  On cardiovascular exam she has a regular rhythm and rate without murmur rub or gallop  Her lungs are clear to auscultation  Her abdomen is benign  On pelvic exam she has normal external genitalia.  She has a foreshortened vaginal vault which is palpably normal and normal on speculum exam.  There no mucosal lesions.  On rectal examination I can not reach this lesion.  The rectal exam by me as normal.  Impression: Mrs. Lal has stage IIIC (T4b N1a M0) adenocarcinoma of the sigmoid colon with a pelvic recurrence.  I do believe that she will require a combination of radiotherapy, chemosensitization and surgery.  There may be also role for further systemic chemotherapy.  Given that she has had a prior  extensive surgery I do not believe that CAROL is indicated in this setting.  We have had a brief conversation about radiotherapy and the use of chemosensitization.  We have also had a brief conversation about possible surgical resection outcomes to include a permanent colostomy if necessary.  Plan: She will be scheduled for simulation.  I will have discussions with Dr. Diaz and Jaz once Dr. Diaz has seen the patient.  I certainly appreciate participating in this delightful woman's care    55 minutes was spent reviewing records and face-to-face with the patient

## 2025-05-01 ENCOUNTER — OFFICE VISIT (OUTPATIENT)
Dept: HEMATOLOGY/ONCOLOGY | Facility: CLINIC | Age: 55
End: 2025-05-01
Payer: COMMERCIAL

## 2025-05-01 ENCOUNTER — TELEPHONE (OUTPATIENT)
Dept: GYNECOLOGIC ONCOLOGY | Facility: CLINIC | Age: 55
End: 2025-05-01
Payer: COMMERCIAL

## 2025-05-01 VITALS
TEMPERATURE: 99 F | WEIGHT: 201.25 LBS | DIASTOLIC BLOOD PRESSURE: 81 MMHG | RESPIRATION RATE: 18 BRPM | SYSTOLIC BLOOD PRESSURE: 124 MMHG | BODY MASS INDEX: 29.81 KG/M2 | HEART RATE: 66 BPM | HEIGHT: 69 IN | OXYGEN SATURATION: 100 %

## 2025-05-01 DIAGNOSIS — C19 RECURRENT COLORECTAL CANCER: ICD-10-CM

## 2025-05-01 PROCEDURE — 99999 PR PBB SHADOW E&M-EST. PATIENT-LVL V: CPT | Mod: PBBFAC,,, | Performed by: INTERNAL MEDICINE

## 2025-05-01 NOTE — PROGRESS NOTES
[facility] [place], [place]  Phone: [phone];  Fax: [phone]    Patient ID: Kaylan Lal   Reason for Visit: Establish Care and Colon Cancer  MRN:  [id number]     Oncologic Diagnosis:  Hx Stage IIIC (T4b N1a M0) Adenocarcinoma of the Sigmoid Colon (involving the posterior vagina) with now Pelvic Recurrence   Previous Treatment:    s/p Robotic-assisted posterior exenteration/LAR, vaginectomy and cystorrhaphy - 12/18/2023Dr. Jakub Powers   Adjuvant Chemotherapy with?  CAPOX x7 cycles (February 2024 - August 2024)  Current Treatment:  Pending full restaging imaging and potential chemorads  Subjective   Kaylan Lal is a 55 y.o. female with history of stage IIIC adenocarcinoma of the sigmoid colon with treatment history as documented above, GERD, DM II, non-insulin dependent, HLD and H. Pylori - ?untreated who presents to clinic to [facility] after noted to have pelvic recurrence.      She initially presented with persistent vaginal discharge and was evaluated by her OBGYN physician who then referred her to Gastroenterology for colonoscopy.  Colonoscopy was done 10/19/2023 she was noted to have a mass above the rectosigmoid area.  Biopsy pathology resulted as well-differentiated adenocarcinoma.  MRI of the pelvis 11/11/2023 showed an 11.3 x 6.4 x 7.2 cm mass with mucinous appearing features that invaded the vaginal cuff and adjacent vaginal apex.  She subsequently underwent robotic-assisted posterior exenteration/LAR, vaginectomy and cystorrhaphy 12/18/2023 by Dr. Jakub Angulo and Dr. Powers.  Final path resulted as invasive mucinous adenocarcinoma, moderately differentiated, involving the vaginal mucosa (pT4b pN1a).        She initially was seen at [facility] with Dr. Teague and reports receiving 1 cycle of chemotherapy there but unsure what she received; she then transferred care to Dr. Dorantes.  At that time the plan was  for treatment with 7 cycles of CAPEOX followed by Signatera testing.  On review of her record it states that she is receiving Kisqali/fulvestrant/Xgeva in the patient is unsure of what she received.  She does remember taking a chemotherapy pill and infusion.  I informed her that this is likely a typographical error however we will obtain the floor record to try to confirmed.    We reviewed in detail her imaging as well as the recommended treatment plan in tumor board consensus.  We discussed the nature of recurrent colon cancer and stage IV disease and potential treatment options.  I do recommend she obtain CT chest to assess for any distant metastatic spread she is in agreement.  Barring the results of this test we will proceed with chemoradiation I discussed with her the chemotherapeutic options of 5 FU versus capecitabine.  She states that she is clumsy and would not do well with the pump and opts to proceed with the p.o. chemotherapy.  We briefly reviewed the potential side effects.  Otherwise today she has no acute complaints.  She does have some social issues which I will refer her to our financial counselor as well as  to further discuss.      Her family history is significant for a maternal aunt with ovarian cancer and her sister with breast cancer.  Socially, she is a never smoker, does not drink alcohol and denies illicit drug use.  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  She does have adult children.      Review of Systems   Constitutional:  Positive for fatigue. Negative for activity change, appetite change, chills, diaphoresis, fever and unexpected weight change.   Respiratory:  Negative for shortness of breath.    Cardiovascular:  Negative for chest pain.   Gastrointestinal:  Negative for abdominal distention, abdominal pain, anal bleeding, blood in stool, constipation, diarrhea, nausea and vomiting.   Genitourinary:  Negative for difficulty urinating.  "  Musculoskeletal:  Negative for arthralgias, back pain and myalgias.   Skin:  Negative for rash.   Neurological:  Negative for dizziness, weakness, light-headedness and headaches.   Hematological:  Does not bruise/bleed easily.   Psychiatric/Behavioral:  The patient is not nervous/anxious.      History     Oncology History   Colon cancer   10/19/2023 Cancer Staged    Staging form: Colon and Rectum, AJCC 8th Edition  - Clinical stage from 10/19/2023: Stage IIIC (cT4b, cN1a, cM0)     4/30/2025 Initial Diagnosis    Colon cancer           Past Medical History:   Diagnosis Date    Anxiety 2021    Colon cancer 10/2023    Depression 2021    Fibroid 2007       Past Surgical History:   Procedure Laterality Date    COLONOSCOPY N/A 10/19/2023    Procedure: COLONOSCOPY;  Surgeon: Gladys Teixeira MD;  Location: [facility];  Service: Endoscopy;  Laterality: N/A;    ESOPHAGOGASTRODUODENOSCOPY N/A 10/19/2023    Procedure: EGD (ESOPHAGOGASTRODUODENOSCOPY);  Surgeon: Gladys Teixeira MD;  Location: [facility];  Service: Endoscopy;  Laterality: N/A;    HYSTERECTOMY  2007    [facility]; Dr. Suero; re: fibroids/menorrhagia       Family History   Problem Relation Name Age of Onset    Heart attack Mother Celi Lal     Hypertension Mother Celi Lal     Diabetes Father Leo Aguilar     Breast cancer Sister  55    Ovarian cancer Maternal Aunt Linda Lees 62        or cervical?       Review of patient's allergies indicates:   Allergen Reactions    Hydrocodone-acetaminophen     Lipitor [atorvastatin]      Muscle pain     Codeine Rash     Other reaction(s): Unknown    Hydrocodone Rash       Social History[1]    Physical Exam     ECOG SCORE    0 - Fully active-able to carry on all pre-disease performance without restriction          Vitals:  /81   Pulse 66   Temp 98.5 °F (36.9 °C)   Resp 18   Ht 5' 9" (1.753 m)   Wt 91.3 kg (201 lb 4.5 oz)   SpO2 100%   BMI 29.72 kg/m²     Physical Exam:  Physical Exam  Constitutional:      "  General: She is not in acute distress.     Appearance: Normal appearance. She is normal weight. She is not ill-appearing, toxic-appearing or diaphoretic.   HENT:      Head: Normocephalic and atraumatic.   Eyes:      Extraocular Movements: Extraocular movements intact.      Conjunctiva/sclera: Conjunctivae normal.   Cardiovascular:      Rate and Rhythm: Normal rate.   Pulmonary:      Effort: Pulmonary effort is normal.   Skin:     General: Skin is warm.   Neurological:      Mental Status: She is alert and oriented to person, place, and time. Mental status is at baseline.   Psychiatric:         Mood and Affect: Mood normal.         Behavior: Behavior normal.         Thought Content: Thought content normal.        Labs   Labs:  No visits with results within 2 Day(s) from this visit.   Latest known visit with results is:   Lab Visit on 04/17/2025   Component Date Value Ref Range Status    Carcinoembryonic Antigen 04/17/2025 18.7 (H)  <=5.0 ng/mL Final    CEA Normal Range:  Non-Smokers: 0-3.0 ng/mL  Smokers:     0-5.0 ng/mL  The testing method is a chemiluminescent microparticle immunoassay manufactured by [employer] and performed on the [employer] or [employer] system. Values obtained with different assay manufacturers for methods may be different and cannot be used interchangeably.        Pathology   Pathology reviewed, detailed results as follows:   FINAL PATH:  Pelvic Tumor:  POSITIVE for mucinous adenocarcinoma, morphologically consistent with patient's colorectal primary  Rectum & Distla sigmoid colon (26 cm in length) robotic LAR with vaginectomy:  - Moderately-differentiated invasive mucinous adenocarcinoma of sigmoid colon, involving the vaginal mucosa  - Metastatic carcinoma in one of fourty-four lymph nodes (1/44)   PATHOLOGICAL TUMOR STAGE: pT4b pN1a.        Specimen to Pathology Gastrointestinal tract: [id number] - 04/03/2025       Component  Ref Range & Units (hover)  Resulting Agency   Case  Report   [facility]                              Case: [id number]                                 Authorizing Provider:  Neal Seth MD  Collected:           04/03/2025 11:17 AM           Ordering Location:     [facility]  Received:            04/04/2025 12:29 PM                                  [place]                                                                           Pathologist:           Atilio Vila MD                                                         Specimens:   1) - Stomach, Antrum, gastric bx; r/o h pylori                                                      2) - Large intestine, Colon, Rectum, rectal mass bx                                        Clinical Information  [facility]      Diagnosis:  K52.9 - Chronic diarrhea [ICD-10-CM]  Z85.038 - History of colon cancer [ICD-10-CM]  Z86.19 - History of Helicobacter pylori infection [ICD-10-CM]      Final Diagnosis   1. Stomach, Antrum, Biopsy:     - Gastric oxyntic-type mucosa with abundant Helicobacter-like organisms and moderate chronic, active inflammation.  See comment.  - Negative for intestinal metaplasia.  - Negative for dysplasia and malignancy.     Comment:  An immunohistochemical stain (IHC) for Helicobacter pylori is performed with appropriate controls reviewed and is POSITIVE.     2. Rectum, Mass, Biopsy:     - Invasive adenocarcinoma, well differentiated. See Comment.     Comment:  The patient's history of colonic primary invasive adenocarcinoma (BRS-) status post resection and chemotherapy is noted.  The present case demonstrates focal invasive carcinoma arising in a background of high-grade dysplasia and necrosis, consistent with recurrence/persistence of the patient's known colonic primary adenocarcinoma. Immunohistochemical stains (IHC) for CK20 and CDX-2 are performed with appropriate controls reviewed and are POSITIVE. H&E levels are examined.   Electronically signed by Atilio Vila MD  "on 4/9/2025 at 2:38 PM    Gross Description  [facility]   1. Stomach, Antrum: gastric bx; r/o h pylori  MRN # on Epic Label:  [id number]  MRN # on Pathology Label:  [id number]    The specimen is received in formalin labeled "gastric bx; R/O H pylori".  The specimen consists of 1 tan fragment of soft tissue measuring 0.5 x 0.2 x 0.2 cm.  The specimen is submitted entirely in cassette 1A.     Grossing was completed by Luan Ayala on 4/6/2025.     2. Large intestine, Colon, Rectum: rectal mass bx  MRN # on Epic Label: [id number]  MRN # on Pathology Label: [id number]    The specimen is received in formalin labeled "rectal mass bx".  The specimen consists of multiple tan fragments of soft tissue measuring 0.9 x 0.2 x 0.1 cm in aggregate.  The specimen is submitted entirely in cassette 2A.     Grossing was completed by Luan Ayala on 4/6/2025.      Report Footnotes  [facility]   Unless the case is a "gross only" or additional testing only, the final diagnosis for each specimen is based on a microscopic examination of appropriate tissue sections.   Performing Location  [facility]   Grossing performed at [facility], [facility] [place], [address]     Sign out performed at [facility], [id number] [place] [address] [place], [address]   Dx Category Malignant/neoplasm Abnormal  [facility]             Imaging   MRI Rectal Cancer W W/O Contrast - 04/16/2025  FINDINGS:  There is a large mucin containing T2 hyperintense mass centered in the posterior pelvis with epicenter outside of bowel and demonstrates some mass effect upon collapsed rectosigmoid.  It demonstrates diffuse heterogeneous enhancement.  It is significantly reduced in size measuring roughly 4.8 x 4.2 x 4.2 cm compared to approximately 7.0 x 10.5 x 11.3 cm on the prior examination (remeasured by me today).     There is a small rounded right-sided mesorectal lymph node which appears increased in size which measures approximately 6 mm (series 2, image 29).  No " additional enlarged lymph nodes.     Hysterectomy.  Ovaries appear within normal limits.  No ascites.  Bladder mostly collapsed.  No suspicious osseous lesions.     Impression:  There is a large T2 hyperintense mucinous mass with epicenter extrinsic to the bowel as seen on the comparison examination.  It is reduced in size.  There is a small mildly suspicious right-sided mesorectal subcentimeter lymph node.      Assessment and Plan   Hx Stage IIIC (T4b N1a M0) Adenocarcinoma of the Sigmoid Colon (involving the posterior vagina) with now Pelvic Recurrence   Initially diagnosed with locally advanced sigmoid colon cancer involving the posterior vagina s/p Robotic-assisted posterior exenteration/LAR, vaginectomy and cystorrhaphy (12/18/2023 by Dr. Jakub Angulo and Dr. Powers.), with final pathology resultant as  [id number], mucinous adenocarcinoma. Pre treatment CEA 10/04/2023 07.28.  Colorectal TB 04/23/2025:  This represents local persistence/recurrence of disease. Consensus decision to proceed with radiation followed by restaging. She will also benefit from chemotherapy - details of her prior regimen is being ascertained.   CEA 04/17/2025: 18.7  Genetic testing pending   I recommended that we also obtain CT chest to assess for any distant metastatic disease prior to initiating chemoradiation   Depending on imaging results, anticipate proceeding with chemo rads with capecitabine.  The patient reports that she would not do well with the pump after we discussed it.  We reviewed the nature of recurrent colon cancer and necessity of treatment in detail  The patient also informed me that she has a financial balanced with [facility] and I have advised her to speak with our financial counselor and I will also have her seen by our social workers regarding any further assistance she may need      Cancer Screening  MMG 10/09/2024: BI-RADS Category 1: Negative   PAP Smear: Hx of Hysterectomy     Chronic Medical Conditions  GERD  DM  II, non-insulin dependent   HLD  H. Pylori - ?untreated        Med Onc Chart Routing      Follow up with physician 2 weeks. Audio only - review results   Follow up with GUSTAVO    Infusion scheduling note    Injection scheduling note    Labs    Imaging CT chest abdomen pelvis   already scheduled   Pharmacy appointment    Other referrals                > 60 minutes was spent in consultation with the patient, chart review (including labs, imaging, and pathology).    The patient was seen, interviewed and examined. Pertinent lab and radiologic studies were reviewed. Pt instructed to call should they develop concerning signs/symptoms or have further questions.        Portions of the record may have been created with voice recognition software. Occasional wrong-word or sound-a-like substitutions may have occurred due to the inherent limitations of voice recognition software. Read the chart carefully and recognize, using context, where substitutions have occurred.      Tanisha Vines MD    Hematology/Oncology              [1]   Social History  Tobacco Use    Smoking status: Never    Smokeless tobacco: Never   Substance Use Topics    Alcohol use: Never    Drug use: Never

## 2025-05-02 ENCOUNTER — TELEPHONE (OUTPATIENT)
Dept: GYNECOLOGIC ONCOLOGY | Facility: CLINIC | Age: 55
End: 2025-05-02
Payer: COMMERCIAL

## 2025-05-02 NOTE — TELEPHONE ENCOUNTER
----- Message from Renae Perez MD sent at 5/1/2025  2:28 PM CDT -----  Hey! Yes she contacted me. Plans for chemo/RT and then likely surgery in [facility] and she would need my help if so. Happy to see Reji  ----- Message -----  From: Sapphire You RN  Sent: 5/1/2025   2:23 PM CDT  To: Renae Perez MD    Hi,This [facility] patient is being referred to you for recurrent colon cancer. Dr. Sebastian referred her to you, but I am not entirely sure why. I see her imaging notes disease recurrence into pelvic, but tumor board note appears to recommend radiation and chemo @ this time. Is she maybe referring to you for future consideratio of surgical resection? If so, would that make sense when she is being seen by [facility] team? Can you take a look please?Sapphire   Toe gangrene

## 2025-05-06 ENCOUNTER — TELEPHONE (OUTPATIENT)
Dept: GYNECOLOGIC ONCOLOGY | Facility: CLINIC | Age: 55
End: 2025-05-06
Payer: COMMERCIAL

## 2025-05-06 NOTE — NURSING
Oncology Navigation   Intake  Cancer Type: GI  Type of Referral: Internal  Date of Referral: 04/30/25  Initial Nurse Navigator Contact: 04/21/25  Referral to Initial Contact Timeline (days): 0  First Appointment Available: 05/08/25  Appointment Date: 06/16/25  First Available Date vs. Scheduled Date (days): 39  Multiple appointments: No  Reason if booked > 7 days after scheduling: Patient request     Treatment  Referring Provider: Claudia Sebastian MD  Referring Diagnosis: Recurrent Colon Cancer    Note: 56 y/o F recently discovered to have pelvic recurrence, of Stage IIIC adenocarcinoma of sigmoid colon, initially diagnosed in October, 2023. Plan is for pt to   proceed with radiotherapy and chemosensitization. Records indicate that she received adjuvant chemotherapy however extent of treatment is unclear. Based on 4/23/25 tumor board noted, further details regarding initial treatment is being obtained. Patient referred to Dr. Perez for possibility of collaborative surgical intervention.     PMH:   Colon Cancer  Depression  Anxiety  Fibroids    Abdominal/Pelvic Surgeries:  Hysterectomy ([facility] in 2007 w/Dr. Suero for fibroids/menorrhagia)  Sigmoid colectomy with end to side colorectal anastomosis partial cystectomy and partial vaginectomy. ([facility])  Posterior Exenteration/Rectosigmoid resection with coloproctostomy.   Robotic GYN extenteration by Dr. Jakub Angulo.   Procedure 3: Robotic cystorraphy and bilaterla stent placement bu Dr. Small on 12/18/2023.     Labs:  CEA--4/17/25    18.7    Pathology/Cytology:  Stomach, Antrum, Biopsy-- 4/3/25   - Gastric oxyntic-type mucosa with abundant Helicobacter-like organisms and moderate chronic, active inflammation.  See comment.  - Negative for intestinal metaplasia.  - Negative for dysplasia and malignancy.     Rectum, Mass, Biopsy- 4/3/25   - Invasive adenocarcinoma, well differentiated. See Comment.    FINAL PATH:  Pelvic Tumor:  POSITIVE for mucinous  adenocarcinoma, morphologically consistent with patient's colorectal primary  Rectum & Distla sigmoid colon (26 cm in length) robotic LAR with vaginectomy:  - Moderately-differentiated invasive mucinous adenocarcinoma of sigmoid colon, involving the vaginal mucosa  - Metastatic carcinoma in one of fourty-four lymph nodes (1/44)   PATHOLOGICAL TUMOR STAGE: pT4b pN1a.     Imaging:    CT C/A/P-- 5/8/25     Pending results  MRI Rectal Cancer Protocol-- 4/16/25  Impression:   There is a large T2 hyperintense mucinous mass centered in the posterior pelvis with epicenter extrinsic to the bowel as seen on the comparison examination.  It is reduced in size.     There is a small mildly suspicious right-sided mesorectal subcentimeter lymph node.     Acuity      Follow Up  No follow-ups on file.

## 2025-05-07 ENCOUNTER — TELEPHONE (OUTPATIENT)
Dept: HEMATOLOGY/ONCOLOGY | Facility: CLINIC | Age: 55
End: 2025-05-07
Payer: COMMERCIAL

## 2025-05-08 ENCOUNTER — PATIENT MESSAGE (OUTPATIENT)
Dept: ADMINISTRATIVE | Facility: OTHER | Age: 55
End: 2025-05-08
Payer: COMMERCIAL

## 2025-05-08 ENCOUNTER — HOSPITAL ENCOUNTER (OUTPATIENT)
Dept: RADIATION THERAPY | Facility: HOSPITAL | Age: 55
Discharge: HOME | End: 2025-05-08
Attending: INTERNAL MEDICINE
Payer: COMMERCIAL

## 2025-05-08 ENCOUNTER — HOSPITAL ENCOUNTER (OUTPATIENT)
Dept: RADIOLOGY | Facility: HOSPITAL | Age: 55
Discharge: HOME | End: 2025-05-08
Attending: INTERNAL MEDICINE
Payer: COMMERCIAL

## 2025-05-08 DIAGNOSIS — C19 RECURRENT COLORECTAL CANCER: ICD-10-CM

## 2025-05-08 PROCEDURE — 74177 CT ABD & PELVIS W/CONTRAST: CPT | Mod: TC,PN

## 2025-05-08 PROCEDURE — 74177 CT ABD & PELVIS W/CONTRAST: CPT | Mod: 26,,, | Performed by: RADIOLOGY

## 2025-05-08 PROCEDURE — 71260 CT THORAX DX C+: CPT | Mod: 26,,, | Performed by: RADIOLOGY

## 2025-05-08 PROCEDURE — 25500020 PHARM REV CODE 255: Mod: PN | Performed by: INTERNAL MEDICINE

## 2025-05-08 PROCEDURE — A9698 NON-RAD CONTRAST MATERIALNOC: HCPCS | Mod: PN | Performed by: INTERNAL MEDICINE

## 2025-05-08 RX ADMIN — IOHEXOL 1000 ML: 12 SOLUTION ORAL at 09:05

## 2025-05-08 RX ADMIN — IOHEXOL 100 ML: 350 INJECTION, SOLUTION INTRAVENOUS at 09:05

## 2025-05-09 RX ORDER — CAPECITABINE 500 MG/1
1500 TABLET, FILM COATED ORAL 2 TIMES DAILY
Qty: 150 TABLET | Refills: 0 | Status: SHIPPED | OUTPATIENT
Start: 2025-05-09 | End: 2026-05-09

## 2025-05-09 RX ORDER — CAPECITABINE 150 MG/1
300 TABLET, FILM COATED ORAL 2 TIMES DAILY
Qty: 125 TABLET | Refills: 0 | Status: SHIPPED | OUTPATIENT
Start: 2025-05-09 | End: 2026-05-09

## 2025-05-14 DIAGNOSIS — C19 RECURRENT COLORECTAL CANCER: Primary | ICD-10-CM

## 2025-05-15 ENCOUNTER — OFFICE VISIT (OUTPATIENT)
Dept: HEMATOLOGY/ONCOLOGY | Facility: CLINIC | Age: 55
End: 2025-05-15
Payer: COMMERCIAL

## 2025-05-15 DIAGNOSIS — C18.7 MALIGNANT NEOPLASM OF SIGMOID COLON: ICD-10-CM

## 2025-05-15 DIAGNOSIS — C19 RECURRENT COLORECTAL CANCER: Primary | ICD-10-CM

## 2025-05-15 DIAGNOSIS — Z79.899 MEDICATION MANAGEMENT: ICD-10-CM

## 2025-05-15 RX ORDER — CAPECITABINE 500 MG/1
1000 TABLET, FILM COATED ORAL 2 TIMES DAILY
Qty: 700 TABLET | Refills: 0 | Status: SHIPPED | OUTPATIENT
Start: 2025-05-15

## 2025-05-15 RX ORDER — ONDANSETRON 8 MG/1
8 TABLET, ORALLY DISINTEGRATING ORAL EVERY 8 HOURS PRN
Qty: 90 TABLET | Refills: 3 | Status: SHIPPED | OUTPATIENT
Start: 2025-05-15

## 2025-05-15 RX ORDER — CAPECITABINE 150 MG/1
750 TABLET, FILM COATED ORAL 2 TIMES DAILY
Qty: 1750 TABLET | Refills: 0 | Status: SHIPPED | OUTPATIENT
Start: 2025-05-15

## 2025-05-15 NOTE — PROGRESS NOTES
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 Jakub Powers   Adjuvant Chemotherapy with?  CAPOX x7 cycles (February 2024 - August 2024)  Current Treatment: Chemorads    The patient location is: [place], [place]  The chief complaint leading to consultation is: follow up    Visit type: audiovisual    Medical discussion time during audio only encounter: 11 minutes  15 minutes of total time spent on the encounter, which includes face to face time and non-face to face time preparing to see the patient (eg, review of tests), Obtaining and/or reviewing separately obtained history, Documenting clinical information in the electronic or other health record, Independently interpreting results (not separately reported) and communicating results to the patient/family/caregiver, or Care coordination (not separately reported).         Each patient to whom he or she provides medical services by telemedicine is:  (1) informed of the relationship between the physician and patient and the respective role of any other health care provider with respect to management of the patient; and (2) notified that he or she may decline to receive medical services by telemedicine and may withdraw from such care at any time.    Notes:   Subjective   The patient presents for follow up.  We reviewed her imaging which showed no distant metastatic disease.  We reviewed the overall treatment plan and she is in agreement.  She also inquires about her [place] paper work and I will get with our staff regarding this. Otherwise, she  has no acute complaints.      Review of Systems   Constitutional:  Positive for fatigue. Negative for activity change, appetite change, chills, diaphoresis, fever and unexpected weight change.   Respiratory:  Negative for shortness of breath.    Cardiovascular:  Negative for chest pain.   Gastrointestinal:  Negative for abdominal distention, abdominal pain, anal bleeding, blood in stool, constipation, diarrhea, nausea and vomiting.   Genitourinary:  Negative for difficulty urinating.   Musculoskeletal:  Negative for arthralgias, back pain and myalgias.   Skin:  Negative for rash.   Neurological:  Negative for dizziness, weakness, light-headedness and headaches.   Hematological:  Does not bruise/bleed easily.   Psychiatric/Behavioral:  The patient is not nervous/anxious.      History   HPI  Kaylan Lal is a 55 y.o. female with history of stage IIIC adenocarcinoma of the sigmoid colon with treatment history as documented above, GERD, DM II, non-insulin dependent, HLD and H. Pylori - ?untreated who presents to clinic to establish care after noted to have pelvic recurrence.      She initially presented with persistent vaginal discharge and was evaluated by her OBGYN physician who then referred her to Gastroenterology for colonoscopy.  Colonoscopy was done 10/19/2023 she was noted to have a mass above the rectosigmoid area.  Biopsy pathology resulted as well-differentiated adenocarcinoma.  MRI of the pelvis 11/11/2023 showed an 11.3 x 6.4 x 7.2 cm mass with mucinous appearing features that invaded the vaginal cuff and adjacent vaginal apex.  She subsequently underwent robotic-assisted posterior exenteration/LAR, vaginectomy and cystorrhaphy 12/18/2023 by Dr. Jakub Angulo and Dr. Powers.  Final path resulted as invasive mucinous adenocarcinoma, moderately differentiated, involving the vaginal mucosa (pT4b pN1a).        She initially was seen at [facility] with Dr. Teague and reports receiving 1 cycle of  chemotherapy there but unsure what she received; she then transferred care to Dr. Dorantes.  At that time the plan was for treatment with 7 cycles of CAPEOX followed by Signatera testing.  On review of her record it states that she is receiving Kisqali/fulvestrant/Xgeva in the patient is unsure of what she received.  She does remember taking a chemotherapy pill and infusion.  I informed her that this is likely a typographical error however we will obtain the floor record to try to confirmed.    We reviewed in detail her imaging as well as the recommended treatment plan in tumor board consensus.  We discussed the nature of recurrent colon cancer and stage IV disease and potential treatment options.  I do recommend she obtain CT chest to assess for any distant metastatic spread she is in agreement.  Barring the results of this test we will proceed with chemoradiation I discussed with her the chemotherapeutic options of 5 FU versus capecitabine.  She states that she is clumsy and would not do well with the pump and opts to proceed with the p.o. chemotherapy.  We briefly reviewed the potential side effects.  Otherwise today she has no acute complaints.  She does have some social issues which I will refer her to our financial counselor as well as  to further discuss.      Her family history is significant for a maternal aunt with ovarian cancer and her sister with breast cancer.  Socially, she is a never smoker, does not drink alcohol and denies illicit drug use.  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  She does have adult children.      Oncology History   Colon cancer   10/19/2023 Cancer Staged    Staging form: Colon and Rectum, AJCC 8th Edition  - Clinical stage from 10/19/2023: Stage IIIC (cT4b, cN1a, cM0)     4/30/2025 Initial Diagnosis    Colon cancer     5/15/2025 -  Chemotherapy    Treatment Summary   Plan Name: OP CAPECITABINE 5 DAYS + RADIOTHERAPY  Treatment  Goal: Curative  Status: Active  Start Date: 5/15/2025  End Date: 5/15/2025  Provider: Tanisha Diaz MD  Chemotherapy: capecitabine (XELODA) tablet 1,750 mg, 825 mg/m2 = 1,750 mg, Oral, 2 times daily, 1 of 1 cycle, Start date: 5/15/2025, End date: --           Past Medical History:   Diagnosis Date    Anxiety 2021    Colon cancer 10/2023    Depression 2021    Fibroid 2007       Past Surgical History:   Procedure Laterality Date    COLONOSCOPY N/A 10/19/2023    Procedure: COLONOSCOPY;  Surgeon: Gladys Teixeira MD;  Location: [facility];  Service: Endoscopy;  Laterality: N/A;    ESOPHAGOGASTRODUODENOSCOPY N/A 10/19/2023    Procedure: EGD (ESOPHAGOGASTRODUODENOSCOPY);  Surgeon: Gladys Teixeira MD;  Location: [facility];  Service: Endoscopy;  Laterality: N/A;    HYSTERECTOMY  2007    [facility]; Dr. Suero; re: fibroids/menorrhagia       Family History   Problem Relation Name Age of Onset    Heart attack Mother Celi Lal     Hypertension Mother Celi Lal     Diabetes Father Leo Aguilar     Breast cancer Sister  55    Ovarian cancer Maternal Aunt Linda Lees 62        or cervical?       Review of patient's allergies indicates:   Allergen Reactions    Hydrocodone-acetaminophen     Lipitor [atorvastatin]      Muscle pain     Codeine Rash     Other reaction(s): Unknown    Hydrocodone Rash       Social History[1]   Labs   Labs:  No visits with results within 2 Day(s) from this visit.   Latest known visit with results is:   Lab Visit on 04/17/2025   Component Date Value Ref Range Status    Carcinoembryonic Antigen 04/17/2025 18.7 (H)  <=5.0 ng/mL Final    CEA Normal Range:  Non-Smokers: 0-3.0 ng/mL  Smokers:     0-5.0 ng/mL  The testing method is a chemiluminescent microparticle immunoassay manufactured by [employer] and performed on the MoPals or Tradoria system. Values obtained with different assay manufacturers for methods may be different and cannot be used interchangeably.        Pathology    Pathology reviewed, detailed results as follows:   FINAL PATH:  Pelvic Tumor:  POSITIVE for mucinous adenocarcinoma, morphologically consistent with patient's colorectal primary  Rectum & Distla sigmoid colon (26 cm in length) robotic LAR with vaginectomy:  - Moderately-differentiated invasive mucinous adenocarcinoma of sigmoid colon, involving the vaginal mucosa  - Metastatic carcinoma in one of fourty-four lymph nodes (1/44)   PATHOLOGICAL TUMOR STAGE: pT4b pN1a.        Specimen to Pathology Gastrointestinal tract: [id number] - 04/03/2025       Component  Ref Range & Units (hover)  Resulting Agency   Case Report   [facility]                              Case: [id number]                                 Authorizing Provider:  Neal Seth MD  Collected:           04/03/2025 11:17 AM           Ordering Location:     [facility]  Received:            04/04/2025 12:29 PM                                  [place]                                                                           Pathologist:           Atilio Vila MD                                                         Specimens:   1) - Stomach, Antrum, gastric bx; r/o h pylori                                                      2) - Large intestine, Colon, Rectum, rectal mass bx                                        Clinical Information  [facility]      Diagnosis:  K52.9 - Chronic diarrhea [ICD-10-CM]  Z85.038 - History of colon cancer [ICD-10-CM]  Z86.19 - History of Helicobacter pylori infection [ICD-10-CM]      Final Diagnosis   1. Stomach, Antrum, Biopsy:     - Gastric oxyntic-type mucosa with abundant Helicobacter-like organisms and moderate chronic, active inflammation.  See comment.  - Negative for intestinal metaplasia.  - Negative for dysplasia and malignancy.     Comment:  An immunohistochemical stain (IHC) for Helicobacter pylori is performed with appropriate controls reviewed and is POSITIVE.     2. Rectum, Mass,  "Biopsy:     - Invasive adenocarcinoma, well differentiated. See Comment.     Comment:  The patient's history of colonic primary invasive adenocarcinoma (CZU-) status post resection and chemotherapy is noted.  The present case demonstrates focal invasive carcinoma arising in a background of high-grade dysplasia and necrosis, consistent with recurrence/persistence of the patient's known colonic primary adenocarcinoma. Immunohistochemical stains (IHC) for CK20 and CDX-2 are performed with appropriate controls reviewed and are POSITIVE. H&E levels are examined.   Electronically signed by Atilio Vila MD on 4/9/2025 at 2:38 PM    Gross Description  [facility]   1. Stomach, Antrum: gastric bx; r/o h pylori  MRN # on Epic Label:  [id number]  MRN # on Pathology Label:  [id number]    The specimen is received in formalin labeled "gastric bx; R/O H pylori".  The specimen consists of 1 tan fragment of soft tissue measuring 0.5 x 0.2 x 0.2 cm.  The specimen is submitted entirely in cassette 1A.     Grossing was completed by Luan Ayala on 4/6/2025.     2. Large intestine, Colon, Rectum: rectal mass bx  MRN # on Epic Label: [id number]  MRN # on Pathology Label: [id number]    The specimen is received in formalin labeled "rectal mass bx".  The specimen consists of multiple tan fragments of soft tissue measuring 0.9 x 0.2 x 0.1 cm in aggregate.  The specimen is submitted entirely in cassette 2A.     Grossing was completed by Luan Ayala on 4/6/2025.      Report Footnotes  [facility]   Unless the case is a "gross only" or additional testing only, the final diagnosis for each specimen is based on a microscopic examination of appropriate tissue sections.   Performing Location  [facility]   Grossing performed at [facility], [address] [place], [address]     Sign out performed at [facility], [id number] [place] [address] Dr., [place], [address]   Dx Category Malignant/neoplasm Abnormal  [facility]             Imaging "   MRI Rectal Cancer W W/O Contrast - 04/16/2025  FINDINGS:  There is a large mucin containing T2 hyperintense mass centered in the posterior pelvis with epicenter outside of bowel and demonstrates some mass effect upon collapsed rectosigmoid.  It demonstrates diffuse heterogeneous enhancement.  It is significantly reduced in size measuring roughly 4.8 x 4.2 x 4.2 cm compared to approximately 7.0 x 10.5 x 11.3 cm on the prior examination (remeasured by me today).  There is a small rounded right-sided mesorectal lymph node which appears increased in size which measures approximately 6 mm (series 2, image 29).  No additional enlarged lymph nodes.  Hysterectomy.  Ovaries appear within normal limits.  No ascites.  Bladder mostly collapsed.  No suspicious osseous lesions.     Impression:  There is a large T2 hyperintense mucinous mass with epicenter extrinsic to the bowel as seen on the comparison examination.  It is reduced in size.  There is a small mildly suspicious right-sided mesorectal subcentimeter lymph node.        CT Chest Abdomen Pelvis With IV Contrast (XPD) Routine Oral Contrast - 05/08/2025  Impression:  Compared to 09/09/2024, suggestion of interval progression of disease with enlargement of the perirectal mass now 4.8 x 3.7 cm, previously 3.6 x 3.3 cm as remeasured on prior exam from 09/09/2024. Additionally, there is suggestion of enlargement of an intraluminal component of the mass, difficult to quantify due to the presence of stool. Otherwise, the rectal anastomosis appears intact without evidence of obstruction. There are multiple nonspecific subcentimeter short axis mesorectal and pelvic lymph nodes, somewhat suspicious.  No evidence of distant spread of metastatic disease in the upper abdomen or chest.    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 Jakub Angulo and Dr. Powers.), with final pathology resultant as  [id number], mucinous adenocarcinoma. Pre treatment CEA 10/04/2023 07.28.  Colorectal TB 04/23/2025:  This represents local persistence/recurrence of disease. Consensus decision to proceed with radiation followed by restaging. She will also benefit from chemotherapy - details of her prior regimen is being ascertained.   CEA 04/17/2025: 18.7  Plan  Genetic testing pending  - referred to genetic counselor  CT CAP 05/08/25 showed no distant metastatic disease  Plan for chemoradiation with capecitabine - discussed with patient potential SEs and instructions on administration  Capecitabine prescription sent  I will plan to see her back after she starts radiation       Cancer Screening  MMG 10/09/2024: BI-RADS Category 1: Negative   PAP Smear: Hx of Hysterectomy       Chronic Medical Conditions  GERD  DM II, non-insulin dependent   HLD  H. Pylori - ?untreated        Med Onc Chart Routing      Follow up with physician 2 weeks. If she has started radiation   Follow up with GUSTAVO    Infusion scheduling note    Injection scheduling note    Labs CBC, CMP, phosphorus and magnesium   Scheduling:  Preferred lab:  Lab interval:     Imaging    Pharmacy appointment    Other referrals                The patient was seen, interviewed and examined. Pertinent lab and radiologic studies were reviewed. Pt instructed to call should they develop concerning signs/symptoms or have further questions.        Portions of the record may have been created with voice recognition software. Occasional wrong-word or sound-a-like substitutions may have occurred due to the inherent limitations of voice recognition software. Read the chart carefully and recognize, using context, where substitutions have occurred.      Tanisha Vines MD     Hematology/Oncology              [1]   Social History  Tobacco Use    Smoking status: Never    Smokeless tobacco: Never   Substance Use Topics    Alcohol use: Never    Drug use: Never

## 2025-05-16 ENCOUNTER — LAB VISIT (OUTPATIENT)
Dept: LAB | Facility: HOSPITAL | Age: 55
End: 2025-05-16
Attending: INTERNAL MEDICINE
Payer: COMMERCIAL

## 2025-05-16 ENCOUNTER — TELEPHONE (OUTPATIENT)
Dept: PHARMACY | Facility: CLINIC | Age: 55
End: 2025-05-16
Payer: COMMERCIAL

## 2025-05-16 DIAGNOSIS — C19 RECURRENT COLORECTAL CANCER: ICD-10-CM

## 2025-05-16 PROBLEM — Z79.899 MEDICATION MANAGEMENT: Status: ACTIVE | Noted: 2025-05-16

## 2025-05-16 PROCEDURE — 36415 COLL VENOUS BLD VENIPUNCTURE: CPT

## 2025-05-16 NOTE — TELEPHONE ENCOUNTER
[facility] Chart Review & Patient Outreach Details For Medication Adherence Project    Reason for Outreach Encounter: 3rd Party payor non-compliance report ([employer], [employer], [employer], etc)  2.  Patient Outreach Method: Reviewed patient chart   3.   Medication in question:    Diabetes Medications              SITagliptin phosphate (JANUVIA) 100 MG Tab Take 1 tablet (100 mg total) by mouth once daily.                 LF 30 ds 5/12/25  Ozempic dc'ed    4.  Reviewed and or Updates Made To: Patient Chart  5. Outreach Outcomes and/or actions taken: Patient filled medication and is on track to be adherent and Medication discontinued  Additional Notes:

## 2025-05-20 ENCOUNTER — TELEPHONE (OUTPATIENT)
Dept: HEMATOLOGY/ONCOLOGY | Facility: CLINIC | Age: 55
End: 2025-05-20
Payer: COMMERCIAL

## 2025-05-20 DIAGNOSIS — C19 RECURRENT COLORECTAL CANCER: Primary | ICD-10-CM

## 2025-05-20 NOTE — TELEPHONE ENCOUNTER
N/a lvm to inform pt of scheduled appt. Nurse advised pt to call back or send a message through the portal with any questions or concerns.

## 2025-05-21 ENCOUNTER — DOCUMENTATION ONLY (OUTPATIENT)
Dept: HEMATOLOGY/ONCOLOGY | Facility: CLINIC | Age: 55
End: 2025-05-21
Payer: COMMERCIAL

## 2025-05-21 NOTE — PROGRESS NOTES
POLINA consulted to assist with $130 co pay for cost of Capecitabine so that pt can receive rx in time for treatment. POLINA completed [facility] and sent copy to Fredy with [facility] and she stated that she would inform the pt.

## 2025-05-23 ENCOUNTER — TELEPHONE (OUTPATIENT)
Dept: HEMATOLOGY/ONCOLOGY | Facility: CLINIC | Age: 55
End: 2025-05-23
Payer: COMMERCIAL

## 2025-05-23 LAB
ONEOME COMMENT: NORMAL
ONEOME METHOD: NORMAL

## 2025-05-23 NOTE — TELEPHONE ENCOUNTER
Primary Information    Communication   Type:  Needs Medical Advice   Who Called: pt   Symptoms (please be specific): na   How long has patient had these symptoms:  na   Pharmacy name and phone #:  na   Would the patient rather a call back or a response via [facility]? call   Best Call Back Number: [phone]   Additional Information: requesting to speak with office as she hasn't received her FMLA papers that were supposed to be emailed to her             Attempted to reach pt NO ANS

## 2025-05-27 ENCOUNTER — DOCUMENTATION ONLY (OUTPATIENT)
Dept: RADIATION ONCOLOGY | Facility: CLINIC | Age: 55
End: 2025-05-27
Payer: COMMERCIAL

## 2025-05-29 DIAGNOSIS — C19 RECURRENT COLORECTAL CANCER: Primary | ICD-10-CM

## 2025-05-29 RX ORDER — DIPHENOXYLATE HYDROCHLORIDE AND ATROPINE SULFATE 2.5; .025 MG/1; MG/1
1 TABLET ORAL 4 TIMES DAILY PRN
Qty: 80 TABLET | Refills: 0 | Status: SHIPPED | OUTPATIENT
Start: 2025-05-29 | End: 2025-06-18

## 2025-06-02 ENCOUNTER — HOSPITAL ENCOUNTER (OUTPATIENT)
Dept: RADIATION THERAPY | Facility: HOSPITAL | Age: 55
Discharge: HOME | End: 2025-06-02
Attending: SPECIALIST
Payer: COMMERCIAL

## 2025-06-02 ENCOUNTER — LAB VISIT (OUTPATIENT)
Dept: LAB | Facility: HOSPITAL | Age: 55
End: 2025-06-02
Attending: INTERNAL MEDICINE
Payer: COMMERCIAL

## 2025-06-02 ENCOUNTER — OFFICE VISIT (OUTPATIENT)
Dept: HEMATOLOGY/ONCOLOGY | Facility: CLINIC | Age: 55
End: 2025-06-02
Payer: COMMERCIAL

## 2025-06-02 VITALS — SYSTOLIC BLOOD PRESSURE: 92 MMHG | HEART RATE: 99 BPM | DIASTOLIC BLOOD PRESSURE: 62 MMHG | TEMPERATURE: 98 F

## 2025-06-02 DIAGNOSIS — C19 RECURRENT COLORECTAL CANCER: Primary | ICD-10-CM

## 2025-06-02 DIAGNOSIS — K52.1 CHEMOTHERAPY INDUCED DIARRHEA: ICD-10-CM

## 2025-06-02 DIAGNOSIS — T45.1X5A CHEMOTHERAPY-INDUCED FATIGUE: ICD-10-CM

## 2025-06-02 DIAGNOSIS — C19 RECURRENT COLORECTAL CANCER: ICD-10-CM

## 2025-06-02 DIAGNOSIS — C18.7 MALIGNANT NEOPLASM OF SIGMOID COLON: ICD-10-CM

## 2025-06-02 DIAGNOSIS — T45.1X5A IMMUNODEFICIENCY DUE TO CHEMOTHERAPY: ICD-10-CM

## 2025-06-02 DIAGNOSIS — Z79.69 IMMUNODEFICIENCY DUE TO CHEMOTHERAPY: ICD-10-CM

## 2025-06-02 DIAGNOSIS — R53.83 CHEMOTHERAPY-INDUCED FATIGUE: ICD-10-CM

## 2025-06-02 DIAGNOSIS — Z79.899 MEDICATION MANAGEMENT: ICD-10-CM

## 2025-06-02 DIAGNOSIS — D84.821 IMMUNODEFICIENCY DUE TO CHEMOTHERAPY: ICD-10-CM

## 2025-06-02 DIAGNOSIS — T45.1X5A CHEMOTHERAPY INDUCED DIARRHEA: ICD-10-CM

## 2025-06-02 LAB
ABSOLUTE EOSINOPHIL (OHS): 0.09 K/UL
ABSOLUTE MONOCYTE (OHS): 0.32 K/UL (ref 0.3–1)
ABSOLUTE NEUTROPHIL COUNT (OHS): 1.83 K/UL (ref 1.8–7.7)
ALBUMIN SERPL BCP-MCNC: 3.3 G/DL (ref 3.5–5.2)
ALP SERPL-CCNC: 94 UNIT/L (ref 40–150)
ALT SERPL W/O P-5'-P-CCNC: 11 UNIT/L (ref 10–44)
ANION GAP (OHS): 8 MMOL/L (ref 8–16)
AST SERPL-CCNC: 13 UNIT/L (ref 11–45)
BASOPHILS # BLD AUTO: 0.01 K/UL
BASOPHILS NFR BLD AUTO: 0.3 %
BILIRUB SERPL-MCNC: 0.2 MG/DL (ref 0.1–1)
BUN SERPL-MCNC: 11 MG/DL (ref 6–20)
CALCIUM SERPL-MCNC: 9.2 MG/DL (ref 8.7–10.5)
CHLORIDE SERPL-SCNC: 107 MMOL/L (ref 95–110)
CO2 SERPL-SCNC: 24 MMOL/L (ref 23–29)
CREAT SERPL-MCNC: 0.8 MG/DL (ref 0.5–1.4)
ERYTHROCYTE [DISTWIDTH] IN BLOOD BY AUTOMATED COUNT: 15.9 % (ref 11.5–14.5)
GFR SERPLBLD CREATININE-BSD FMLA CKD-EPI: >60 ML/MIN/1.73/M2
GLUCOSE SERPL-MCNC: 90 MG/DL (ref 70–110)
HCT VFR BLD AUTO: 32.6 % (ref 37–48.5)
HGB BLD-MCNC: 9.9 GM/DL (ref 12–16)
IMM GRANULOCYTES # BLD AUTO: 0.02 K/UL (ref 0–0.04)
IMM GRANULOCYTES NFR BLD AUTO: 0.6 % (ref 0–0.5)
LYMPHOCYTES # BLD AUTO: 1.06 K/UL (ref 1–4.8)
MAGNESIUM SERPL-MCNC: 1.8 MG/DL (ref 1.6–2.6)
MCH RBC QN AUTO: 25 PG (ref 27–31)
MCHC RBC AUTO-ENTMCNC: 30.4 G/DL (ref 32–36)
MCV RBC AUTO: 82 FL (ref 82–98)
NUCLEATED RBC (/100WBC) (OHS): 0 /100 WBC
PHOSPHATE SERPL-MCNC: 3.1 MG/DL (ref 2.7–4.5)
PLATELET # BLD AUTO: 266 K/UL (ref 150–450)
PMV BLD AUTO: 9.3 FL (ref 9.2–12.9)
POTASSIUM SERPL-SCNC: 4.1 MMOL/L (ref 3.5–5.1)
PROT SERPL-MCNC: 7.1 GM/DL (ref 6–8.4)
RBC # BLD AUTO: 3.96 M/UL (ref 4–5.4)
RELATIVE EOSINOPHIL (OHS): 2.7 %
RELATIVE LYMPHOCYTE (OHS): 31.8 % (ref 18–48)
RELATIVE MONOCYTE (OHS): 9.6 % (ref 4–15)
RELATIVE NEUTROPHIL (OHS): 55 % (ref 38–73)
SODIUM SERPL-SCNC: 139 MMOL/L (ref 136–145)
WBC # BLD AUTO: 3.33 K/UL (ref 3.9–12.7)

## 2025-06-02 PROCEDURE — 3066F NEPHROPATHY DOC TX: CPT | Mod: CPTII,S$GLB,, | Performed by: INTERNAL MEDICINE

## 2025-06-02 PROCEDURE — 85025 COMPLETE CBC W/AUTO DIFF WBC: CPT

## 2025-06-02 PROCEDURE — 83735 ASSAY OF MAGNESIUM: CPT

## 2025-06-02 PROCEDURE — 77014 PR  CT GUIDANCE PLACEMENT RAD THERAPY FIELDS: CPT | Mod: 26,,, | Performed by: SPECIALIST

## 2025-06-02 PROCEDURE — 82040 ASSAY OF SERUM ALBUMIN: CPT

## 2025-06-02 PROCEDURE — 99215 OFFICE O/P EST HI 40 MIN: CPT | Mod: S$GLB,,, | Performed by: INTERNAL MEDICINE

## 2025-06-02 PROCEDURE — 36415 COLL VENOUS BLD VENIPUNCTURE: CPT

## 2025-06-02 PROCEDURE — 3044F HG A1C LEVEL LT 7.0%: CPT | Mod: CPTII,S$GLB,, | Performed by: INTERNAL MEDICINE

## 2025-06-02 PROCEDURE — 4010F ACE/ARB THERAPY RXD/TAKEN: CPT | Mod: CPTII,S$GLB,, | Performed by: INTERNAL MEDICINE

## 2025-06-02 PROCEDURE — 3078F DIAST BP <80 MM HG: CPT | Mod: CPTII,S$GLB,, | Performed by: INTERNAL MEDICINE

## 2025-06-02 PROCEDURE — 3074F SYST BP LT 130 MM HG: CPT | Mod: CPTII,S$GLB,, | Performed by: INTERNAL MEDICINE

## 2025-06-02 PROCEDURE — 1159F MED LIST DOCD IN RCRD: CPT | Mod: CPTII,S$GLB,, | Performed by: INTERNAL MEDICINE

## 2025-06-02 PROCEDURE — 77386 HC IMRT, COMPLEX: CPT | Performed by: SPECIALIST

## 2025-06-02 PROCEDURE — 84100 ASSAY OF PHOSPHORUS: CPT

## 2025-06-02 PROCEDURE — 99999 PR PBB SHADOW E&M-EST. PATIENT-LVL IV: CPT | Mod: PBBFAC,,, | Performed by: INTERNAL MEDICINE

## 2025-06-02 PROCEDURE — 3061F NEG MICROALBUMINURIA REV: CPT | Mod: CPTII,S$GLB,, | Performed by: INTERNAL MEDICINE

## 2025-06-03 ENCOUNTER — DOCUMENTATION ONLY (OUTPATIENT)
Dept: RADIATION ONCOLOGY | Facility: CLINIC | Age: 55
End: 2025-06-03
Payer: COMMERCIAL

## 2025-06-03 ENCOUNTER — PATIENT MESSAGE (OUTPATIENT)
Dept: HEMATOLOGY/ONCOLOGY | Facility: CLINIC | Age: 55
End: 2025-06-03
Payer: COMMERCIAL

## 2025-06-03 DIAGNOSIS — C19 RECURRENT COLORECTAL CANCER: Primary | ICD-10-CM

## 2025-06-03 PROCEDURE — 77336 RADIATION PHYSICS CONSULT: CPT | Performed by: SPECIALIST

## 2025-06-03 PROCEDURE — 77386 HC IMRT, COMPLEX: CPT | Performed by: SPECIALIST

## 2025-06-03 PROCEDURE — 77014 PR  CT GUIDANCE PLACEMENT RAD THERAPY FIELDS: CPT | Mod: 26,,, | Performed by: SPECIALIST

## 2025-06-04 PROCEDURE — 77014 PR  CT GUIDANCE PLACEMENT RAD THERAPY FIELDS: CPT | Mod: 26,,, | Performed by: SPECIALIST

## 2025-06-04 PROCEDURE — 77386 HC IMRT, COMPLEX: CPT | Performed by: SPECIALIST

## 2025-06-10 ENCOUNTER — DOCUMENTATION ONLY (OUTPATIENT)
Dept: RADIATION ONCOLOGY | Facility: CLINIC | Age: 55
End: 2025-06-10
Payer: COMMERCIAL

## 2025-06-11 ENCOUNTER — PATIENT MESSAGE (OUTPATIENT)
Dept: ADMINISTRATIVE | Facility: OTHER | Age: 55
End: 2025-06-11
Payer: COMMERCIAL

## 2025-06-11 DIAGNOSIS — C18.7 MALIGNANT NEOPLASM OF SIGMOID COLON: ICD-10-CM

## 2025-06-11 RX ORDER — CAPECITABINE 150 MG/1
300 TABLET, FILM COATED ORAL 2 TIMES DAILY
Qty: 28 TABLET | Refills: 1 | Status: ACTIVE | OUTPATIENT
Start: 2025-06-11 | End: 2025-06-25

## 2025-06-11 RX ORDER — CAPECITABINE 500 MG/1
1000 TABLET, FILM COATED ORAL 2 TIMES DAILY
Qty: 28 TABLET | Refills: 1 | Status: ACTIVE | OUTPATIENT
Start: 2025-06-11 | End: 2025-06-25

## 2025-06-12 DIAGNOSIS — C19 RECURRENT COLORECTAL CANCER: Primary | ICD-10-CM

## 2025-06-16 DIAGNOSIS — C18.7 MALIGNANT NEOPLASM OF SIGMOID COLON: Primary | ICD-10-CM

## 2025-06-16 RX ORDER — CAPECITABINE 500 MG/1
500 TABLET, FILM COATED ORAL 2 TIMES DAILY
Qty: 28 TABLET | Refills: 0 | Status: ACTIVE | OUTPATIENT
Start: 2025-06-16 | End: 2025-06-30

## 2025-06-17 ENCOUNTER — DOCUMENTATION ONLY (OUTPATIENT)
Dept: HEMATOLOGY/ONCOLOGY | Facility: CLINIC | Age: 55
End: 2025-06-17
Payer: COMMERCIAL

## 2025-06-17 ENCOUNTER — TELEPHONE (OUTPATIENT)
Dept: HEMATOLOGY/ONCOLOGY | Facility: CLINIC | Age: 55
End: 2025-06-17
Payer: COMMERCIAL

## 2025-06-17 NOTE — TELEPHONE ENCOUNTER
SW consulted re: help with $65 copay. POLINA attempted to reach pt. POLINA will initiate cost transfer for help from the [facility] and f/u.       4:50 pm-POLINA emailed cost transfer form to E. Cover with [facility]. SW will remain available.

## 2025-06-18 ENCOUNTER — OFFICE VISIT (OUTPATIENT)
Dept: HEMATOLOGY/ONCOLOGY | Facility: CLINIC | Age: 55
End: 2025-06-18
Payer: COMMERCIAL

## 2025-06-18 ENCOUNTER — LAB VISIT (OUTPATIENT)
Dept: LAB | Facility: HOSPITAL | Age: 55
End: 2025-06-18
Attending: STUDENT IN AN ORGANIZED HEALTH CARE EDUCATION/TRAINING PROGRAM
Payer: COMMERCIAL

## 2025-06-18 VITALS
WEIGHT: 203.06 LBS | HEIGHT: 69 IN | HEART RATE: 83 BPM | SYSTOLIC BLOOD PRESSURE: 101 MMHG | DIASTOLIC BLOOD PRESSURE: 69 MMHG | TEMPERATURE: 98 F | OXYGEN SATURATION: 96 % | BODY MASS INDEX: 30.08 KG/M2

## 2025-06-18 DIAGNOSIS — C18.7 MALIGNANT NEOPLASM OF SIGMOID COLON: ICD-10-CM

## 2025-06-18 DIAGNOSIS — D84.821 IMMUNODEFICIENCY DUE TO CHEMOTHERAPY: ICD-10-CM

## 2025-06-18 DIAGNOSIS — T45.1X5A IMMUNODEFICIENCY DUE TO CHEMOTHERAPY: ICD-10-CM

## 2025-06-18 DIAGNOSIS — C19 RECURRENT COLORECTAL CANCER: ICD-10-CM

## 2025-06-18 DIAGNOSIS — R53.83 CHEMOTHERAPY-INDUCED FATIGUE: Primary | ICD-10-CM

## 2025-06-18 DIAGNOSIS — T45.1X5A CHEMOTHERAPY INDUCED DIARRHEA: ICD-10-CM

## 2025-06-18 DIAGNOSIS — Z79.69 IMMUNODEFICIENCY DUE TO CHEMOTHERAPY: ICD-10-CM

## 2025-06-18 DIAGNOSIS — K52.1 CHEMOTHERAPY INDUCED DIARRHEA: ICD-10-CM

## 2025-06-18 DIAGNOSIS — T45.1X5A CHEMOTHERAPY-INDUCED FATIGUE: Primary | ICD-10-CM

## 2025-06-18 LAB
ABSOLUTE EOSINOPHIL (OHS): 0.48 K/UL
ABSOLUTE MONOCYTE (OHS): 0.4 K/UL (ref 0.3–1)
ABSOLUTE NEUTROPHIL COUNT (OHS): 1.83 K/UL (ref 1.8–7.7)
ALBUMIN SERPL BCP-MCNC: 3.4 G/DL (ref 3.5–5.2)
ALP SERPL-CCNC: 102 UNIT/L (ref 40–150)
ALT SERPL W/O P-5'-P-CCNC: 17 UNIT/L (ref 10–44)
ANION GAP (OHS): 7 MMOL/L (ref 8–16)
AST SERPL-CCNC: 21 UNIT/L (ref 11–45)
BASOPHILS # BLD AUTO: 0.02 K/UL
BASOPHILS NFR BLD AUTO: 0.6 %
BILIRUB SERPL-MCNC: 0.2 MG/DL (ref 0.1–1)
BUN SERPL-MCNC: 9 MG/DL (ref 6–20)
CALCIUM SERPL-MCNC: 9.5 MG/DL (ref 8.7–10.5)
CHLORIDE SERPL-SCNC: 106 MMOL/L (ref 95–110)
CO2 SERPL-SCNC: 26 MMOL/L (ref 23–29)
CREAT SERPL-MCNC: 0.9 MG/DL (ref 0.5–1.4)
ERYTHROCYTE [DISTWIDTH] IN BLOOD BY AUTOMATED COUNT: 17.2 % (ref 11.5–14.5)
FERRITIN SERPL-MCNC: 66 NG/ML (ref 20–300)
GFR SERPLBLD CREATININE-BSD FMLA CKD-EPI: >60 ML/MIN/1.73/M2
GLUCOSE SERPL-MCNC: 89 MG/DL (ref 70–110)
HCT VFR BLD AUTO: 36.1 % (ref 37–48.5)
HGB BLD-MCNC: 10.9 GM/DL (ref 12–16)
IMM GRANULOCYTES # BLD AUTO: 0.01 K/UL (ref 0–0.04)
IMM GRANULOCYTES NFR BLD AUTO: 0.3 % (ref 0–0.5)
IRON SATN MFR SERPL: 24 % (ref 20–50)
IRON SERPL-MCNC: 81 UG/DL (ref 30–160)
LYMPHOCYTES # BLD AUTO: 0.41 K/UL (ref 1–4.8)
MAGNESIUM SERPL-MCNC: 1.8 MG/DL (ref 1.6–2.6)
MCH RBC QN AUTO: 25.1 PG (ref 27–31)
MCHC RBC AUTO-ENTMCNC: 30.2 G/DL (ref 32–36)
MCV RBC AUTO: 83 FL (ref 82–98)
NUCLEATED RBC (/100WBC) (OHS): 0 /100 WBC
PHOSPHATE SERPL-MCNC: 3.6 MG/DL (ref 2.7–4.5)
PLATELET # BLD AUTO: 275 K/UL (ref 150–450)
PMV BLD AUTO: 8.9 FL (ref 9.2–12.9)
POTASSIUM SERPL-SCNC: 4.9 MMOL/L (ref 3.5–5.1)
PROT SERPL-MCNC: 7.3 GM/DL (ref 6–8.4)
RBC # BLD AUTO: 4.35 M/UL (ref 4–5.4)
RELATIVE EOSINOPHIL (OHS): 15.2 %
RELATIVE LYMPHOCYTE (OHS): 13 % (ref 18–48)
RELATIVE MONOCYTE (OHS): 12.7 % (ref 4–15)
RELATIVE NEUTROPHIL (OHS): 58.2 % (ref 38–73)
SODIUM SERPL-SCNC: 139 MMOL/L (ref 136–145)
TIBC SERPL-MCNC: 337 UG/DL (ref 250–450)
TRANSFERRIN SERPL-MCNC: 228 MG/DL (ref 200–375)
WBC # BLD AUTO: 3.15 K/UL (ref 3.9–12.7)

## 2025-06-18 PROCEDURE — 82728 ASSAY OF FERRITIN: CPT

## 2025-06-18 PROCEDURE — 85025 COMPLETE CBC W/AUTO DIFF WBC: CPT

## 2025-06-18 PROCEDURE — 84100 ASSAY OF PHOSPHORUS: CPT

## 2025-06-18 PROCEDURE — 80053 COMPREHEN METABOLIC PANEL: CPT

## 2025-06-18 PROCEDURE — 99999 PR PBB SHADOW E&M-EST. PATIENT-LVL IV: CPT | Mod: PBBFAC,,, | Performed by: INTERNAL MEDICINE

## 2025-06-18 PROCEDURE — 36415 COLL VENOUS BLD VENIPUNCTURE: CPT

## 2025-06-18 PROCEDURE — 83735 ASSAY OF MAGNESIUM: CPT

## 2025-06-18 PROCEDURE — 84466 ASSAY OF TRANSFERRIN: CPT

## 2025-06-18 NOTE — PROGRESS NOTES
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 Jakub Powers   Adjuvant Chemotherapy with?  CAPOX x7 cycles (February 2024 - August 2024)  Current Treatment: Chemorads (Xeloda)    Subjective   The patient presents for follow up.     Since decreasing her dose of Xeloda her diarrhea is much improved.  She is having fatigue but overall doing well.  We reviewed the plan as outlined below and she is in agreement.      Otherwise, she has no acute complaints.      Review of Systems   Constitutional:  Positive for fatigue. Negative for activity change, appetite change, chills, diaphoresis, fever and unexpected weight change.   Respiratory:  Negative for shortness of breath.    Cardiovascular:  Negative for chest pain.   Gastrointestinal:  Negative for abdominal distention, abdominal pain, anal bleeding, blood in stool, constipation, diarrhea, nausea and vomiting.   Musculoskeletal:  Negative for arthralgias, back pain and myalgias.   Skin:  Negative for rash.   Neurological:  Negative for dizziness, weakness, light-headedness and headaches.   Hematological:  Does not bruise/bleed easily.   Psychiatric/Behavioral:  The patient is not nervous/anxious.      History   HPI  Kaylan Lal is a 55 y.o. female with history of stage IIIC adenocarcinoma of the sigmoid colon with treatment history as documented above, GERD, DM II, non-insulin dependent, HLD and H. Pylori - ?untreated who presents to clinic to establish care after noted to have pelvic recurrence.      She initially presented with persistent  vaginal discharge and was evaluated by her OBGYN physician who then referred her to Gastroenterology for colonoscopy.  Colonoscopy was done 10/19/2023 she was noted to have a mass above the rectosigmoid area.  Biopsy pathology resulted as well-differentiated adenocarcinoma.  MRI of the pelvis 11/11/2023 showed an 11.3 x 6.4 x 7.2 cm mass with mucinous appearing features that invaded the vaginal cuff and adjacent vaginal apex.  She subsequently underwent robotic-assisted posterior exenteration/LAR, vaginectomy and cystorrhaphy 12/18/2023 by Dr. Jakub Angulo and Dr. Powers.  Final path resulted as invasive mucinous adenocarcinoma, moderately differentiated, involving the vaginal mucosa (pT4b pN1a).        [place] was [place] with Dr. Teague and reports receiving 1 cycle of chemotherapy there but unsure what she received; she then transferred care to Dr. Dorantes.  At that time the plan was for treatment with 7 cycles of CAPEOX followed by Signatera testing.  On review of her record it states that she is receiving Kisqali/fulvestrant/Xgeva in the patient is unsure of what she received.  She does remember taking a chemotherapy pill and infusion.  I informed her that this is likely a typographical error however we will obtain the floor record to try to confirmed.    We reviewed in detail her imaging as well as the recommended treatment plan in tumor board consensus.  We discussed the nature of recurrent colon cancer and stage IV disease and potential treatment options.  I do recommend she obtain CT chest to assess for any distant metastatic spread she is in agreement.  Barring the results of this test we will proceed with chemoradiation I discussed with her the chemotherapeutic options of 5 FU versus capecitabine.  She states that she is clumsy and would not do well with the pump and opts to proceed with the p.o. chemotherapy.  We briefly reviewed the potential side effects.  Otherwise today  she has no acute complaints.  She does have some social issues which I will refer her to our financial counselor as well as  to further discuss.      Her family history is significant for a maternal aunt with ovarian cancer and her sister with breast cancer.  Socially, she is a never smoker, does not drink alcohol and denies illicit drug use.  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  She does have adult children.      Oncology History   Colon cancer   10/19/2023 Cancer Staged    Staging form: Colon and Rectum, AJCC 8th Edition  - Clinical stage from 10/19/2023: Stage IIIC (cT4b, cN1a, cM0)     4/30/2025 Initial Diagnosis    Colon cancer     5/15/2025 -  Chemotherapy    Treatment Summary   Plan Name: OP CAPECITABINE 5 DAYS + RADIOTHERAPY  Treatment Goal: Curative  Status: Active  Start Date: 5/15/2025  End Date: 5/15/2025  Provider: Tanisha Diaz MD  Chemotherapy: capecitabine (XELODA) tablet 1,750 mg, 825 mg/m2 = 1,750 mg, Oral, 2 times daily, 1 of 1 cycle, Start date: 5/15/2025, End date: --           Past Medical History:   Diagnosis Date    Anxiety 2021    Colon cancer 10/2023    Depression 2021    Fibroid 2007       Past Surgical History:   Procedure Laterality Date    COLONOSCOPY N/A 10/19/2023    Procedure: COLONOSCOPY;  Surgeon: Gladys Teixeira MD;  Location: [facility];  Service: Endoscopy;  Laterality: N/A;    ESOPHAGOGASTRODUODENOSCOPY N/A 10/19/2023    Procedure: EGD (ESOPHAGOGASTRODUODENOSCOPY);  Surgeon: Gladys Teixeira MD;  Location: [facility];  Service: Endoscopy;  Laterality: N/A;    HYSTERECTOMY  2007    [facility]; Dr. Suero; re: fibroids/menorrhagia       Family History   Problem Relation Name Age of Onset    Heart attack Mother Celi Lal     Hypertension Mother Celi Lal     Diabetes Father Leo Aguilar     Breast cancer Sister  55    Ovarian cancer Maternal Aunt Linda Lees 62        or cervical?       Review of patient's allergies  indicates:   Allergen Reactions    Codeine Rash     Other reaction(s): Unknown       Social History[1]       Physical Exam     ECOG SCORE    0 - Fully active-able to carry on all pre-disease performance without restriction       Vitals:    06/18/25 1030   BP: 101/69   Pulse: 83   Temp: 97.6 °F (36.4 °C)     Physical Exam  Constitutional:       General: She is not in acute distress.     Appearance: Normal appearance. She is normal weight. She is not ill-appearing or toxic-appearing.   HENT:      Head: Normocephalic and atraumatic.   Eyes:      Conjunctiva/sclera: Conjunctivae normal.   Cardiovascular:      Rate and Rhythm: Normal rate.   Pulmonary:      Effort: Pulmonary effort is normal.   Neurological:      Mental Status: She is alert and oriented to person, place, and time. Mental status is at baseline.   Psychiatric:         Mood and Affect: Mood normal.         Behavior: Behavior normal.         Thought Content: Thought content normal.       Labs   Labs:  Lab Visit on 06/18/2025   Component Date Value Ref Range Status    Magnesium  06/18/2025 1.8  1.6 - 2.6 mg/dL Final    Sodium 06/18/2025 139  136 - 145 mmol/L Final    Potassium 06/18/2025 4.9  3.5 - 5.1 mmol/L Final    Chloride 06/18/2025 106  95 - 110 mmol/L Final    CO2 06/18/2025 26  23 - 29 mmol/L Final    Glucose 06/18/2025 89  70 - 110 mg/dL Final    BUN 06/18/2025 9  6 - 20 mg/dL Final    Creatinine 06/18/2025 0.9  0.5 - 1.4 mg/dL Final    Calcium 06/18/2025 9.5  8.7 - 10.5 mg/dL Final    Protein Total 06/18/2025 7.3  6.0 - 8.4 gm/dL Final    Albumin 06/18/2025 3.4 (L)  3.5 - 5.2 g/dL Final    Bilirubin Total 06/18/2025 0.2  0.1 - 1.0 mg/dL Final    For infants and newborns, interpretation of results should be based   on gestational age, weight and in agreement with clinical   observations.    Premature Infant recommended reference ranges:   0-24 hours:  <8.0 mg/dL   24-48 hours: <12.0 mg/dL   3-5 days:    <15.0 mg/dL   6-29 days:   <15.0 mg/dL    ALP  06/18/2025 102  40 - 150 unit/L Final    AST 06/18/2025 21  11 - 45 unit/L Final    ALT 06/18/2025 17  10 - 44 unit/L Final    Anion Gap 06/18/2025 7 (L)  8 - 16 mmol/L Final    eGFR 06/18/2025 >60  >60 mL/min/1.73/m2 Final    Estimated GFR calculated using the CKD-EPI creatinine (2021) equation.    WBC 06/18/2025 3.15 (L)  3.90 - 12.70 K/uL Final    RBC 06/18/2025 4.35  4.00 - 5.40 M/uL Final    HGB 06/18/2025 10.9 (L)  12.0 - 16.0 gm/dL Final    HCT 06/18/2025 36.1 (L)  37.0 - 48.5 % Final    MCV 06/18/2025 83  82 - 98 fL Final    MCH 06/18/2025 25.1 (L)  27.0 - 31.0 pg Final    MCHC 06/18/2025 30.2 (L)  32.0 - 36.0 g/dL Final    RDW 06/18/2025 17.2 (H)  11.5 - 14.5 % Final    Platelet Count 06/18/2025 275  150 - 450 K/uL Final    MPV 06/18/2025 8.9 (L)  9.2 - 12.9 fL Final    Nucleated RBC 06/18/2025 0  <=0 /100 WBC Final    Neut % 06/18/2025 58.2  38 - 73 % Final    Lymph % 06/18/2025 13.0 (L)  18 - 48 % Final    Mono % 06/18/2025 12.7  4 - 15 % Final    Eos % 06/18/2025 15.2 (H)  <=8 % Final    Basophil % 06/18/2025 0.6  <=1.9 % Final    Imm Grans % 06/18/2025 0.3  0.0 - 0.5 % Final    Neut # 06/18/2025 1.83  1.8 - 7.7 K/uL Final    Lymph # 06/18/2025 0.41 (L)  1 - 4.8 K/uL Final    Mono # 06/18/2025 0.40  0.3 - 1 K/uL Final    Eos # 06/18/2025 0.48  <=0.5 K/uL Final    Baso # 06/18/2025 0.02  <=0.2 K/uL Final    Imm Grans # 06/18/2025 0.01  0.00 - 0.04 K/uL Final    Mild elevation in immature granulocytes is non specific and can be seen in a variety of conditions including stress response, acute inflammation, trauma and pregnancy. Correlation with other laboratory and clinical findings is essential.        Pathology   Pathology reviewed, detailed results as follows:   FINAL PATH:  Pelvic Tumor:  POSITIVE for mucinous adenocarcinoma, morphologically consistent with patient's colorectal primary  Rectum & Distla sigmoid colon (26 cm in length) robotic LAR with vaginectomy:  - Moderately-differentiated invasive  mucinous adenocarcinoma of sigmoid colon, involving the vaginal mucosa  - Metastatic carcinoma in one of fourty-four lymph nodes (1/44)   PATHOLOGICAL TUMOR STAGE: pT4b pN1a.        Specimen to Pathology Gastrointestinal tract: [id number] - 04/03/2025       Component  Ref Range & Units (hover)  Resulting Agency   Case Report   [facility]                              Case: [id number]                                 Authorizing Provider:  Neal Seth MD  Collected:           04/03/2025 11:17 AM           Ordering Location:     [facility]  Received:            04/04/2025 12:29 PM                                  [place]                                                                           Pathologist:           Atilio Vila MD                                                         Specimens:   1) - Stomach, Antrum, gastric bx; r/o h pylori                                                      2) - Large intestine, Colon, Rectum, rectal mass bx                                        Clinical Information  [facility]      Diagnosis:  K52.9 - Chronic diarrhea [ICD-10-CM]  Z85.038 - History of colon cancer [ICD-10-CM]  Z86.19 - History of Helicobacter pylori infection [ICD-10-CM]      Final Diagnosis   1. Stomach, Antrum, Biopsy:     - Gastric oxyntic-type mucosa with abundant Helicobacter-like organisms and moderate chronic, active inflammation.  See comment.  - Negative for intestinal metaplasia.  - Negative for dysplasia and malignancy.     Comment:  An immunohistochemical stain (IHC) for Helicobacter pylori is performed with appropriate controls reviewed and is POSITIVE.     2. Rectum, Mass, Biopsy:     - Invasive adenocarcinoma, well differentiated. See Comment.     Comment:  The patient's history of colonic primary invasive adenocarcinoma (BRS-) status post resection and chemotherapy is noted.  The present case demonstrates focal invasive carcinoma arising in a background  "of high-grade dysplasia and necrosis, consistent with recurrence/persistence of the patient's known colonic primary adenocarcinoma. Immunohistochemical stains (IHC) for CK20 and CDX-2 are performed with appropriate controls reviewed and are POSITIVE. H&E levels are examined.   Electronically signed by Atilio Vila MD on 4/9/2025 at 2:38 PM    Gross Description  [facility]   1. Stomach, Antrum: gastric bx; r/o h pylori  MRN # on Epic Label:  [id number]  MRN # on Pathology Label:  [id number]    The specimen is received in formalin labeled "gastric bx; R/O H pylori".  The specimen consists of 1 tan fragment of soft tissue measuring 0.5 x 0.2 x 0.2 cm.  The specimen is submitted entirely in cassette 1A.     Grossing was completed by Luan Ayala on 4/6/2025.     2. Large intestine, Colon, Rectum: rectal mass bx  MRN # on Epic Label: [id number]  MRN # on Pathology Label: [id number]    The specimen is received in formalin labeled "rectal mass bx".  The specimen consists of multiple tan fragments of soft tissue measuring 0.9 x 0.2 x 0.1 cm in aggregate.  The specimen is submitted entirely in cassette 2A.     Grossing was completed by Luan Ayala on 4/6/2025.      Report Footnotes  [facility]   Unless the case is a "gross only" or additional testing only, the final diagnosis for each specimen is based on a microscopic examination of appropriate tissue sections.   Performing Location  [facility]   Grossing performed at [facility], [address] [place], [address]     Sign out performed at [facility], [id number] [place] [address] [place], [address]   Dx Category Malignant/neoplasm Abnormal  [facility]             Imaging   MRI Rectal Cancer W W/O Contrast - 04/16/2025  FINDINGS:  There is a large mucin containing T2 hyperintense mass centered in the posterior pelvis with epicenter outside of bowel and demonstrates some mass effect upon collapsed rectosigmoid.  It demonstrates diffuse heterogeneous enhancement.  It " is significantly reduced in size measuring roughly 4.8 x 4.2 x 4.2 cm compared to approximately 7.0 x 10.5 x 11.3 cm on the prior examination (remeasured by me today).  There is a small rounded right-sided mesorectal lymph node which appears increased in size which measures approximately 6 mm (series 2, image 29).  No additional enlarged lymph nodes.  Hysterectomy.  Ovaries appear within normal limits.  No ascites.  Bladder mostly collapsed.  No suspicious osseous lesions.     Impression:  There is a large T2 hyperintense mucinous mass with epicenter extrinsic to the bowel as seen on the comparison examination.  It is reduced in size.  There is a small mildly suspicious right-sided mesorectal subcentimeter lymph node.        CT Chest Abdomen Pelvis With IV Contrast (XPD) Routine Oral Contrast - 05/08/2025  Impression:  Compared to 09/09/2024, suggestion of interval progression of disease with enlargement of the perirectal mass now 4.8 x 3.7 cm, previously 3.6 x 3.3 cm as remeasured on prior exam from 09/09/2024. Additionally, there is suggestion of enlargement of an intraluminal component of the mass, difficult to quantify due to the presence of stool. Otherwise, the rectal anastomosis appears intact without evidence of obstruction. There are multiple nonspecific subcentimeter short axis mesorectal and pelvic lymph nodes, somewhat suspicious.  No evidence of distant spread of metastatic disease in the upper abdomen or chest.    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 Jakub Angulo and Dr. Powers.), with final pathology resultant as  [id number], mucinous adenocarcinoma. Pre treatment  CEA 10/04/2023 07.28.  Colorectal TB 04/23/2025:  This represents local persistence/recurrence of disease. Consensus decision to proceed with radiation followed by restaging. She will also benefit from chemotherapy - details of her prior regimen is being ascertained.   CEA 04/17/2025: 18.7  Plan  Genetic testing pending  - referred to genetic counselor  CT CAP 05/08/25 showed no distant metastatic disease  Patient has started chemoradiation with capecitabine - discussed with patient potential SEs and instructions on administration  Continue with dose reduced Xeloda at 1300 mg BID  Labs reviewed and stable      Chemotherapy Induced Fatigue  Currently manageable  Continue to monitor      Chemotherapy Induced Diarrhea  Diarrhea approximately q2h  On Imodium without significant relief  Lomotil prescribed   Diarrhea improved with dose reduced Xeloda      Cancer Screening  MMG 10/09/2024: BI-RADS Category 1: Negative   PAP Smear: Hx of Hysterectomy       Chronic Medical Conditions  GERD  DM II, non-insulin dependent   HLD  H. Pylori - ?untreated        Med Onc Chart Routing      Follow up with physician    Follow up with GUSTAVO 2 weeks.   Infusion scheduling note    Injection scheduling note    Labs CBC, CMP, magnesium and phosphorus   Scheduling:  Preferred lab:  Lab interval:     Imaging    Pharmacy appointment    Other referrals                The patient was seen, interviewed and examined. Pertinent lab and radiologic studies were reviewed. Pt instructed to call should they develop concerning signs/symptoms or have further questions.        Portions of the record may have been created with voice recognition software. Occasional wrong-word or sound-a-like substitutions may have occurred due to the inherent limitations of voice recognition software. Read the chart carefully and recognize, using context, where substitutions have occurred.      Tanisha Vines MD    Hematology/Oncology                [1]   Social  History  Tobacco Use    Smoking status: Never    Smokeless tobacco: Never   Substance Use Topics    Alcohol use: Never    Drug use: Never

## 2025-06-20 ENCOUNTER — DOCUMENTATION ONLY (OUTPATIENT)
Dept: RADIATION ONCOLOGY | Facility: CLINIC | Age: 55
End: 2025-06-20
Payer: COMMERCIAL

## 2025-06-20 NOTE — PLAN OF CARE
Day 23 of xrt to the pelvis. Patient tolerating treatment well and has no complaints at this time.

## 2025-06-24 ENCOUNTER — DOCUMENTATION ONLY (OUTPATIENT)
Dept: RADIATION ONCOLOGY | Facility: CLINIC | Age: 55
End: 2025-06-24
Payer: COMMERCIAL

## 2025-06-24 ENCOUNTER — TELEPHONE (OUTPATIENT)
Dept: HEMATOLOGY/ONCOLOGY | Facility: CLINIC | Age: 55
End: 2025-06-24
Payer: COMMERCIAL

## 2025-06-25 ENCOUNTER — TELEPHONE (OUTPATIENT)
Dept: PHARMACY | Facility: CLINIC | Age: 55
End: 2025-06-25
Payer: COMMERCIAL

## 2025-06-25 DIAGNOSIS — T45.1X5A CHEMOTHERAPY-INDUCED FATIGUE: Primary | ICD-10-CM

## 2025-06-25 DIAGNOSIS — R53.83 CHEMOTHERAPY-INDUCED FATIGUE: Primary | ICD-10-CM

## 2025-06-25 DIAGNOSIS — T45.1X5A IMMUNODEFICIENCY DUE TO CHEMOTHERAPY: ICD-10-CM

## 2025-06-25 DIAGNOSIS — D84.821 IMMUNODEFICIENCY DUE TO CHEMOTHERAPY: ICD-10-CM

## 2025-06-25 DIAGNOSIS — Z79.69 IMMUNODEFICIENCY DUE TO CHEMOTHERAPY: ICD-10-CM

## 2025-06-25 NOTE — TELEPHONE ENCOUNTER
[facility] Chart Review & Patient Outreach Details For Medication Adherence Project    Reason for Outreach Encounter: 3rd Party payor non-compliance report ([employer], [employer], C, etc)  2.  Patient Outreach Method: Reviewed patient chart  and [employer] message  3.   Medication in question:    Diabetes Medications              SITagliptin phosphate (JANUVIA) 100 MG Tab Take 1 tablet (100 mg total) by mouth once daily.                   last filled  4/3/25 for 30 day supply      4.  Reviewed and or Updates Made To: Patient Chart  5. Outreach Outcomes and/or actions taken: Sent inquiry to patient: Waiting for response and Patient reminded to  prescription  Additional Notes:

## 2025-07-01 ENCOUNTER — HOSPITAL ENCOUNTER (OUTPATIENT)
Dept: RADIATION THERAPY | Facility: HOSPITAL | Age: 55
Discharge: HOME | End: 2025-07-01
Attending: SPECIALIST
Payer: COMMERCIAL

## 2025-07-03 ENCOUNTER — LAB VISIT (OUTPATIENT)
Dept: LAB | Facility: HOSPITAL | Age: 55
End: 2025-07-03
Attending: INTERNAL MEDICINE
Payer: COMMERCIAL

## 2025-07-03 ENCOUNTER — OFFICE VISIT (OUTPATIENT)
Dept: HEMATOLOGY/ONCOLOGY | Facility: CLINIC | Age: 55
End: 2025-07-03
Payer: COMMERCIAL

## 2025-07-03 VITALS
HEART RATE: 89 BPM | SYSTOLIC BLOOD PRESSURE: 92 MMHG | DIASTOLIC BLOOD PRESSURE: 62 MMHG | HEIGHT: 69 IN | OXYGEN SATURATION: 97 % | WEIGHT: 201.94 LBS | BODY MASS INDEX: 29.91 KG/M2 | TEMPERATURE: 98 F

## 2025-07-03 DIAGNOSIS — T45.1X5A IMMUNODEFICIENCY DUE TO CHEMOTHERAPY: ICD-10-CM

## 2025-07-03 DIAGNOSIS — Z79.69 IMMUNODEFICIENCY DUE TO CHEMOTHERAPY: ICD-10-CM

## 2025-07-03 DIAGNOSIS — R30.0 DYSURIA: ICD-10-CM

## 2025-07-03 DIAGNOSIS — C18.7 MALIGNANT NEOPLASM OF SIGMOID COLON: Primary | ICD-10-CM

## 2025-07-03 DIAGNOSIS — D84.821 IMMUNODEFICIENCY DUE TO CHEMOTHERAPY: ICD-10-CM

## 2025-07-03 LAB
ABSOLUTE EOSINOPHIL (OHS): 0.99 K/UL
ABSOLUTE MONOCYTE (OHS): 0.45 K/UL (ref 0.3–1)
ABSOLUTE NEUTROPHIL COUNT (OHS): 1.93 K/UL (ref 1.8–7.7)
ALBUMIN SERPL BCP-MCNC: 3.4 G/DL (ref 3.5–5.2)
ALP SERPL-CCNC: 93 UNIT/L (ref 40–150)
ALT SERPL W/O P-5'-P-CCNC: 11 UNIT/L (ref 10–44)
ANION GAP (OHS): 9 MMOL/L (ref 8–16)
AST SERPL-CCNC: 16 UNIT/L (ref 11–45)
BASOPHILS # BLD AUTO: 0.02 K/UL
BASOPHILS NFR BLD AUTO: 0.5 %
BILIRUB SERPL-MCNC: 0.2 MG/DL (ref 0.1–1)
BILIRUB UR QL STRIP.AUTO: NEGATIVE
BUN SERPL-MCNC: 12 MG/DL (ref 6–20)
CALCIUM SERPL-MCNC: 9.5 MG/DL (ref 8.7–10.5)
CHLORIDE SERPL-SCNC: 109 MMOL/L (ref 95–110)
CLARITY UR: ABNORMAL
CO2 SERPL-SCNC: 24 MMOL/L (ref 23–29)
COLOR UR AUTO: ABNORMAL
CREAT SERPL-MCNC: 1 MG/DL (ref 0.5–1.4)
ERYTHROCYTE [DISTWIDTH] IN BLOOD BY AUTOMATED COUNT: 17.8 % (ref 11.5–14.5)
GFR SERPLBLD CREATININE-BSD FMLA CKD-EPI: >60 ML/MIN/1.73/M2
GLUCOSE SERPL-MCNC: 135 MG/DL (ref 70–110)
GLUCOSE UR QL STRIP: NEGATIVE
HCT VFR BLD AUTO: 36.8 % (ref 37–48.5)
HGB BLD-MCNC: 10.9 GM/DL (ref 12–16)
HGB UR QL STRIP: NEGATIVE
IMM GRANULOCYTES # BLD AUTO: 0 K/UL (ref 0–0.04)
IMM GRANULOCYTES NFR BLD AUTO: 0 % (ref 0–0.5)
KETONES UR QL STRIP: NEGATIVE
LEUKOCYTE ESTERASE UR QL STRIP: ABNORMAL
LYMPHOCYTES # BLD AUTO: 0.39 K/UL (ref 1–4.8)
MAGNESIUM SERPL-MCNC: 2 MG/DL (ref 1.6–2.6)
MCH RBC QN AUTO: 25.1 PG (ref 27–31)
MCHC RBC AUTO-ENTMCNC: 29.6 G/DL (ref 32–36)
MCV RBC AUTO: 85 FL (ref 82–98)
NITRITE UR QL STRIP: NEGATIVE
NUCLEATED RBC (/100WBC) (OHS): 0 /100 WBC
PH UR STRIP: 6 [PH]
PHOSPHATE SERPL-MCNC: 4.5 MG/DL (ref 2.7–4.5)
PLATELET # BLD AUTO: 260 K/UL (ref 150–450)
PMV BLD AUTO: 9.1 FL (ref 9.2–12.9)
POTASSIUM SERPL-SCNC: 4.4 MMOL/L (ref 3.5–5.1)
PROT SERPL-MCNC: 7.2 GM/DL (ref 6–8.4)
PROT UR QL STRIP: ABNORMAL
RBC # BLD AUTO: 4.34 M/UL (ref 4–5.4)
RELATIVE EOSINOPHIL (OHS): 26.2 %
RELATIVE LYMPHOCYTE (OHS): 10.3 % (ref 18–48)
RELATIVE MONOCYTE (OHS): 11.9 % (ref 4–15)
RELATIVE NEUTROPHIL (OHS): 51.1 % (ref 38–73)
SODIUM SERPL-SCNC: 142 MMOL/L (ref 136–145)
SP GR UR STRIP: >=1.03
UROBILINOGEN UR STRIP-ACNC: NEGATIVE EU/DL
WBC # BLD AUTO: 3.78 K/UL (ref 3.9–12.7)

## 2025-07-03 PROCEDURE — 85025 COMPLETE CBC W/AUTO DIFF WBC: CPT

## 2025-07-03 PROCEDURE — 99999 PR PBB SHADOW E&M-EST. PATIENT-LVL IV: CPT | Mod: PBBFAC,,, | Performed by: INTERNAL MEDICINE

## 2025-07-03 PROCEDURE — 83735 ASSAY OF MAGNESIUM: CPT

## 2025-07-03 PROCEDURE — 87086 URINE CULTURE/COLONY COUNT: CPT

## 2025-07-03 PROCEDURE — 80053 COMPREHEN METABOLIC PANEL: CPT

## 2025-07-03 PROCEDURE — 36415 COLL VENOUS BLD VENIPUNCTURE: CPT

## 2025-07-03 PROCEDURE — 84100 ASSAY OF PHOSPHORUS: CPT

## 2025-07-03 PROCEDURE — 81003 URINALYSIS AUTO W/O SCOPE: CPT

## 2025-07-03 NOTE — PROGRESS NOTES
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 Jakub Powers   Adjuvant Chemotherapy with?  CAPOX x7 cycles (February 2024 - August 2024)  s/p Chemo/Rads 07/01/2025 (Xeloda; dose reduced mid way due to diarrhea)    Current Treatment: Active Surveillance    Subjective   The patient presents for follow up.     She completed chemoradiation this week and reports that overall she feels well.  She has been having dysuria the last couple of days but is unsure if it is effects of radiation or she is developing a UTI.  I recommend we obtain a UA.    She is planning to return to work next week.  We reviewed her overall plan and she is in agreement.    Review of Systems   Constitutional:  Negative for activity change, diaphoresis, fatigue, fever and unexpected weight change.   Respiratory:  Negative for shortness of breath.    Cardiovascular:  Negative for chest pain.   Gastrointestinal:  Negative for abdominal pain, anal bleeding, diarrhea, nausea and vomiting.   Genitourinary:  Positive for dysuria.   Hematological:  Does not bruise/bleed easily.   Psychiatric/Behavioral:  The patient is not nervous/anxious.      History   HPI  Kaylan Lal is a 55 y.o. female with history of stage IIIC adenocarcinoma of the sigmoid colon with treatment history as documented above, GERD, DM II, non-insulin dependent, HLD and H. Pylori - ?untreated who presents to clinic to establish care after noted to have pelvic recurrence.      She initially presented with persistent vaginal discharge and was evaluated by her  OBGYN physician who then referred her to Gastroenterology for colonoscopy.  Colonoscopy was done 10/19/2023 she was noted to have a mass above the rectosigmoid area.  Biopsy pathology resulted as well-differentiated adenocarcinoma.  MRI of the pelvis 11/11/2023 showed an 11.3 x 6.4 x 7.2 cm mass with mucinous appearing features that invaded the vaginal cuff and adjacent vaginal apex.  She subsequently underwent robotic-assisted posterior exenteration/LAR, vaginectomy and cystorrhaphy 12/18/2023 by Dr. Jakub Angulo and Dr. Powers.  Final path resulted as invasive mucinous adenocarcinoma, moderately differentiated, involving the vaginal mucosa (pT4b pN1a).        She initially was seen at [facility] with Dr. Teague and reports receiving 1 cycle of chemotherapy there but unsure what she received; she then transferred care to Dr. Dorantes.  At that time the plan was for treatment with 7 cycles of CAPEOX followed by Signatera testing.  On review of her record it states that she is receiving Kisqali/fulvestrant/Xgeva in the patient is unsure of what she received.  She does remember taking a chemotherapy pill and infusion.  I informed her that this is likely a typographical error however we will obtain the floor record to try to confirmed.    We reviewed in detail her imaging as well as the recommended treatment plan in tumor board consensus.  We discussed the nature of recurrent colon cancer and stage IV disease and potential treatment options.  I do recommend she obtain CT chest to assess for any distant metastatic spread she is in agreement.  Barring the results of this test we will proceed with chemoradiation I discussed with her the chemotherapeutic options of 5 FU versus capecitabine.  She states that she is clumsy and would not do well with the pump and opts to proceed with the p.o. chemotherapy.  We briefly reviewed the potential side effects.  Otherwise today she has no acute complaints.  She does have  some social issues which I will refer her to our financial counselor as well as  to further discuss.      Her family history is significant for a maternal aunt with ovarian cancer and her sister with breast cancer.  Socially, she is a never smoker, does not drink alcohol and denies illicit drug use.  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  She does have adult children.      Oncology History   Colon cancer   10/19/2023 Cancer Staged    Staging form: Colon and Rectum, AJCC 8th Edition  - Clinical stage from 10/19/2023: Stage IIIC (cT4b, cN1a, cM0)     4/30/2025 Initial Diagnosis    Colon cancer     5/15/2025 -  Chemotherapy    Treatment Summary   Plan Name: OP CAPECITABINE 5 DAYS + RADIOTHERAPY  Treatment Goal: Curative  Status: Active  Start Date: 5/15/2025  End Date: 5/15/2025  Provider: Tanisha Diaz MD  Chemotherapy: capecitabine (XELODA) tablet 1,750 mg, 825 mg/m2 = 1,750 mg, Oral, 2 times daily, 1 of 1 cycle, Start date: 5/15/2025, End date: --           Past Medical History:   Diagnosis Date    Anxiety 2021    Colon cancer 10/2023    Depression 2021    Fibroid 2007       Past Surgical History:   Procedure Laterality Date    COLONOSCOPY N/A 10/19/2023    Procedure: COLONOSCOPY;  Surgeon: Gladys Teixeira MD;  Location: [facility];  Service: Endoscopy;  Laterality: N/A;    ESOPHAGOGASTRODUODENOSCOPY N/A 10/19/2023    Procedure: EGD (ESOPHAGOGASTRODUODENOSCOPY);  Surgeon: Gladys Teixeira MD;  Location: [facility];  Service: Endoscopy;  Laterality: N/A;    HYSTERECTOMY  2007    [facility]; Dr. Suero; re: fibroids/menorrhagia       Family History   Problem Relation Name Age of Onset    Heart attack Mother Celi Lal     Hypertension Mother Celi Lal     Diabetes Father Leo Aguilar     Breast cancer Sister  55    Ovarian cancer Maternal Aunt Linda Lees 62        or cervical?       Review of patient's allergies indicates:   Allergen Reactions    Codeine  Rash     Other reaction(s): Unknown       Social History[1]       Physical Exam     ECOG SCORE    0 - Fully active-able to carry on all pre-disease performance without restriction       Vitals:    07/03/25 1338   BP: 92/62   Pulse: 89   Temp: 97.6 °F (36.4 °C)     Physical Exam  Constitutional:       General: She is not in acute distress.     Appearance: Normal appearance. She is normal weight. She is not ill-appearing or toxic-appearing.   HENT:      Head: Normocephalic and atraumatic.   Eyes:      Conjunctiva/sclera: Conjunctivae normal.   Cardiovascular:      Rate and Rhythm: Normal rate.   Pulmonary:      Effort: Pulmonary effort is normal.   Neurological:      Mental Status: She is alert and oriented to person, place, and time. Mental status is at baseline.   Psychiatric:         Mood and Affect: Mood normal.         Behavior: Behavior normal.         Thought Content: Thought content normal.       Labs   Labs:  Lab Visit on 07/03/2025   Component Date Value Ref Range Status    Sodium 07/03/2025 142  136 - 145 mmol/L Final    Potassium 07/03/2025 4.4  3.5 - 5.1 mmol/L Final    Chloride 07/03/2025 109  95 - 110 mmol/L Final    CO2 07/03/2025 24  23 - 29 mmol/L Final    Glucose 07/03/2025 135 (H)  70 - 110 mg/dL Final    BUN 07/03/2025 12  6 - 20 mg/dL Final    Creatinine 07/03/2025 1.0  0.5 - 1.4 mg/dL Final    Calcium 07/03/2025 9.5  8.7 - 10.5 mg/dL Final    Protein Total 07/03/2025 7.2  6.0 - 8.4 gm/dL Final    Albumin 07/03/2025 3.4 (L)  3.5 - 5.2 g/dL Final    Bilirubin Total 07/03/2025 0.2  0.1 - 1.0 mg/dL Final    For infants and newborns, interpretation of results should be based   on gestational age, weight and in agreement with clinical   observations.    Premature Infant recommended reference ranges:   0-24 hours:  <8.0 mg/dL   24-48 hours: <12.0 mg/dL   3-5 days:    <15.0 mg/dL   6-29 days:   <15.0 mg/dL    ALP 07/03/2025 93  40 - 150 unit/L Final    AST 07/03/2025 16  11 - 45 unit/L Final    ALT  07/03/2025 11  10 - 44 unit/L Final    Anion Gap 07/03/2025 9  8 - 16 mmol/L Final    eGFR 07/03/2025 >60  >60 mL/min/1.73/m2 Final    Estimated GFR calculated using the CKD-EPI creatinine (2021) equation.    Magnesium  07/03/2025 2.0  1.6 - 2.6 mg/dL Final    Phosphorus Level 07/03/2025 4.5  2.7 - 4.5 mg/dL Final    WBC 07/03/2025 3.78 (L)  3.90 - 12.70 K/uL Final    RBC 07/03/2025 4.34  4.00 - 5.40 M/uL Final    HGB 07/03/2025 10.9 (L)  12.0 - 16.0 gm/dL Final    HCT 07/03/2025 36.8 (L)  37.0 - 48.5 % Final    MCV 07/03/2025 85  82 - 98 fL Final    MCH 07/03/2025 25.1 (L)  27.0 - 31.0 pg Final    MCHC 07/03/2025 29.6 (L)  32.0 - 36.0 g/dL Final    RDW 07/03/2025 17.8 (H)  11.5 - 14.5 % Final    Platelet Count 07/03/2025 260  150 - 450 K/uL Final    MPV 07/03/2025 9.1 (L)  9.2 - 12.9 fL Final    Nucleated RBC 07/03/2025 0  <=0 /100 WBC Final    Neut % 07/03/2025 51.1  38 - 73 % Final    Lymph % 07/03/2025 10.3 (L)  18 - 48 % Final    Mono % 07/03/2025 11.9  4 - 15 % Final    Eos % 07/03/2025 26.2 (H)  <=8 % Final    Basophil % 07/03/2025 0.5  <=1.9 % Final    Imm Grans % 07/03/2025 0.0  0.0 - 0.5 % Final    Neut # 07/03/2025 1.93  1.8 - 7.7 K/uL Final    Lymph # 07/03/2025 0.39 (L)  1 - 4.8 K/uL Final    Mono # 07/03/2025 0.45  0.3 - 1 K/uL Final    Eos # 07/03/2025 0.99 (H)  <=0.5 K/uL Final    Baso # 07/03/2025 0.02  <=0.2 K/uL Final    Imm Grans # 07/03/2025 0.00  0.00 - 0.04 K/uL Final        Pathology   Pathology reviewed, detailed results as follows:   FINAL PATH:  Pelvic Tumor:  POSITIVE for mucinous adenocarcinoma, morphologically consistent with patient's colorectal primary  Rectum & Distla sigmoid colon (26 cm in length) robotic LAR with vaginectomy:  - Moderately-differentiated invasive mucinous adenocarcinoma of sigmoid colon, involving the vaginal mucosa  - Metastatic carcinoma in one of fourty-four lymph nodes (1/44)   PATHOLOGICAL TUMOR STAGE: pT4b pN1a.        Specimen to Pathology Gastrointestinal  tract: [id number] - 04/03/2025       Component  Ref Range & Units (hover)  Resulting Agency   Case Report   [facility]                              Case: [id number]                                 Authorizing Provider:  Neal Seth MD  Collected:           04/03/2025 11:17 AM           Ordering Location:     [facility]  Received:            04/04/2025 12:29 PM                                  [place]                                                                           Pathologist:           Atilio Vila MD                                                         Specimens:   1) - Stomach, Antrum, gastric bx; r/o h pylori                                                      2) - Large intestine, Colon, Rectum, rectal mass bx                                        Clinical Information  [facility]      Diagnosis:  K52.9 - Chronic diarrhea [ICD-10-CM]  Z85.038 - History of colon cancer [ICD-10-CM]  Z86.19 - History of Helicobacter pylori infection [ICD-10-CM]      Final Diagnosis   1. Stomach, Antrum, Biopsy:     - Gastric oxyntic-type mucosa with abundant Helicobacter-like organisms and moderate chronic, active inflammation.  See comment.  - Negative for intestinal metaplasia.  - Negative for dysplasia and malignancy.     Comment:  An immunohistochemical stain (IHC) for Helicobacter pylori is performed with appropriate controls reviewed and is POSITIVE.     2. Rectum, Mass, Biopsy:     - Invasive adenocarcinoma, well differentiated. See Comment.     Comment:  The patient's history of colonic primary invasive adenocarcinoma (BRS-) status post resection and chemotherapy is noted.  The present case demonstrates focal invasive carcinoma arising in a background of high-grade dysplasia and necrosis, consistent with recurrence/persistence of the patient's known colonic primary adenocarcinoma. Immunohistochemical stains (IHC) for CK20 and CDX-2 are performed with appropriate  "controls reviewed and are POSITIVE. H&E levels are examined.   Electronically signed by Atilio Vila MD on 4/9/2025 at 2:38 PM    Gross Description  [facility]   1. Stomach, Antrum: gastric bx; r/o h pylori  MRN # on Epic Label:  [id number]  MRN # on Pathology Label:  [id number]    The specimen is received in formalin labeled "gastric bx; R/O H pylori".  The specimen consists of 1 tan fragment of soft tissue measuring 0.5 x 0.2 x 0.2 cm.  The specimen is submitted entirely in cassette 1A.     Grossing was completed by Luan Ayala on 4/6/2025.     2. Large intestine, Colon, Rectum: rectal mass bx  MRN # on Epic Label: [id number]  MRN # on Pathology Label: [id number]    The specimen is received in formalin labeled "rectal mass bx".  The specimen consists of multiple tan fragments of soft tissue measuring 0.9 x 0.2 x 0.1 cm in aggregate.  The specimen is submitted entirely in cassette 2A.     Grossing was completed by Luan Ayala on 4/6/2025.      Report Footnotes  [facility]   Unless the case is a "gross only" or additional testing only, the final diagnosis for each specimen is based on a microscopic examination of appropriate tissue sections.   Performing Location  [facility]   Grossing performed at [facility], [address] [place] [address], [address]     Sign out performed at [facility], [id number] [place] [address] [place], [address]   Dx Category Malignant/neoplasm Abnormal  [facility]             Imaging   MRI Rectal Cancer W W/O Contrast - 04/16/2025  FINDINGS:  There is a large mucin containing T2 hyperintense mass centered in the posterior pelvis with epicenter outside of bowel and demonstrates some mass effect upon collapsed rectosigmoid.  It demonstrates diffuse heterogeneous enhancement.  It is significantly reduced in size measuring roughly 4.8 x 4.2 x 4.2 cm compared to approximately 7.0 x 10.5 x 11.3 cm on the prior examination (remeasured by me today).  There is a small rounded right-sided " mesorectal lymph node which appears increased in size which measures approximately 6 mm (series 2, image 29).  No additional enlarged lymph nodes.  Hysterectomy.  Ovaries appear within normal limits.  No ascites.  Bladder mostly collapsed.  No suspicious osseous lesions.     Impression:  There is a large T2 hyperintense mucinous mass with epicenter extrinsic to the bowel as seen on the comparison examination.  It is reduced in size.  There is a small mildly suspicious right-sided mesorectal subcentimeter lymph node.        CT Chest Abdomen Pelvis With IV Contrast (XPD) Routine Oral Contrast - 05/08/2025  Impression:  Compared to 09/09/2024, suggestion of interval progression of disease with enlargement of the perirectal mass now 4.8 x 3.7 cm, previously 3.6 x 3.3 cm as remeasured on prior exam from 09/09/2024. Additionally, there is suggestion of enlargement of an intraluminal component of the mass, difficult to quantify due to the presence of stool. Otherwise, the rectal anastomosis appears intact without evidence of obstruction. There are multiple nonspecific subcentimeter short axis mesorectal and pelvic lymph nodes, somewhat suspicious.  No evidence of distant spread of metastatic disease in the upper abdomen or chest.    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 Jakub Angulo and Dr. Powers.), with final pathology resultant as  [id number], mucinous adenocarcinoma. Pre treatment CEA 10/04/2023 07.28.  Colorectal TB 04/23/2025:  This represents local persistence/recurrence of disease. Consensus decision to proceed with radiation followed by restaging. She will also benefit from  chemotherapy - details of her prior regimen is being ascertained.   CEA 04/17/2025: 18.7  Plan  Genetic testing pending  - referred to genetic counselor - messaged NN to inquire getting patient scheduled  CT CAP 05/08/25 showed no distant metastatic disease  Labs reviewed and stable  Completed chemo/rads 07/01/2025  Plan for repeat imaging 8-12 weeks post radiation completion       Chemotherapy Induced Fatigue  Currently manageable  Continue to monitor      Chemotherapy Induced Diarrhea, Resolved       Cancer Screening  MMG 10/09/2024: BI-RADS Category 1: Negative   PAP Smear: Hx of Hysterectomy       Chronic Medical Conditions  GERD  DM II, non-insulin dependent   HLD  H. Pylori - ?untreated        Med Onc Chart Routing      Follow up with physician    Follow up with [facility]    Infusion scheduling note    Injection scheduling note    Labs    Imaging CT chest abdomen pelvis and MRI   schedule on a Saturday if possible   Pharmacy appointment    Other referrals                The patient was seen, interviewed and examined. Pertinent lab and radiologic studies were reviewed. Pt instructed to call should they develop concerning signs/symptoms or have further questions.        Portions of the record may have been created with voice recognition software. Occasional wrong-word or sound-a-like substitutions may have occurred due to the inherent limitations of voice recognition software. Read the chart carefully and recognize, using context, where substitutions have occurred.      Tanisha Vines MD    Hematology/Oncology                  [1]   Social History  Tobacco Use    Smoking status: Never    Smokeless tobacco: Never   Substance Use Topics    Alcohol use: Never    Drug use: Never      Patient

## 2025-07-03 NOTE — LETTER
July 3, 2025      [facility]  [address] [address] [facility] [address]  Phone: [phone]  Fax: [phone]       Patient: Kaylan Lal   YOB: 1970  Date of Visit: 07/03/2025    To Whom It May Concern:    Matt Lal  was at [facility] on 07/03/2025. The patient may return to work on 07/072025 with the following restrictions: restroom breaks every 2 hours, and allowed to wear comfortable shoes such as tennis shoes.   If you have any questions or concerns, or if I can be of further assistance, please do not hesitate to contact me.        Sincerely,          Tanisha Vines MD

## 2025-07-04 ENCOUNTER — PATIENT MESSAGE (OUTPATIENT)
Dept: HEMATOLOGY/ONCOLOGY | Facility: CLINIC | Age: 55
End: 2025-07-04
Payer: COMMERCIAL

## 2025-07-04 LAB
AMORPH CRY UR QL COMP ASSIST: ABNORMAL
BACTERIA #/AREA URNS AUTO: ABNORMAL /HPF
CAOX CRY UR QL COMP ASSIST: ABNORMAL
HOLD SPECIMEN: NORMAL
MICROSCOPIC COMMENT: ABNORMAL
WBC #/AREA URNS AUTO: 12 /HPF (ref 0–5)

## 2025-07-04 RX ORDER — NITROFURANTOIN 25; 75 MG/1; MG/1
100 CAPSULE ORAL 2 TIMES DAILY
Qty: 10 CAPSULE | Refills: 0 | Status: SHIPPED | OUTPATIENT
Start: 2025-07-04 | End: 2025-07-09

## 2025-07-05 LAB — BACTERIA UR CULT: NORMAL

## 2025-07-06 ENCOUNTER — TELEPHONE (OUTPATIENT)
Dept: PHARMACY | Facility: CLINIC | Age: 55
End: 2025-07-06
Payer: COMMERCIAL

## 2025-07-06 NOTE — TELEPHONE ENCOUNTER
[facility] Chart Review & Patient Outreach Details For Medication Adherence Project     1.Reason for Outreach Encounter: 3rd Party payor non-compliance report ([employer], [employer], [facility], etc)  2.  Patient Outreach Method: PressMatrixhart message  3.   Medication in question: Januvia 100mg  LAST FILLED: 4/3/25 for 30 day supply    Diabetes Medications              SITagliptin phosphate (JANUVIA) 100 MG Tab Take 1 tablet (100 mg total) by mouth once daily.              4.  Reviewed and or Updates Made To: Patient Chart  5. Outreach Outcomes and/or actions taken: Sent inquiry to patient: Waiting for response.

## 2025-07-07 ENCOUNTER — TELEPHONE (OUTPATIENT)
Dept: HEMATOLOGY/ONCOLOGY | Facility: CLINIC | Age: 55
End: 2025-07-07
Payer: COMMERCIAL

## 2025-07-07 ENCOUNTER — PATIENT MESSAGE (OUTPATIENT)
Dept: HEMATOLOGY/ONCOLOGY | Facility: CLINIC | Age: 55
End: 2025-07-07
Payer: COMMERCIAL

## 2025-07-08 ENCOUNTER — TELEPHONE (OUTPATIENT)
Dept: HEMATOLOGY/ONCOLOGY | Facility: CLINIC | Age: 55
End: 2025-07-08
Payer: COMMERCIAL

## 2025-07-09 ENCOUNTER — TELEPHONE (OUTPATIENT)
Dept: HEMATOLOGY/ONCOLOGY | Facility: CLINIC | Age: 55
End: 2025-07-09
Payer: COMMERCIAL

## 2025-07-10 ENCOUNTER — TELEPHONE (OUTPATIENT)
Dept: HEMATOLOGY/ONCOLOGY | Facility: CLINIC | Age: 55
End: 2025-07-10
Payer: COMMERCIAL

## 2025-07-15 ENCOUNTER — TELEPHONE (OUTPATIENT)
Dept: HEMATOLOGY/ONCOLOGY | Facility: CLINIC | Age: 55
End: 2025-07-15
Payer: COMMERCIAL

## 2025-07-16 ENCOUNTER — OFFICE VISIT (OUTPATIENT)
Dept: GENETICS | Facility: CLINIC | Age: 55
End: 2025-07-16
Payer: COMMERCIAL

## 2025-07-16 DIAGNOSIS — Z80.3 FAMILY HISTORY OF BREAST CANCER: ICD-10-CM

## 2025-07-16 DIAGNOSIS — C18.7 MALIGNANT NEOPLASM OF SIGMOID COLON: ICD-10-CM

## 2025-07-16 DIAGNOSIS — Z71.83 ENCOUNTER FOR NONPROCREATIVE GENETIC COUNSELING: Primary | ICD-10-CM

## 2025-07-16 DIAGNOSIS — Z80.41 FAMILY HISTORY OF OVARIAN CANCER: ICD-10-CM

## 2025-07-16 NOTE — PROGRESS NOTES
"Cancer Genetics  Hereditary/High Risk Clinic  Department of Hematology and Oncology  [facility]        Date of Service:  2025  Provider:  Helen Neff MS, [facility] [place]  Collaborating Physician: Dr. Smith Soler    Patient Information  Name:  Kaylan Lal  :  1970  MRN:  [id number]   The patient location is: [place]       Referring Provider:  Dr. Tanisha Diaz    Indication: Genetic evaluation due to personal history of colon cancer      Relevant Medical History:  Kaylan Lal ("Kaylan"), 55 y.o., assigned female sex at birth, is established with the [facility] Department of Hematology and Oncology but new to me.  She was unaccompanied to the appointment. She was referred by Dr. Tanisha Diaz (Hematology and Oncology) for hereditary cancer risk assessment.    Focused Medical History  Previous germline cancer genetic testing:  Yes - per patient report negative genetic testing ordered by Dr. Dulce Suero at [facility]:  Yes - Colorectal Cancer  Invasive adenocarcinoma  Initial diagnosis in 2023 at 53, recurrent as of 2025  Immunohistochemistry (IHC) Testing for Mismatch Repair (MMR) Proteins: MLH1, MSH2, MSH6, PMS2 - Intact nuclear expression   Colon Cancer Screening: Yes - Colonoscopy  Most recent: 2025  Colon polyp:  Yes - invasive adenocarcinoma  Mammogram: Yes  Most recent mammo: 10/09/2024 - negative  Breast MRI:  No  Other tumor or pertinent mass/lesion:  No  Pancreatitis:  No  Blood cancer, blood pre-cancer, or blood condition: No    Focused Surgical History  Reproductive organs:  s/p hysterectomy    Dermatologic History  No issues reported  Abnormal moles/lesions: none  Skin cancer screening: as needed    Breast Cancer Risk Assessment Questionnaire  Age:  55 y.o.   Race and ethnicity:  Black or , Not  or /a  Weight:  201 lb  Height:  5'9"  Mammographic breast density:  scattered fibroglandular densities   Age at menarche:  " 12y  Age at first live childbirth:  15y  Menopausal status:  postmenopausal, menopause at 53  Hormone replacement therapy use history:  No  Breast biopsy history and findings:  No  Thoracic radiation therapy history:  No    Focused Social History  Not a smoker    ONCOLOGY PEDIGREE  Ashkenazi Judaism:  No  Consanguinity:  No  Hereditary cancer genetic testing in blood relatives:  No    Family Cancer Pedigree:  Cancer Pedigree      Sister (62) diagnosed with breast cancer in her 50s    Maternal:  Aunt (, age unknown) diagnosed with ovarian cancer at 62    Paternal:  No known family history of cancer    A family history of birth defects, intellectual disability, SIDS, sudden early death, multiple miscarriages and consanguinity were denied. Please refer to above pedigree for further details. A larger copy has been scanned in the Media tab.     COUNSELING   Causes of cancer  Most cancers are sporadic. Causes of sporadic cancers may include environmental risk factors, lifestyle risk factors, and non-modifiable risk factors.  It is important to note that members of a family often share not only their genetics but also risk factors including environmental and lifestyle risk factors, so cancers can be familial. Occasionally, cancer risk can cluster in families due to shared genetic and environmental risk factors.     Germline cancer genetic testing is the testing of genes associated with cancer, known as cancer susceptibility genes. About 5-10% of cancers are caused by inheriting a mutation in a cancer susceptibility gene. Just as these genes are inherited from parents, mutations in these genes can be inherited, as well. A mutation in a cancer susceptibility gene adversely affects the gene's ability to prevent cancer; therefore, carriers of cancer susceptibility gene mutations may be at increased risk for certain cancers. Concerns for an inherited susceptibility increase when cancer is diagnosed under the age of 50,  multiple primary tumors or cancers are found, multiple family members have similar or specific kinds of cancers, and unknown or absent risk factors.     Genetic mutation inheritance  If Kaylan tests positive for a mutation, her first-degree relatives would each have a 50% chance of having the same mutation, and other, more distantly related blood-relatives would also be at risk of having the same mutation.       Assessment  Kaylan's reported personal and family history meets the genetic testing criteria established by the National [employer] Genetic/Familial High-Risk Assessment: Breast, Ovarian, Pancreatic, and Prostate version 1.2026.    Plan  Kaylan reports having negative genetic testing ordered by Dr. Suero at [facility]. We discussed that her test result is not available to be seen in her chart so we will need to reach out to Dr. Suero for a copy of her test report.    I will contact Kaylan about I see a copy of her genetic test report to discuss them.      Questions were encouraged and answered to the patient's satisfaction, and she verbalized understanding of information and agreement with the plan.       This assessment is based on the history and reports provided, as well as the current scientific knowledge regarding cancer genetics.     Visit Information  Visit type: audiovisual.  Approximately 30 minutes were spent face-to-face with the patient.  Approximately 85 minutes in total were spent on this encounter, which includes face-to-face time and non-face-to-face time preparing to see the patient (e.g., review of tests), obtaining and/or reviewing separately obtained history, documenting clinical information in the electronic or other health record, independently interpreting results (not separately reported) and communicating results to the patient/family/caregiver, or care coordination (not separately reported).     Helen Duncan, [facility]  Cancer Genetic Counselor   [facility]

## 2025-07-24 ENCOUNTER — TELEPHONE (OUTPATIENT)
Dept: SURGICAL ONCOLOGY | Facility: CLINIC | Age: 55
End: 2025-07-24
Payer: COMMERCIAL

## 2025-07-24 NOTE — TELEPHONE ENCOUNTER
Spoke with patient. Patient agreed to move MRI & CT to earlier in the day to be able to see Dr. Sebastian to follow. We discussed the need to be NPO 4 hours prior. Patient verbalized understanding with no further questions.

## 2025-07-28 ENCOUNTER — TELEPHONE (OUTPATIENT)
Dept: GENETICS | Facility: CLINIC | Age: 55
End: 2025-07-28
Payer: COMMERCIAL

## 2025-07-28 DIAGNOSIS — Z15.89 PALB2 GENE MUTATION POSITIVE: Primary | ICD-10-CM

## 2025-07-28 NOTE — TELEPHONE ENCOUNTER
I called Ms. Lal to let her know I have received a copy of her genetic test report and we need to discuss them. Ms. Lal requested I give her a call on Tuesday 7/29/25 at 1pm to review her results.     A copy of her genetic test results in under the media tab.     Helen Neff, JD [facility]  Cancer Genetic Counselor   [place] [facility]  [phone]

## 2025-08-21 ENCOUNTER — TELEPHONE (OUTPATIENT)
Dept: PHARMACY | Facility: CLINIC | Age: 55
End: 2025-08-21
Payer: COMMERCIAL

## 2025-08-28 ENCOUNTER — HOSPITAL ENCOUNTER (OUTPATIENT)
Dept: RADIOLOGY | Facility: HOSPITAL | Age: 55
Discharge: HOME | End: 2025-08-28
Attending: INTERNAL MEDICINE
Payer: COMMERCIAL

## 2025-08-28 DIAGNOSIS — C18.7 MALIGNANT NEOPLASM OF SIGMOID COLON: ICD-10-CM

## 2025-08-28 PROCEDURE — 71260 CT THORAX DX C+: CPT | Mod: 26,,, | Performed by: STUDENT IN AN ORGANIZED HEALTH CARE EDUCATION/TRAINING PROGRAM

## 2025-08-28 PROCEDURE — 72195 MRI PELVIS W/O DYE: CPT | Mod: 26,,, | Performed by: RADIOLOGY

## 2025-08-28 PROCEDURE — 74177 CT ABD & PELVIS W/CONTRAST: CPT | Mod: TC

## 2025-08-28 PROCEDURE — 25500020 PHARM REV CODE 255: Performed by: INTERNAL MEDICINE

## 2025-08-28 PROCEDURE — 74177 CT ABD & PELVIS W/CONTRAST: CPT | Mod: 26,,, | Performed by: STUDENT IN AN ORGANIZED HEALTH CARE EDUCATION/TRAINING PROGRAM

## 2025-08-28 PROCEDURE — 72195 MRI PELVIS W/O DYE: CPT | Mod: TC

## 2025-08-28 RX ADMIN — IOHEXOL 100 ML: 350 INJECTION, SOLUTION INTRAVENOUS at 02:08

## 2025-08-29 ENCOUNTER — TELEPHONE (OUTPATIENT)
Dept: HEMATOLOGY/ONCOLOGY | Facility: CLINIC | Age: 55
End: 2025-08-29
Payer: COMMERCIAL

## 2025-09-03 ENCOUNTER — OFFICE VISIT (OUTPATIENT)
Dept: HEMATOLOGY/ONCOLOGY | Facility: CLINIC | Age: 55
End: 2025-09-03
Payer: COMMERCIAL

## 2025-09-03 VITALS
WEIGHT: 209.88 LBS | DIASTOLIC BLOOD PRESSURE: 82 MMHG | HEART RATE: 71 BPM | HEIGHT: 69 IN | OXYGEN SATURATION: 99 % | SYSTOLIC BLOOD PRESSURE: 141 MMHG | BODY MASS INDEX: 31.09 KG/M2

## 2025-09-03 DIAGNOSIS — C18.7 MALIGNANT NEOPLASM OF SIGMOID COLON: Primary | ICD-10-CM

## 2025-09-03 PROCEDURE — 99999 PR PBB SHADOW E&M-EST. PATIENT-LVL III: CPT | Mod: PBBFAC,,, | Performed by: INTERNAL MEDICINE
